# Patient Record
Sex: MALE | Race: WHITE | NOT HISPANIC OR LATINO | Employment: UNEMPLOYED | URBAN - NONMETROPOLITAN AREA
[De-identification: names, ages, dates, MRNs, and addresses within clinical notes are randomized per-mention and may not be internally consistent; named-entity substitution may affect disease eponyms.]

---

## 2017-02-23 ENCOUNTER — OFFICE VISIT (OUTPATIENT)
Dept: FAMILY MEDICINE CLINIC | Facility: CLINIC | Age: 42
End: 2017-02-23

## 2017-02-23 VITALS
DIASTOLIC BLOOD PRESSURE: 94 MMHG | BODY MASS INDEX: 38.25 KG/M2 | TEMPERATURE: 99.1 F | SYSTOLIC BLOOD PRESSURE: 141 MMHG | WEIGHT: 267.2 LBS | OXYGEN SATURATION: 99 % | HEIGHT: 70 IN | HEART RATE: 100 BPM

## 2017-02-23 DIAGNOSIS — F31.32 BIPOLAR AFFECTIVE DISORDER, CURRENTLY DEPRESSED, MODERATE (HCC): Primary | ICD-10-CM

## 2017-02-23 DIAGNOSIS — E11.65 TYPE 2 DIABETES MELLITUS WITH HYPERGLYCEMIA, WITHOUT LONG-TERM CURRENT USE OF INSULIN (HCC): ICD-10-CM

## 2017-02-23 DIAGNOSIS — J06.9 VIRAL URI: ICD-10-CM

## 2017-02-23 LAB
EXPIRATION DATE: NORMAL
FLUAV AG NPH QL: NORMAL
FLUBV AG NPH QL: NORMAL
INTERNAL CONTROL: NORMAL
Lab: NORMAL

## 2017-02-23 PROCEDURE — 87804 INFLUENZA ASSAY W/OPTIC: CPT | Performed by: NURSE PRACTITIONER

## 2017-02-23 PROCEDURE — 99214 OFFICE O/P EST MOD 30 MIN: CPT | Performed by: NURSE PRACTITIONER

## 2017-02-26 NOTE — PROGRESS NOTES
Subjective   Thomas Mireles is a 41 y.o. male.     Diabetes   He presents for his follow-up diabetic visit. He has type 2 diabetes mellitus. His disease course has been stable (patient states he hasnt been monitoring his sugar for some time as his depression has totally consumed him to the point he really didnt even care how his numbers were.  Eating his normal diet ). There are no hypoglycemic associated symptoms. There are no diabetic associated symptoms. (Denies any concerns with his diabetes.  Request sample of his medication.  ) There are no hypoglycemic complications. Symptoms are stable. There are no diabetic complications. Risk factors for coronary artery disease include obesity and stress. He is compliant with treatment most of the time. Meal planning includes avoidance of concentrated sweets. He participates in exercise intermittently.   Cough   This is a new problem. The current episode started 1 to 4 weeks ago. The problem has been waxing and waning. The cough is non-productive. Associated symptoms include chills, a fever (4 days ago fever lasted 24 hours.  ) and rhinorrhea. Pertinent negatives include no rash, sore throat, shortness of breath or wheezing. Nothing aggravates the symptoms. He has tried OTC cough suppressant and rest for the symptoms. The treatment provided moderate relief.   Depression   Visit Type: follow-up (States the last several months symptoms worsened.  Looking back feels the entire year of 2015 he was in a manic episode and now has crashed hard.  Missing a lot of work or unable to complete his day.  Request a referral back to psych)  Patient presents with the following symptoms: decreased concentration, depressed mood, excessive worry, fatigue, feelings of hopelessness, feelings of worthlessness, hypersomnia and suicidal ideas (states in the past several weeks he was low enough to consider death.  He lives with his parents and gave them possession of his gun as venkatesh was his means  of death. Feels he has overcome this feeling and knows he cant go on without medications and help . ).  Patient is not experiencing: shortness of breath, suicidal planning and thoughts of death.  Frequency of symptoms: constantly   Severity: interfering with daily activities   Sleep quality: fair  Nighttime awakenings: several       Long history of bipolar on and off medications for many years.  Most recently stop with psych care as he felt his was in control and did well without medications for some time.  His counselor and psychologist both retired within the same year both independent practice and no replacement.  Jas didn't feel he could replace them and today knows he must be on medication but dreads the side effects as sometime they are worse than his mood and behavior.  Jas has been researching herbal non chemical medications and want to discuss this with psych    The following portions of the patient's history were reviewed and updated as appropriate: allergies, current medications, past family history, past medical history, past social history, past surgical history and problem list.      Review of Systems   Constitutional: Positive for chills and fever (4 days ago fever lasted 24 hours.  ).   HENT: Positive for rhinorrhea. Negative for sore throat.    Respiratory: Positive for cough. Negative for shortness of breath and wheezing.    Skin: Negative for rash.   Psychiatric/Behavioral: Positive for agitation, behavioral problems, decreased concentration, sleep disturbance and suicidal ideas (states in the past several weeks he was low enough to consider death.  He lives with his parents and gave them possession of his gun as venkatesh was his means of death. Feels he has overcome this feeling and knows he cant go on without medications and help . ). Negative for self-injury.   All other systems reviewed and are negative.      Procedures    Objective   Physical Exam   Constitutional: He is oriented to person,  place, and time. He appears well-developed and well-nourished. No distress.   HENT:   Head: Normocephalic.   Right Ear: Hearing, tympanic membrane, external ear and ear canal normal.   Left Ear: Hearing, tympanic membrane, external ear and ear canal normal.   Nose: Nose normal.   Mouth/Throat: Uvula is midline, oropharynx is clear and moist and mucous membranes are normal. No oropharyngeal exudate. Tonsils are 0 on the right. Tonsils are 0 on the left.   Eyes: Conjunctivae are normal. Right eye exhibits no discharge. Left eye exhibits no discharge.   Neck: Neck supple. No thyromegaly present.   Cardiovascular: Normal rate, regular rhythm and normal heart sounds.    No murmur heard.  Pulmonary/Chest: Effort normal and breath sounds normal.   Neurological: He is alert and oriented to person, place, and time.   Skin: Skin is warm and dry. He is not diaphoretic.   Psychiatric: He has a normal mood and affect. His speech is normal and behavior is normal. Judgment and thought content normal. Cognition and memory are normal.   Nursing note and vitals reviewed.      Assessment/Plan   Discussed with patient impression and plan, patient verbalizes understanding.  Discussed with patient treatment and declines has any suicidal ideations today and wants to seek care outside of [facility].  States his parents are very supportive and encouraging.  Once an appointment is made states he promises to go.  Does not want in patient treatment.    Thomas was seen today for diabetes, cough, sinusitis and manic behavior.    Diagnoses and all orders for this visit:    Bipolar affective disorder, currently depressed, moderate  -     Ambulatory Referral to Psychiatry    Viral URI  -     POC Influenza A / B    Type 2 diabetes mellitus with hyperglycemia, without long-term current use of insulin  -     Comprehensive Metabolic Panel  -     Hemoglobin A1c  -     Lipid Panel  -     TSH  -     Vitamin B12

## 2017-08-02 ENCOUNTER — OFFICE VISIT (OUTPATIENT)
Dept: FAMILY MEDICINE CLINIC | Facility: CLINIC | Age: 42
End: 2017-08-02

## 2017-08-02 VITALS
BODY MASS INDEX: 35.88 KG/M2 | WEIGHT: 250.6 LBS | HEART RATE: 93 BPM | DIASTOLIC BLOOD PRESSURE: 95 MMHG | HEIGHT: 70 IN | OXYGEN SATURATION: 98 % | SYSTOLIC BLOOD PRESSURE: 138 MMHG

## 2017-08-02 DIAGNOSIS — E11.65 TYPE 2 DIABETES MELLITUS WITH HYPERGLYCEMIA, WITH LONG-TERM CURRENT USE OF INSULIN (HCC): ICD-10-CM

## 2017-08-02 DIAGNOSIS — I10 BENIGN ESSENTIAL HTN: Primary | ICD-10-CM

## 2017-08-02 DIAGNOSIS — Z79.4 TYPE 2 DIABETES MELLITUS WITH HYPERGLYCEMIA, WITH LONG-TERM CURRENT USE OF INSULIN (HCC): ICD-10-CM

## 2017-08-02 DIAGNOSIS — G44.52 NEW PERSISTENT DAILY HEADACHE: ICD-10-CM

## 2017-08-02 PROBLEM — F31.62 BIPOLAR 1 DISORDER, MIXED, MODERATE (HCC): Status: ACTIVE | Noted: 2017-08-02

## 2017-08-02 PROCEDURE — 99214 OFFICE O/P EST MOD 30 MIN: CPT | Performed by: NURSE PRACTITIONER

## 2017-08-02 RX ORDER — CYCLOBENZAPRINE HCL 5 MG
5 TABLET ORAL NIGHTLY PRN
Qty: 30 TABLET | Refills: 1 | Status: SHIPPED | OUTPATIENT
Start: 2017-08-02 | End: 2017-09-12 | Stop reason: SDUPTHER

## 2017-08-02 RX ORDER — LISINOPRIL 5 MG/1
5 TABLET ORAL DAILY
Qty: 30 TABLET | Refills: 5 | Status: SHIPPED | OUTPATIENT
Start: 2017-08-02 | End: 2017-09-12 | Stop reason: SDUPTHER

## 2017-08-02 RX ORDER — TOPIRAMATE 100 MG/1
100 TABLET, FILM COATED ORAL 2 TIMES DAILY
Refills: 0 | COMMUNITY
Start: 2017-05-19 | End: 2017-09-12 | Stop reason: SDUPTHER

## 2017-08-02 RX ORDER — NAPROXEN 500 MG/1
500 TABLET ORAL 2 TIMES DAILY WITH MEALS
Qty: 60 TABLET | Refills: 1 | Status: SHIPPED | OUTPATIENT
Start: 2017-08-02 | End: 2017-09-12 | Stop reason: SDUPTHER

## 2017-08-02 RX ORDER — PROMETHAZINE HYDROCHLORIDE 25 MG/1
25 TABLET ORAL EVERY 6 HOURS PRN
Qty: 30 TABLET | Refills: 1 | Status: SHIPPED | OUTPATIENT
Start: 2017-08-02 | End: 2017-11-01

## 2017-08-03 NOTE — PROGRESS NOTES
"Subjective   Thomas Mireles is a 41 y.o. male.   Chief Compliant: The patient presents with Headache (persistently more severe and longer lasting; predominantly in \"c[place]\" area) and Med Refill (Topamax)    HPI Comments: Returns to day with new daily headache following an injury nearly 1 year ago.   Since here last seen the psychiatry referred to and states he still battles with his bipolar and depression but overall feels he is improved with the medication and tolerating the best he can. As for his sugar he admits he hasn't been as compliant as he should be with his mood and depression he stopped medication for a while and once he returned to the pharmacy he wasn't able to afford the 400 co pay.  He didn't notify the office of any need and has been using his medication sporadically.  Really not interested in discussing his sugar today.     Headache    This is a new problem. The current episode started more than 1 year ago. The problem occurs daily. The problem has been unchanged. The pain is located in the parietal region. The pain does not radiate. The pain quality is similar to prior headaches. The quality of the pain is described as aching, dull and throbbing (at times the headache comes around the temporal and in to his left eye ). The pain is at a severity of 4/10. The pain is mild. Associated symptoms include eye pain, nausea, photophobia, scalp tenderness and vomiting. Pertinent negatives include no abdominal pain, abnormal behavior, anorexia, back pain, blurred vision, coughing, dizziness, drainage, ear pain, eye redness, eye watering, facial sweating, fever, hearing loss, insomnia, loss of balance, muscle aches, neck pain, numbness, rhinorrhea, seizures, sinus pressure, sore throat, swollen glands, tingling, tinnitus, visual change (recent eye exam without any changes), weakness or weight loss. Nothing aggravates the symptoms. He has tried acetaminophen, Excedrin and NSAIDs for the symptoms. The treatment " "provided mild relief.   Hypertension   This is a new problem. The current episode started more than 1 year ago. The problem is unchanged. Associated symptoms include anxiety and headaches. Pertinent negatives include no blurred vision, chest pain, malaise/fatigue, neck pain, orthopnea, palpitations, peripheral edema, PND, shortness of breath or sweats. Risk factors for coronary artery disease include family history, obesity and smoking/tobacco exposure. Past treatments include nothing. Compliance problems include exercise, psychosocial issues, medication side effects, medication cost and diet.       The following portions of the patient's history were reviewed and updated as appropriate: allergies, current medications, past family history, past medical history, past social history, past surgical history and problem list.      Review of Systems   Constitutional: Negative.  Negative for activity change, fever, malaise/fatigue and weight loss.   HENT: Negative for ear pain, hearing loss, rhinorrhea, sinus pressure, sore throat and tinnitus.    Eyes: Positive for photophobia and pain. Negative for blurred vision and redness.   Respiratory: Negative for cough and shortness of breath.    Cardiovascular: Negative for chest pain, palpitations, orthopnea and PND.   Gastrointestinal: Positive for nausea and vomiting. Negative for abdominal pain and anorexia.   Musculoskeletal: Negative for back pain and neck pain.   Neurological: Positive for headaches. Negative for dizziness, tingling, tremors, seizures, syncope, facial asymmetry, speech difficulty, weakness, light-headedness, numbness and loss of balance.   Psychiatric/Behavioral: The patient does not have insomnia.    All other systems reviewed and are negative.      Procedures    Vitals: Blood pressure 138/95, pulse 93, height 70\" (177.8 cm), weight 250 lb 9.6 oz (114 kg), SpO2 98 %.     Allergies: No Known Allergies       Objective   Physical Exam   Constitutional: He is " oriented to person, place, and time. He appears well-developed and well-nourished. No distress.   HENT:   Head: Normocephalic.   Right Ear: Hearing, tympanic membrane, external ear and ear canal normal.   Left Ear: Hearing, tympanic membrane, external ear and ear canal normal.   Nose: Nose normal. No mucosal edema or rhinorrhea. Right sinus exhibits no maxillary sinus tenderness and no frontal sinus tenderness. Left sinus exhibits no maxillary sinus tenderness and no frontal sinus tenderness.   Mouth/Throat: Uvula is midline, oropharynx is clear and moist and mucous membranes are normal. No oropharyngeal exudate. Tonsils are 0 on the right. Tonsils are 0 on the left. No tonsillar exudate.   Scalp tenderness with light palpitations     Eyes: Conjunctivae are normal. Pupils are equal, round, and reactive to light. Right eye exhibits no discharge. Left eye exhibits no discharge.   Neck: Neck supple. No thyromegaly present.   Cardiovascular: Normal rate, regular rhythm and normal heart sounds.    No murmur heard.  Pulmonary/Chest: Effort normal and breath sounds normal.   Lymphadenopathy:     He has no cervical adenopathy.   Neurological: He is alert and oriented to person, place, and time. He has normal reflexes. He displays normal reflexes. No cranial nerve deficit. Coordination normal.   Skin: Skin is warm and dry. He is not diaphoretic.   Psychiatric: He has a normal mood and affect. His behavior is normal. Judgment and thought content normal.   Nursing note and vitals reviewed.      Assessment/Plan   Discussed with patient impression and plan, patient verbalizes understanding  Thomas was seen today for headache and med refill.    Diagnoses and all orders for this visit:    Benign essential HTN    New persistent daily headache  -     Ambulatory Referral to Chiropractic    Type 2 diabetes mellitus with hyperglycemia, with long-term current use of insulin    Other orders  -     lisinopril (PRINIVIL,ZESTRIL) 5 MG  tablet; Take 1 tablet by mouth Daily.  -     naproxen (NAPROSYN) 500 MG tablet; Take 1 tablet by mouth 2 (Two) Times a Day With Meals.  -     promethazine (PHENERGAN) 25 MG tablet; Take 1 tablet by mouth Every 6 (Six) Hours As Needed for Nausea or Vomiting.  -     cyclobenzaprine (FLEXERIL) 5 MG tablet; Take 1 tablet by mouth At Night As Needed for Muscle Spasms.

## 2017-09-01 ENCOUNTER — TELEPHONE (OUTPATIENT)
Dept: FAMILY MEDICINE CLINIC | Facility: CLINIC | Age: 42
End: 2017-09-01

## 2017-09-01 NOTE — TELEPHONE ENCOUNTER
Uzma Flores Chiropractor called regarding patient,reports his headaches have improved but are not going away completely,he told her you were going to refer him to Neuro/Psych? She is just questioning if this is the plan going forward.

## 2017-09-05 NOTE — TELEPHONE ENCOUNTER
We hadnt discussed this and he does see psych  So new plans of a new referral       Uzma notified.

## 2017-09-07 DIAGNOSIS — F31.60 BIPOLAR AFFECTIVE DISORDER, CURRENT EPISODE MIXED, CURRENT EPISODE SEVERITY UNSPECIFIED (HCC): Primary | ICD-10-CM

## 2017-09-12 ENCOUNTER — OFFICE VISIT (OUTPATIENT)
Dept: FAMILY MEDICINE CLINIC | Facility: CLINIC | Age: 42
End: 2017-09-12

## 2017-09-12 VITALS
HEIGHT: 70 IN | BODY MASS INDEX: 35.96 KG/M2 | DIASTOLIC BLOOD PRESSURE: 88 MMHG | SYSTOLIC BLOOD PRESSURE: 141 MMHG | WEIGHT: 251.2 LBS | OXYGEN SATURATION: 98 % | HEART RATE: 97 BPM

## 2017-09-12 DIAGNOSIS — Z79.4 TYPE 2 DIABETES MELLITUS WITH HYPERGLYCEMIA, WITH LONG-TERM CURRENT USE OF INSULIN (HCC): ICD-10-CM

## 2017-09-12 DIAGNOSIS — E78.2 MIXED HYPERLIPIDEMIA: ICD-10-CM

## 2017-09-12 DIAGNOSIS — G44.52 NEW DAILY PERSISTENT HEADACHE: Primary | ICD-10-CM

## 2017-09-12 DIAGNOSIS — F31.62 BIPOLAR 1 DISORDER, MIXED, MODERATE (HCC): ICD-10-CM

## 2017-09-12 DIAGNOSIS — I10 ESSENTIAL HYPERTENSION: ICD-10-CM

## 2017-09-12 DIAGNOSIS — E11.65 TYPE 2 DIABETES MELLITUS WITH HYPERGLYCEMIA, WITH LONG-TERM CURRENT USE OF INSULIN (HCC): ICD-10-CM

## 2017-09-12 LAB
ALBUMIN SERPL-MCNC: 4.6 G/DL (ref 3.5–5)
ALBUMIN/GLOB SERPL: 1.6 G/DL (ref 1.5–2.5)
ALP SERPL-CCNC: 146 U/L (ref 40–129)
ALT SERPL W P-5'-P-CCNC: 31 U/L (ref 10–44)
ANION GAP SERPL CALCULATED.3IONS-SCNC: 7.9 MMOL/L (ref 3.6–11.2)
AST SERPL-CCNC: 25 U/L (ref 10–34)
BILIRUB SERPL-MCNC: 1.1 MG/DL (ref 0.2–1.8)
BUN BLD-MCNC: 19 MG/DL (ref 7–21)
BUN/CREAT SERPL: 24.7 (ref 7–25)
CALCIUM SPEC-SCNC: 9.8 MG/DL (ref 7.7–10)
CHLORIDE SERPL-SCNC: 109 MMOL/L (ref 99–112)
CHOLEST SERPL-MCNC: 252 MG/DL (ref 0–200)
CO2 SERPL-SCNC: 25.1 MMOL/L (ref 24.3–31.9)
CREAT BLD-MCNC: 0.77 MG/DL (ref 0.43–1.29)
GFR SERPL CREATININE-BSD FRML MDRD: 111 ML/MIN/1.73
GLOBULIN UR ELPH-MCNC: 2.9 GM/DL
GLUCOSE BLD-MCNC: 303 MG/DL (ref 70–110)
HBA1C MFR BLD: 11.6 % (ref 4.5–5.7)
HDLC SERPL-MCNC: 39 MG/DL (ref 60–100)
LDLC SERPL CALC-MCNC: 135 MG/DL (ref 0–100)
LDLC/HDLC SERPL: 3.47 {RATIO}
OSMOLALITY SERPL CALC.SUM OF ELEC: 296.7 MOSM/KG (ref 273–305)
POTASSIUM BLD-SCNC: 4.3 MMOL/L (ref 3.5–5.3)
PROT SERPL-MCNC: 7.5 G/DL (ref 6–8)
SODIUM BLD-SCNC: 142 MMOL/L (ref 135–153)
TRIGL SERPL-MCNC: 389 MG/DL (ref 0–150)
TSH SERPL DL<=0.05 MIU/L-ACNC: 0.79 MIU/ML (ref 0.55–4.78)
VLDLC SERPL-MCNC: 77.8 MG/DL

## 2017-09-12 PROCEDURE — 84443 ASSAY THYROID STIM HORMONE: CPT | Performed by: NURSE PRACTITIONER

## 2017-09-12 PROCEDURE — 80061 LIPID PANEL: CPT | Performed by: NURSE PRACTITIONER

## 2017-09-12 PROCEDURE — 80053 COMPREHEN METABOLIC PANEL: CPT | Performed by: NURSE PRACTITIONER

## 2017-09-12 PROCEDURE — 83036 HEMOGLOBIN GLYCOSYLATED A1C: CPT | Performed by: NURSE PRACTITIONER

## 2017-09-12 PROCEDURE — 99214 OFFICE O/P EST MOD 30 MIN: CPT | Performed by: NURSE PRACTITIONER

## 2017-09-12 PROCEDURE — 36415 COLL VENOUS BLD VENIPUNCTURE: CPT | Performed by: NURSE PRACTITIONER

## 2017-09-12 RX ORDER — LISINOPRIL 5 MG/1
5 TABLET ORAL DAILY
Qty: 90 TABLET | Refills: 3 | Status: SHIPPED | OUTPATIENT
Start: 2017-09-12 | End: 2018-06-26 | Stop reason: SDUPTHER

## 2017-09-12 RX ORDER — NAPROXEN 500 MG/1
500 TABLET ORAL 2 TIMES DAILY WITH MEALS
Qty: 180 TABLET | Refills: 3 | Status: SHIPPED | OUTPATIENT
Start: 2017-09-12 | End: 2018-06-26 | Stop reason: SDUPTHER

## 2017-09-12 RX ORDER — CYCLOBENZAPRINE HCL 5 MG
5 TABLET ORAL NIGHTLY PRN
Qty: 90 TABLET | Refills: 3 | Status: SHIPPED | OUTPATIENT
Start: 2017-09-12 | End: 2018-06-26 | Stop reason: SDUPTHER

## 2017-09-12 RX ORDER — DEXTROMETHORPHAN HYDROBROMIDE AND QUINIDINE SULFATE 20; 10 MG/1; MG/1
1 CAPSULE, GELATIN COATED ORAL 2 TIMES DAILY
Refills: 0 | COMMUNITY
Start: 2017-08-16 | End: 2018-03-23 | Stop reason: HOSPADM

## 2017-09-12 RX ORDER — TOPIRAMATE 100 MG/1
100 TABLET, FILM COATED ORAL 2 TIMES DAILY
Qty: 180 TABLET | Refills: 3 | Status: SHIPPED | OUTPATIENT
Start: 2017-09-12 | End: 2018-06-26 | Stop reason: SDUPTHER

## 2017-09-12 NOTE — PROGRESS NOTES
Subjective   Thomas Mireles is a 41 y.o. male.   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  Since now has a daily headache can pinpoint the direct point of pain daily.  Seen at [facility] [employer] and didnt feel he got the correct attention.  ) problem. The current episode started more than 1 year ago. The problem occurs daily. The problem has been unchanged. The pain is located in the parietal region. The pain does not radiate. The pain quality is similar to prior headaches. The quality of the pain is described as aching, dull and throbbing. The pain is at a severity of 4/10. The pain is moderate. Associated symptoms include scalp tenderness. Pertinent negatives include no abdominal pain, abnormal behavior, anorexia, back pain, blurred vision, coughing, dizziness, drainage, ear pain, eye pain, eye redness, eye watering, facial sweating, fever, hearing loss, insomnia, loss of balance, muscle aches, nausea, neck pain, numbness, photophobia, rhinorrhea, seizures, sinus pressure, sore throat, swollen glands, tingling, tinnitus, visual change (recent eye exam without any changes), vomiting, weakness or weight loss. Nothing aggravates the symptoms. He has tried acetaminophen, Excedrin and NSAIDs (Seen chiropractor no imprpvments) for the symptoms. The treatment provided mild relief. His past medical history is significant for hypertension.   Hypertension   This is a new problem. The current episode started more than 1 year ago. The problem has been waxing and waning (Denies any new concerns or symptoms.  Admits he isnt taking his medication routinely ) since onset. Associated symptoms include headaches. Pertinent negatives include no blurred vision, chest pain, malaise/fatigue, neck pain, orthopnea, palpitations, peripheral edema, PND, shortness  of breath or sweats. Risk factors for coronary artery disease include family history, obesity and smoking/tobacco exposure. Past treatments include nothing. Compliance problems include exercise, psychosocial issues, medication side effects, medication cost and diet.    Diabetes   He presents for his follow-up diabetic visit. He has type 2 diabetes mellitus. His disease course has been stable (States he feels ok admits he isnt as compliant as he would actually like to be.  States the daily headache distracts him and leads a downward decline of his health habits, ). Hypoglycemia symptoms include headaches. Pertinent negatives for hypoglycemia include no confusion, dizziness, seizures, sleepiness, speech difficulty, sweats or tremors. Mood changes: with known bipolar. There are no diabetic associated symptoms. Pertinent negatives for diabetes include no blurred vision, no chest pain, no visual change (recent eye exam without any changes), no weakness and no weight loss. There are no hypoglycemic complications. Symptoms are stable. There are no diabetic complications. Risk factors for coronary artery disease include dyslipidemia, family history, obesity, male sex, hypertension, sedentary lifestyle and stress. Current diabetic treatment includes insulin injections and oral agent (dual therapy). He is compliant with treatment some of the time (more consistent latelyt ). His weight is stable. He is following a generally healthy diet. Meal planning includes avoidance of concentrated sweets. He never participates in exercise. Home blood sugar record trend: honest to report he is not monitoring routinely. An ACE inhibitor/angiotensin II receptor blocker is being taken. He does not see a podiatrist.Eye exam is current.   Depression   Visit Type: follow-up (feels his mood is ok for the most part really aggravated with his injury and daily aggravating headache which further affects his mood)  Patient presents with the following  "symptoms: decreased concentration, depressed mood, malaise and muscle tension.  Patient is not experiencing: confusion, insomnia, palpitations, shortness of breath, suicidal ideas, suicidal planning, thoughts of death and weight loss.  Frequency of symptoms: constantly   Severity: interfering with daily activities   Sleep quality: fair  Side effects:  Headaches     The following portions of the patient's history were reviewed and updated as appropriate: allergies, current medications, past family history, past medical history, past social history, past surgical history and problem list.      Review of Systems   Constitutional: Negative.  Negative for activity change, fever, malaise/fatigue and weight loss.   HENT: Negative for ear pain, hearing loss, rhinorrhea, sinus pressure, sore throat and tinnitus.    Eyes: Negative for blurred vision, photophobia, pain and redness.   Respiratory: Negative.  Negative for cough and shortness of breath.    Cardiovascular: Negative.  Negative for chest pain, palpitations, orthopnea and PND.   Gastrointestinal: Negative for abdominal pain, anorexia, nausea and vomiting.   Musculoskeletal: Negative for back pain and neck pain.   Neurological: Positive for headaches. Negative for dizziness, tingling, tremors, seizures, syncope, facial asymmetry, speech difficulty, weakness, light-headedness, numbness and loss of balance.   Psychiatric/Behavioral: Positive for agitation, behavioral problems and decreased concentration. Negative for confusion and suicidal ideas. The patient does not have insomnia.    All other systems reviewed and are negative.      Procedures    Vitals: Blood pressure 141/88, pulse 97, height 70\" (177.8 cm), weight 251 lb 3.2 oz (114 kg), SpO2 98 %.     Allergies: No Known Allergies       Objective   Physical Exam   Constitutional: He is oriented to person, place, and time. He appears well-developed and well-nourished. No distress.   HENT:   Head: Normocephalic. "   Right Ear: Hearing, tympanic membrane, external ear and ear canal normal.   Left Ear: Hearing, tympanic membrane, external ear and ear canal normal.   Nose: Nose normal. No mucosal edema or rhinorrhea. Right sinus exhibits no maxillary sinus tenderness and no frontal sinus tenderness. Left sinus exhibits no maxillary sinus tenderness and no frontal sinus tenderness.   Mouth/Throat: Uvula is midline, oropharynx is clear and moist and mucous membranes are normal. No oropharyngeal exudate. Tonsils are 0 on the right. Tonsils are 0 on the left. No tonsillar exudate.   Scalp tenderness with light palpitations     Eyes: Conjunctivae are normal. Pupils are equal, round, and reactive to light. Right eye exhibits no discharge. Left eye exhibits no discharge.   Neck: Neck supple. No thyromegaly present.   Cardiovascular: Normal rate, regular rhythm and normal heart sounds.    No murmur heard.  Pulmonary/Chest: Effort normal and breath sounds normal.   Lymphadenopathy:     He has no cervical adenopathy.   Neurological: He is alert and oriented to person, place, and time. He has normal reflexes. He displays normal reflexes. No cranial nerve deficit. Coordination normal.   Skin: Skin is warm and dry. He is not diaphoretic.   Psychiatric: He has a normal mood and affect. His behavior is normal. Judgment and thought content normal.   Nursing note and vitals reviewed.      Assessment/Plan   Discussed with patient impression and plan, patient verbalizes understanding.  Fasting labs today.  Encouraged compliancy.  Will follow after CT   Thomas was seen today for follow-up and headache.    Diagnoses and all orders for this visit:    New daily persistent headache  -     CT cervical spine wo contrast; Future    Essential hypertension  -     Comprehensive Metabolic Panel  -     Lipid Panel  -     TSH    Mixed hyperlipidemia  -     Comprehensive Metabolic Panel  -     Lipid Panel    Type 2 diabetes mellitus with hyperglycemia, with  long-term current use of insulin  -     Hemoglobin A1c    Bipolar 1 disorder, mixed, moderate    Other orders  -     cyclobenzaprine (FLEXERIL) 5 MG tablet; Take 1 tablet by mouth At Night As Needed for Muscle Spasms.  -     insulin degludec (TRESIBA FLEXTOUCH) 100 UNIT/ML solution pen-injector injection; Inject 40 Units under the skin Daily.  -     lisinopril (PRINIVIL,ZESTRIL) 5 MG tablet; Take 1 tablet by mouth Daily.  -     naproxen (NAPROSYN) 500 MG tablet; Take 1 tablet by mouth 2 (Two) Times a Day With Meals.  -     topiramate (TOPAMAX) 100 MG tablet; Take 1 tablet by mouth 2 (Two) Times a Day.  -     Insulin Pen Needle (NOVOFINE PLUS) 32G X 4 MM misc; Use once daily

## 2017-09-15 ENCOUNTER — HOSPITAL ENCOUNTER (OUTPATIENT)
Dept: CT IMAGING | Facility: HOSPITAL | Age: 42
Discharge: HOME | End: 2017-09-15
Admitting: NURSE PRACTITIONER

## 2017-09-15 DIAGNOSIS — G44.52 NEW DAILY PERSISTENT HEADACHE: ICD-10-CM

## 2017-09-15 PROCEDURE — 72125 CT NECK SPINE W/O DYE: CPT | Performed by: RADIOLOGY

## 2017-09-15 PROCEDURE — 72125 CT NECK SPINE W/O DYE: CPT

## 2017-09-21 ENCOUNTER — CLINICAL SUPPORT (OUTPATIENT)
Dept: FAMILY MEDICINE CLINIC | Facility: CLINIC | Age: 42
End: 2017-09-21

## 2017-09-21 DIAGNOSIS — Z23 IMMUNIZATION DUE: Primary | ICD-10-CM

## 2017-09-21 PROCEDURE — 90686 IIV4 VACC NO PRSV 0.5 ML IM: CPT | Performed by: NURSE PRACTITIONER

## 2017-09-21 PROCEDURE — 90471 IMMUNIZATION ADMIN: CPT | Performed by: NURSE PRACTITIONER

## 2017-10-04 ENCOUNTER — OFFICE VISIT (OUTPATIENT)
Dept: FAMILY MEDICINE CLINIC | Facility: CLINIC | Age: 42
End: 2017-10-04

## 2017-10-04 VITALS
DIASTOLIC BLOOD PRESSURE: 85 MMHG | HEIGHT: 70 IN | HEART RATE: 110 BPM | SYSTOLIC BLOOD PRESSURE: 143 MMHG | WEIGHT: 248.4 LBS | OXYGEN SATURATION: 98 % | BODY MASS INDEX: 35.56 KG/M2

## 2017-10-04 DIAGNOSIS — F31.62 BIPOLAR 1 DISORDER, MIXED, MODERATE (HCC): ICD-10-CM

## 2017-10-04 DIAGNOSIS — I10 ESSENTIAL HYPERTENSION: ICD-10-CM

## 2017-10-04 DIAGNOSIS — Z79.4 TYPE 2 DIABETES MELLITUS WITH HYPERGLYCEMIA, WITH LONG-TERM CURRENT USE OF INSULIN (HCC): ICD-10-CM

## 2017-10-04 DIAGNOSIS — L71.9 ROSACEA: ICD-10-CM

## 2017-10-04 DIAGNOSIS — R51.9 CHRONIC DAILY HEADACHE: Primary | ICD-10-CM

## 2017-10-04 DIAGNOSIS — E11.65 TYPE 2 DIABETES MELLITUS WITH HYPERGLYCEMIA, WITH LONG-TERM CURRENT USE OF INSULIN (HCC): ICD-10-CM

## 2017-10-04 PROCEDURE — 99214 OFFICE O/P EST MOD 30 MIN: CPT | Performed by: NURSE PRACTITIONER

## 2017-10-04 RX ORDER — BUTALBITAL, ASPIRIN, AND CAFFEINE 50; 325; 40 MG/1; MG/1; MG/1
1 CAPSULE ORAL 2 TIMES DAILY PRN
Qty: 40 CAPSULE | Refills: 0 | Status: SHIPPED | OUTPATIENT
Start: 2017-10-04 | End: 2022-02-22

## 2017-10-04 RX ORDER — METRONIDAZOLE 7.5 MG/G
GEL TOPICAL 2 TIMES DAILY
Qty: 45 G | Refills: 5 | Status: ON HOLD | OUTPATIENT
Start: 2017-10-04 | End: 2017-11-01

## 2017-10-04 NOTE — PROGRESS NOTES
Subjective   Thomas Mireles is a 41 y.o. male.   Chief Compliant: The patient presents with Headache    HPI Comments: Returns today with request for new referral as the Physican he was scheduled with is no longer at the practice.  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  Since now has a daily headache can pinpoint the direct point of pain daily.  Seen at [facility] and didnt feel he got the correct attention.  ) problem. The current episode started more than 1 year ago. The problem occurs daily. The problem has been unchanged. The pain is located in the parietal region. The pain does not radiate. The pain quality is similar to prior headaches. The quality of the pain is described as aching, dull and throbbing. The pain is at a severity of 4/10. The pain is moderate. Associated symptoms include scalp tenderness. Pertinent negatives include no abdominal pain, abnormal behavior, anorexia, back pain, blurred vision, coughing, dizziness, drainage, ear pain, eye pain, eye redness, eye watering, facial sweating, fever, hearing loss, insomnia, loss of balance, muscle aches, nausea, neck pain, numbness, photophobia, rhinorrhea, seizures, sinus pressure, sore throat, swollen glands, tingling, tinnitus, visual change (recent eye exam without any changes), vomiting, weakness or weight loss. Associated symptoms comments: Pain is described as sharp quick buzz or sting in the back of his head.  Patient can pinpoint the direct area.  Pain is aggravating every aspect of his life and further worsening his anxiety and depression   . Nothing aggravates the symptoms. He has tried acetaminophen, Excedrin and NSAIDs (Seen chiropractor no imprpvments) for the symptoms. The treatment provided mild relief. His past medical history is significant for hypertension.   Hypertension   This is a new problem. The current  episode started more than 1 year ago. The problem has been waxing and waning (Denies any new concerns or symptoms.  States since he was here taking his medicaiton regular.  Pulse is high today as he is aggravated by a work related call just before the visit) since onset. Associated symptoms include headaches. Pertinent negatives include no blurred vision, chest pain, malaise/fatigue, neck pain, orthopnea, palpitations, peripheral edema, PND, shortness of breath or sweats. Risk factors for coronary artery disease include family history, obesity and smoking/tobacco exposure. Past treatments include ACE inhibitors. The current treatment provides moderate improvement. Compliance problems include exercise, psychosocial issues, medication side effects, medication cost and diet.    Diabetes   He presents for his follow-up diabetic visit. He has type 2 diabetes mellitus. His disease course has been stable (States he feels ok admits he isnt as compliant as he would actually like to be.  States the daily headache distracts him and leads a downward decline of his health habits, ). Hypoglycemia symptoms include headaches and nervousness/anxiousness. Pertinent negatives for hypoglycemia include no confusion, dizziness, hunger, pallor, seizures, sleepiness, speech difficulty, sweats or tremors. Mood changes: with known bipolar. There are no diabetic associated symptoms. Pertinent negatives for diabetes include no blurred vision, no chest pain, no fatigue, no foot paresthesias, no foot ulcerations, no polydipsia, no polyphagia, no polyuria, no visual change (recent eye exam without any changes), no weakness and no weight loss. There are no hypoglycemic complications. Symptoms are stable. There are no diabetic complications. Risk factors for coronary artery disease include dyslipidemia, family history, obesity, male sex, hypertension, sedentary lifestyle and stress. Current diabetic treatment includes insulin injections and oral  agent (dual therapy). He is compliant with treatment some of the time (states he is taking his medications everyday ). His weight is stable. He is following a generally healthy diet. Meal planning includes avoidance of concentrated sweets. He never participates in exercise. Home blood sugar record trend: honest to report he is not monitoring routinely. An ACE inhibitor/angiotensin II receptor blocker is being taken. He does not see a podiatrist.Eye exam is current.   Depression   Visit Type: follow-up (feels his mood is ok for the most part really aggravated with his injury and daily aggravating headache which further affects his mood)  Patient presents with the following symptoms: decreased concentration, depressed mood, dizziness, excessive worry, malaise, muscle tension and nervousness/anxiety.  Patient is not experiencing: confusion, insomnia, palpitations, shortness of breath, suicidal ideas, suicidal planning, thoughts of death and weight loss.  Frequency of symptoms: constantly   Severity: interfering with daily activities   Sleep quality: fair  Side effects:  Headaches  Rash   This is a recurrent problem. The current episode started more than 1 year ago. The problem has been waxing and waning (worse with shaving ) since onset. The affected locations include the face. The rash is characterized by dryness, redness and burning. He was exposed to nothing. Pertinent negatives include no anorexia, cough, eye pain, fatigue, fever, rhinorrhea, shortness of breath, sore throat or vomiting. Past treatments include nothing (moistruizer). The treatment provided mild relief.      The following portions of the patient's history were reviewed and updated as appropriate: allergies, current medications, past family history, past medical history, past social history, past surgical history and problem list.      Review of Systems   Constitutional: Negative.  Negative for activity change, fatigue, fever, malaise/fatigue and  "weight loss.   HENT: Negative for ear pain, hearing loss, rhinorrhea, sinus pressure, sore throat and tinnitus.    Eyes: Negative for blurred vision, photophobia, pain and redness.   Respiratory: Negative.  Negative for cough and shortness of breath.    Cardiovascular: Negative.  Negative for chest pain, palpitations, orthopnea and PND.   Gastrointestinal: Negative for abdominal pain, anorexia, nausea and vomiting.   Endocrine: Negative for polydipsia, polyphagia and polyuria.   Genitourinary: Negative.    Musculoskeletal: Negative for back pain and neck pain.   Skin: Positive for rash. Negative for pallor.   Neurological: Positive for headaches. Negative for dizziness, tingling, tremors, seizures, syncope, facial asymmetry, speech difficulty, weakness, light-headedness, numbness and loss of balance.   Psychiatric/Behavioral: Positive for agitation, behavioral problems and decreased concentration. Negative for confusion and suicidal ideas. The patient is nervous/anxious. The patient does not have insomnia.    All other systems reviewed and are negative.      Procedures    Vitals: Blood pressure 143/85, pulse 110, height 70\" (177.8 cm), weight 248 lb 6.4 oz (113 kg), SpO2 98 %.     Allergies: No Known Allergies       Objective   Physical Exam   Constitutional: He is oriented to person, place, and time. He appears well-developed and well-nourished. No distress.   HENT:   Head: Normocephalic.   Right Ear: Hearing, tympanic membrane, external ear and ear canal normal.   Left Ear: Hearing, tympanic membrane, external ear and ear canal normal.   Nose: Nose normal. No mucosal edema or rhinorrhea. Right sinus exhibits no maxillary sinus tenderness and no frontal sinus tenderness. Left sinus exhibits no maxillary sinus tenderness and no frontal sinus tenderness.   Mouth/Throat: Uvula is midline, oropharynx is clear and moist and mucous membranes are normal. No oropharyngeal exudate. Tonsils are 0 on the right. Tonsils are 0 " on the left. No tonsillar exudate.   Scalp tenderness with light palpitations     Eyes: Conjunctivae are normal. Pupils are equal, round, and reactive to light. Right eye exhibits no discharge. Left eye exhibits no discharge.   Neck: Neck supple. No thyromegaly present.   Cardiovascular: Normal rate, regular rhythm and normal heart sounds.    No murmur heard.  Pulmonary/Chest: Effort normal and breath sounds normal.   Lymphadenopathy:     He has no cervical adenopathy.   Neurological: He is alert and oriented to person, place, and time. He has normal reflexes. He displays normal reflexes. No cranial nerve deficit. Coordination normal.   Skin: Skin is warm and dry. Rash noted. He is not diaphoretic. There is erythema.   Around nose, hair line and mouth     Psychiatric: He has a normal mood and affect. His behavior is normal. Judgment and thought content normal.   Nursing note and vitals reviewed.      Assessment/Plan   Discussed with patient impression and plan, patient verbalizes understanding.    Encouraged continued compliancy.  Ct reviewed with patient      Thomas was seen today for headache.    Diagnoses and all orders for this visit:    Chronic daily headache  -     Ambulatory Referral to Neurology    Rosacea    Bipolar 1 disorder, mixed, moderate    Essential hypertension    Type 2 diabetes mellitus with hyperglycemia, with long-term current use of insulin    Other orders  -     metroNIDAZOLE (METROGEL) 0.75 % gel; Apply  topically 2 (Two) Times a Day.  -     butalbital-aspirin-caffeine (FIORINAL) -40 MG per capsule; Take 1 capsule by mouth 2 (Two) Times a Day As Needed for Headache.      [facility] # [id number] Reviewed and is consistent.  UDS  reviewed and is consistent.  Patient comfort assessment guide reviewed and updated today.    As part of the patient's treatment plan they are being prescribed a controlled substance/ substances with potential for abuse.  This patient has been made aware of the  appropriate use of such medications, including potential risk of somnolence, limited ability to drive and/or work safely, and potential for overdose.  It has also been made clear these medications are for use by the patient only, without concomitant use of alcohol or other substances unless prescribed/advised by medical provider.    Patient has completed prescribing agreement detailing terms of continued prescribing of controlled substances including monitoring GLENN reports, urine drug screens, and pill counts.  The patient is aware GLENN reports are reviewed on a regular basis and scanned into the chart.    History and physical exam exhibit continued safe and appropriate use of controlled substances.

## 2017-10-19 ENCOUNTER — OFFICE VISIT (OUTPATIENT)
Dept: NEUROLOGY | Facility: CLINIC | Age: 42
End: 2017-10-19

## 2017-10-19 VITALS
HEIGHT: 70 IN | BODY MASS INDEX: 35.07 KG/M2 | DIASTOLIC BLOOD PRESSURE: 86 MMHG | SYSTOLIC BLOOD PRESSURE: 127 MMHG | WEIGHT: 245 LBS

## 2017-10-19 DIAGNOSIS — G44.40 ANALGESIC OVERUSE HEADACHE: ICD-10-CM

## 2017-10-19 DIAGNOSIS — T39.95XA ANALGESIC OVERUSE HEADACHE: ICD-10-CM

## 2017-10-19 DIAGNOSIS — R51.9 CHRONIC DAILY HEADACHE: Primary | ICD-10-CM

## 2017-10-19 PROCEDURE — 99244 OFF/OP CNSLTJ NEW/EST MOD 40: CPT | Performed by: PSYCHIATRY & NEUROLOGY

## 2017-10-19 RX ORDER — HALOPERIDOL 2 MG/1
2 TABLET ORAL 3 TIMES DAILY
Qty: 90 TABLET | Refills: 2 | Status: SHIPPED | OUTPATIENT
Start: 2017-10-19 | End: 2017-11-03 | Stop reason: HOSPADM

## 2017-10-19 NOTE — PROGRESS NOTES
Subjective   Thomas Mireles is a 41 y.o. male.     Headache    Associated symptoms include dizziness, nausea and a visual change.   Neurologic Problem   The patient's primary symptoms include an altered mental status, clumsiness, memory loss, near-syncope, slurred speech and a visual change. This is a chronic problem. The current episode started more than 1 year ago. The neurological problem developed suddenly. The problem has been waxing and waning since onset. There was facial focality noted. Associated symptoms include dizziness, fatigue, headaches, light-headedness, nausea and vertigo. Past treatments include aspirin, bed rest, medication, neck support and sleep. The treatment provided no relief.        The following portions of the patient's history were reviewed today and updated as appropriate:  allergies, current medications, past family history, past medical history, past social history, past surgical history and problem list.      Chief complaint is headache and the patient is seen today in consultation at the request of the referring health care provider  The time I spent with the patient today was used discussing the differential diagnosis, treatment and management options and prognosis. I addressed all of the patient's questions.  The patient's referring doctor's notes were reviewed by me today and in summary state that he has chronic intractable headaches.  A CT scan of the head was reviewed by me also in the report of both which are both indicate no abnormality for age, same holds true for CT of the cervical spine which was reviewed by me today  Patient's problem started about a year ago when and water logged tile fell on his head and since then he has pain now over the high parietal area on the right side that is constant he has worsening of the headaches about 4 times a day and he has had no significant response to Topamax at this point which he takes for bipolar disorder. He also used naproxen  "without response    Review of Systems   Constitutional: Positive for fatigue.   Cardiovascular: Positive for near-syncope.   Gastrointestinal: Positive for nausea.   Neurological: Positive for dizziness, vertigo, light-headedness and headaches.   Psychiatric/Behavioral: Positive for memory loss.         Objective      height is 70\" (177.8 cm) and weight is 245 lb (111 kg). His blood pressure is 127/86.     The patient's general appearance was normal today  Carotid pulses were palpable bilaterally  The ophthalmological exam showed the refractory media clear, no blurring of disc margins, there were no hemorrhages noted, blood vessels appeared normal.      Neurologic Exam     Mental Status   Oriented to person.   Oriented to place. Oriented to country, city, area and street.   Oriented to time. Oriented to year, month, date, day and season.   Registration: recalls 3 of 3 objects. Recall at 5 minutes: recalls 3 of 3 objects. Follows 3 step commands.   Attention: normal.   Speech: speech is normal   Level of consciousness: alert  Knowledge: consistent with education.   Able to name object. Able to read. Able to repeat. Able to write. Normal comprehension.     Cranial Nerves     CN II   Visual fields full to confrontation.     CN III, IV, VI   Right pupil: Shape: regular. Consensual response: intact. Accommodation: intact.   Left pupil: Shape: regular. Consensual response: intact.   CN III: no CN III palsy  CN VI: no CN VI palsy  Nystagmus: none   Diplopia: none  Ophthalmoparesis: none  Upgaze: normal  Downgaze: normal  Conjugate gaze: present  Vestibulo-ocular reflex: present    CN V   Facial sensation intact.     CN VII   Facial expression full, symmetric.     CN VIII   CN VIII normal.     CN IX, X   CN IX normal.     CN XI   CN XI normal.     CN XII   CN XII normal.     Motor Exam   Muscle bulk: normal  Overall muscle tone: normal  Right arm pronator drift: absent  Left arm pronator drift: absent    Strength   Strength " "5/5 except as noted.     Sensory Exam   Light touch normal.     Gait, Coordination, and Reflexes     Gait  Gait: normal    Coordination   Romberg: negative  Finger to nose coordination: normal  Heel to shin coordination: normal  Tandem walking coordination: normal    Tremor   Resting tremor: absent  Intention tremor: absent  Action tremor: absent    Reflexes   Reflexes 2+ except as noted.       Assessment/Plan     Thomas was seen today for headache.    Diagnoses and all orders for this visit:    Chronic daily headache    Analgesic overuse headache    Other orders  -     haloperidol (HALDOL) 2 MG tablet; Take 1 tablet by mouth 3 (Three) Times a Day. For HA PRN once a week        Discussion/Summary:        The patient will try Haldol when necessary for his headaches as since he cannot have chlorpromazine because of it medication interaction and I will increase the dose of Topamax every 2 weeks by 100 mg until his headaches are improved.            Disclaimer: This letter was created using dragon voice recognition software.  This voice recognition software may create errors that may go undetected and can impact the meaning of a sentence or paragraph.  The most frequent error is that the software misidentifies \"he\" and \"she\". However, contextual reading is often able to identify this as a voice recognotion error.  Another frequent error is that the software misidentifies \"the patient\" as \"\"          "

## 2017-11-01 ENCOUNTER — HOSPITAL ENCOUNTER (EMERGENCY)
Facility: HOSPITAL | Age: 42
Discharge: STILL ADMITTED OR ADMINISTRATIVE | End: 2017-11-01
Attending: EMERGENCY MEDICINE

## 2017-11-01 ENCOUNTER — OFFICE VISIT (OUTPATIENT)
Dept: FAMILY MEDICINE CLINIC | Facility: CLINIC | Age: 42
End: 2017-11-01

## 2017-11-01 ENCOUNTER — HOSPITAL ENCOUNTER (INPATIENT)
Facility: HOSPITAL | Age: 42
LOS: 2 days | Discharge: HOME | End: 2017-11-03
Attending: PSYCHIATRY & NEUROLOGY | Admitting: PSYCHIATRY & NEUROLOGY

## 2017-11-01 VITALS
SYSTOLIC BLOOD PRESSURE: 140 MMHG | DIASTOLIC BLOOD PRESSURE: 96 MMHG | WEIGHT: 248 LBS | OXYGEN SATURATION: 98 % | HEIGHT: 70 IN | BODY MASS INDEX: 35.5 KG/M2 | HEART RATE: 109 BPM

## 2017-11-01 VITALS
WEIGHT: 248 LBS | TEMPERATURE: 98.7 F | BODY MASS INDEX: 35.5 KG/M2 | RESPIRATION RATE: 20 BRPM | SYSTOLIC BLOOD PRESSURE: 142 MMHG | HEIGHT: 70 IN | OXYGEN SATURATION: 98 % | HEART RATE: 106 BPM | DIASTOLIC BLOOD PRESSURE: 94 MMHG

## 2017-11-01 DIAGNOSIS — R45.851 FEELING SUICIDAL: ICD-10-CM

## 2017-11-01 DIAGNOSIS — F31.9 BIPOLAR 1 DISORDER (HCC): Primary | ICD-10-CM

## 2017-11-01 DIAGNOSIS — F31.62 BIPOLAR 1 DISORDER, MIXED, MODERATE (HCC): Primary | ICD-10-CM

## 2017-11-01 LAB
6-ACETYL MORPHINE: NEGATIVE
ALBUMIN SERPL-MCNC: 4.5 G/DL (ref 3.5–5)
ALBUMIN/GLOB SERPL: 1.5 G/DL (ref 1.5–2.5)
ALP SERPL-CCNC: 134 U/L (ref 40–129)
ALT SERPL W P-5'-P-CCNC: 26 U/L (ref 10–44)
AMPHET+METHAMPHET UR QL: NEGATIVE
ANION GAP SERPL CALCULATED.3IONS-SCNC: 11.7 MMOL/L (ref 3.6–11.2)
AST SERPL-CCNC: 19 U/L (ref 10–34)
BARBITURATES UR QL SCN: POSITIVE
BASOPHILS # BLD AUTO: 0.02 10*3/MM3 (ref 0–0.3)
BASOPHILS NFR BLD AUTO: 0.4 % (ref 0–2)
BENZODIAZ UR QL SCN: NEGATIVE
BILIRUB SERPL-MCNC: 0.9 MG/DL (ref 0.2–1.8)
BILIRUB UR QL STRIP: NEGATIVE
BUN BLD-MCNC: 10 MG/DL (ref 7–21)
BUN/CREAT SERPL: 12.5 (ref 7–25)
BUPRENORPHINE SERPL-MCNC: NEGATIVE NG/ML
CALCIUM SPEC-SCNC: 9.6 MG/DL (ref 7.7–10)
CANNABINOIDS SERPL QL: NEGATIVE
CHLORIDE SERPL-SCNC: 108 MMOL/L (ref 99–112)
CLARITY UR: CLEAR
CO2 SERPL-SCNC: 19.3 MMOL/L (ref 24.3–31.9)
COCAINE UR QL: NEGATIVE
COLOR UR: YELLOW
CREAT BLD-MCNC: 0.8 MG/DL (ref 0.43–1.29)
DEPRECATED RDW RBC AUTO: 38 FL (ref 37–54)
EOSINOPHIL # BLD AUTO: 0.1 10*3/MM3 (ref 0–0.7)
EOSINOPHIL NFR BLD AUTO: 2 % (ref 0–5)
ERYTHROCYTE [DISTWIDTH] IN BLOOD BY AUTOMATED COUNT: 12.7 % (ref 11.5–14.5)
ETHANOL BLD-MCNC: <10 MG/DL
ETHANOL UR QL: <0.01 %
GFR SERPL CREATININE-BSD FRML MDRD: 106 ML/MIN/1.73
GLOBULIN UR ELPH-MCNC: 3 GM/DL
GLUCOSE BLD-MCNC: 322 MG/DL (ref 70–110)
GLUCOSE BLDC GLUCOMTR-MCNC: 260 MG/DL (ref 70–130)
GLUCOSE BLDC GLUCOMTR-MCNC: 269 MG/DL (ref 70–130)
GLUCOSE UR STRIP-MCNC: ABNORMAL MG/DL
HCT VFR BLD AUTO: 44.1 % (ref 42–52)
HGB BLD-MCNC: 16.1 G/DL (ref 14–18)
HGB UR QL STRIP.AUTO: NEGATIVE
IMM GRANULOCYTES # BLD: 0.01 10*3/MM3 (ref 0–0.03)
IMM GRANULOCYTES NFR BLD: 0.2 % (ref 0–0.5)
KETONES UR QL STRIP: ABNORMAL
LEUKOCYTE ESTERASE UR QL STRIP.AUTO: NEGATIVE
LYMPHOCYTES # BLD AUTO: 1.49 10*3/MM3 (ref 1–3)
LYMPHOCYTES NFR BLD AUTO: 29.7 % (ref 21–51)
MCH RBC QN AUTO: 30 PG (ref 27–33)
MCHC RBC AUTO-ENTMCNC: 36.5 G/DL (ref 33–37)
MCV RBC AUTO: 82.3 FL (ref 80–94)
METHADONE UR QL SCN: NEGATIVE
MONOCYTES # BLD AUTO: 0.35 10*3/MM3 (ref 0.1–0.9)
MONOCYTES NFR BLD AUTO: 7 % (ref 0–10)
NEUTROPHILS # BLD AUTO: 3.04 10*3/MM3 (ref 1.4–6.5)
NEUTROPHILS NFR BLD AUTO: 60.7 % (ref 30–70)
NITRITE UR QL STRIP: NEGATIVE
OPIATES UR QL: NEGATIVE
OSMOLALITY SERPL CALC.SUM OF ELEC: 289 MOSM/KG (ref 273–305)
OXYCODONE UR QL SCN: NEGATIVE
PCP UR QL SCN: NEGATIVE
PH UR STRIP.AUTO: <=5 [PH] (ref 5–8)
PLATELET # BLD AUTO: 219 10*3/MM3 (ref 130–400)
PMV BLD AUTO: 10.1 FL (ref 6–10)
POTASSIUM BLD-SCNC: 3.7 MMOL/L (ref 3.5–5.3)
PROT SERPL-MCNC: 7.5 G/DL (ref 6–8)
PROT UR QL STRIP: NEGATIVE
RBC # BLD AUTO: 5.36 10*6/MM3 (ref 4.7–6.1)
SODIUM BLD-SCNC: 139 MMOL/L (ref 135–153)
SP GR UR STRIP: >1.03 (ref 1–1.03)
UROBILINOGEN UR QL STRIP: ABNORMAL
WBC NRBC COR # BLD: 5.01 10*3/MM3 (ref 4.5–12.5)

## 2017-11-01 PROCEDURE — 81003 URINALYSIS AUTO W/O SCOPE: CPT | Performed by: PHYSICIAN ASSISTANT

## 2017-11-01 PROCEDURE — 80307 DRUG TEST PRSMV CHEM ANLYZR: CPT | Performed by: PHYSICIAN ASSISTANT

## 2017-11-01 PROCEDURE — 25010000002 PNEUMOCOCCAL VAC POLYVALENT PER 0.5 ML: Performed by: PSYCHIATRY & NEUROLOGY

## 2017-11-01 PROCEDURE — 80053 COMPREHEN METABOLIC PANEL: CPT | Performed by: PHYSICIAN ASSISTANT

## 2017-11-01 PROCEDURE — 90732 PPSV23 VACC 2 YRS+ SUBQ/IM: CPT | Performed by: PSYCHIATRY & NEUROLOGY

## 2017-11-01 PROCEDURE — 63710000001 INSULIN ASPART PER 5 UNITS: Performed by: PSYCHIATRY & NEUROLOGY

## 2017-11-01 PROCEDURE — 93005 ELECTROCARDIOGRAM TRACING: CPT | Performed by: PSYCHIATRY & NEUROLOGY

## 2017-11-01 PROCEDURE — 82962 GLUCOSE BLOOD TEST: CPT

## 2017-11-01 PROCEDURE — 93010 ELECTROCARDIOGRAM REPORT: CPT | Performed by: INTERNAL MEDICINE

## 2017-11-01 PROCEDURE — G0009 ADMIN PNEUMOCOCCAL VACCINE: HCPCS | Performed by: PSYCHIATRY & NEUROLOGY

## 2017-11-01 PROCEDURE — 85025 COMPLETE CBC W/AUTO DIFF WBC: CPT | Performed by: PHYSICIAN ASSISTANT

## 2017-11-01 PROCEDURE — 63710000001 INSULIN DETEMIR PER 5 UNITS: Performed by: PSYCHIATRY & NEUROLOGY

## 2017-11-01 PROCEDURE — 99214 OFFICE O/P EST MOD 30 MIN: CPT | Performed by: NURSE PRACTITIONER

## 2017-11-01 RX ORDER — TRAZODONE HYDROCHLORIDE 50 MG/1
50 TABLET ORAL NIGHTLY PRN
Status: DISCONTINUED | OUTPATIENT
Start: 2017-11-01 | End: 2017-11-03 | Stop reason: HOSPADM

## 2017-11-01 RX ORDER — BUTALBITAL, ACETAMINOPHEN AND CAFFEINE 50; 325; 40 MG/1; MG/1; MG/1
1 TABLET ORAL 2 TIMES DAILY PRN
Status: DISCONTINUED | OUTPATIENT
Start: 2017-11-01 | End: 2017-11-03 | Stop reason: HOSPADM

## 2017-11-01 RX ORDER — NAPROXEN 250 MG/1
500 TABLET ORAL 2 TIMES DAILY WITH MEALS
Status: CANCELLED | OUTPATIENT
Start: 2017-11-01

## 2017-11-01 RX ORDER — FAMOTIDINE 20 MG/1
20 TABLET, FILM COATED ORAL 2 TIMES DAILY PRN
Status: DISCONTINUED | OUTPATIENT
Start: 2017-11-01 | End: 2017-11-03 | Stop reason: HOSPADM

## 2017-11-01 RX ORDER — NICOTINE 21 MG/24HR
1 PATCH, TRANSDERMAL 24 HOURS TRANSDERMAL
Status: DISCONTINUED | OUTPATIENT
Start: 2017-11-01 | End: 2017-11-01

## 2017-11-01 RX ORDER — LOPERAMIDE HYDROCHLORIDE 2 MG/1
2 CAPSULE ORAL 4 TIMES DAILY PRN
Status: DISCONTINUED | OUTPATIENT
Start: 2017-11-01 | End: 2017-11-03 | Stop reason: HOSPADM

## 2017-11-01 RX ORDER — NICOTINE POLACRILEX 4 MG
15 LOZENGE BUCCAL
Status: DISCONTINUED | OUTPATIENT
Start: 2017-11-01 | End: 2017-11-03 | Stop reason: HOSPADM

## 2017-11-01 RX ORDER — ALUMINA, MAGNESIA, AND SIMETHICONE 2400; 2400; 240 MG/30ML; MG/30ML; MG/30ML
15 SUSPENSION ORAL EVERY 6 HOURS PRN
Status: DISCONTINUED | OUTPATIENT
Start: 2017-11-01 | End: 2017-11-03 | Stop reason: HOSPADM

## 2017-11-01 RX ORDER — CYCLOBENZAPRINE HCL 10 MG
5 TABLET ORAL NIGHTLY PRN
Status: DISCONTINUED | OUTPATIENT
Start: 2017-11-01 | End: 2017-11-03 | Stop reason: HOSPADM

## 2017-11-01 RX ORDER — IBUPROFEN 600 MG/1
600 TABLET ORAL EVERY 6 HOURS PRN
Status: DISCONTINUED | OUTPATIENT
Start: 2017-11-01 | End: 2017-11-03 | Stop reason: HOSPADM

## 2017-11-01 RX ORDER — TOPIRAMATE 100 MG/1
100 TABLET, FILM COATED ORAL EVERY 12 HOURS SCHEDULED
Status: DISCONTINUED | OUTPATIENT
Start: 2017-11-01 | End: 2017-11-03 | Stop reason: HOSPADM

## 2017-11-01 RX ORDER — ECHINACEA PURPUREA EXTRACT 125 MG
2 TABLET ORAL AS NEEDED
Status: DISCONTINUED | OUTPATIENT
Start: 2017-11-01 | End: 2017-11-03 | Stop reason: HOSPADM

## 2017-11-01 RX ORDER — DEXTROSE MONOHYDRATE 25 G/50ML
25 INJECTION, SOLUTION INTRAVENOUS
Status: DISCONTINUED | OUTPATIENT
Start: 2017-11-01 | End: 2017-11-03 | Stop reason: HOSPADM

## 2017-11-01 RX ORDER — HALOPERIDOL 2 MG/1
2 TABLET ORAL 3 TIMES DAILY
Status: DISCONTINUED | OUTPATIENT
Start: 2017-11-01 | End: 2017-11-02

## 2017-11-01 RX ORDER — HYDROXYZINE 50 MG/1
50 TABLET, FILM COATED ORAL EVERY 6 HOURS PRN
Status: DISCONTINUED | OUTPATIENT
Start: 2017-11-01 | End: 2017-11-03 | Stop reason: HOSPADM

## 2017-11-01 RX ORDER — ONDANSETRON 4 MG/1
4 TABLET, FILM COATED ORAL EVERY 6 HOURS PRN
Status: DISCONTINUED | OUTPATIENT
Start: 2017-11-01 | End: 2017-11-03 | Stop reason: HOSPADM

## 2017-11-01 RX ORDER — LISINOPRIL 2.5 MG/1
5 TABLET ORAL DAILY
Status: DISCONTINUED | OUTPATIENT
Start: 2017-11-01 | End: 2017-11-03 | Stop reason: HOSPADM

## 2017-11-01 RX ORDER — BENZONATATE 100 MG/1
100 CAPSULE ORAL 3 TIMES DAILY PRN
Status: DISCONTINUED | OUTPATIENT
Start: 2017-11-01 | End: 2017-11-03 | Stop reason: HOSPADM

## 2017-11-01 RX ADMIN — TOPIRAMATE 100 MG: 100 TABLET, FILM COATED ORAL at 22:03

## 2017-11-01 RX ADMIN — PNEUMOCOCCAL VACCINE POLYVALENT 0.5 ML
25; 25; 25; 25; 25; 25; 25; 25; 25; 25; 25; 25; 25; 25; 25; 25; 25; 25; 25; 25; 25; 25; 25 INJECTION, SOLUTION INTRAMUSCULAR; SUBCUTANEOUS at 22:09

## 2017-11-01 RX ADMIN — INSULIN ASPART 4 UNITS: 100 INJECTION, SOLUTION INTRAVENOUS; SUBCUTANEOUS at 22:01

## 2017-11-01 RX ADMIN — LISINOPRIL 5 MG: 2.5 TABLET ORAL at 22:05

## 2017-11-01 RX ADMIN — TRAZODONE HYDROCHLORIDE 50 MG: 50 TABLET ORAL at 22:03

## 2017-11-01 RX ADMIN — HYDROXYZINE HYDROCHLORIDE 50 MG: 50 TABLET ORAL at 22:04

## 2017-11-01 RX ADMIN — INSULIN ASPART 6 UNITS: 100 INJECTION, SOLUTION INTRAVENOUS; SUBCUTANEOUS at 18:15

## 2017-11-01 RX ADMIN — HALOPERIDOL 2 MG: 2 TABLET ORAL at 22:04

## 2017-11-01 RX ADMIN — INSULIN DETEMIR 30 UNITS: 100 INJECTION, SOLUTION SUBCUTANEOUS at 22:06

## 2017-11-01 NOTE — NURSING NOTE
Intake assessment complete. Pt sent here by his nurse practitioner, Kaycee Aleman. Pt reports that while going over his medications with him he stated that he takes 10 medications that don't do anything but one bullet that would. Pt denies suicidal ideation, denies homicidal ideation, reports the statement was said in jest and did not mean to imply suicidal ideation. Denies any dangerous activity. Denies any alcohol or street drug use. Reports that he was diagnosed with Bipolar DO in 2003, rates depression at 7 and anxiety at 9 on a 1-10 scale. Stressors are work related, as he had a work related injury in September 2016 which is still ongoing with workmans compensation. Pt appears well kept, very forthcoming with information. Alert and oriented x 3. Any questions answered.

## 2017-11-01 NOTE — ED PROVIDER NOTES
Subjective   Patient is a 41 y.o. male presenting with mental health disorder.   Mental Health Problem   Presenting symptoms: depression and suicidal thoughts (intermittently though none a while.)    Degree of incapacity (severity):  Moderate  Onset quality:  Gradual  Duration:  6 months  Timing:  Constant  Chronicity:  Chronic  Context: not alcohol use and not drug abuse    Relieved by:  Nothing  Worsened by:  Nothing  Associated symptoms: feelings of worthlessness    Associated symptoms: no abdominal pain, no anxiety and no chest pain        Review of Systems   Constitutional: Negative.  Negative for fever.   HENT: Negative.    Respiratory: Negative.    Cardiovascular: Negative.  Negative for chest pain.   Gastrointestinal: Negative.  Negative for abdominal pain.   Endocrine: Negative.    Genitourinary: Negative.  Negative for dysuria.   Skin: Negative.    Neurological: Negative.    Psychiatric/Behavioral: Positive for suicidal ideas (intermittently though none a while.). The patient is not nervous/anxious.    All other systems reviewed and are negative.      Past Medical History:   Diagnosis Date   • Anxiety    • Bipolar 1 disorder    • Chronic pain disorder    • Depression    • Diabetes mellitus    • Disease of thyroid gland    • Head injury    • Hyperlipidemia    • Hypertension    • Migraine    • Obesity    • Psychiatric illness        No Known Allergies    Past Surgical History:   Procedure Laterality Date   • KNEE SURGERY      right   • NASAL POLYP EXCISION     • URETHRAL DILATATION      infant       Family History   Problem Relation Age of Onset   • Hypertension Mother    • Hypertension Father    • Diabetes Maternal Grandfather    • Heart disease Maternal Grandfather        Social History     Social History   • Marital status: Single     Spouse name: N/A   • Number of children: N/A   • Years of education: N/A     Social History Main Topics   • Smoking status: Former Smoker     Quit date: 1/1/2009   •  Smokeless tobacco: Never Used   • Alcohol use Yes      Comment: occassional   • Drug use: No   • Sexual activity: Not Currently     Other Topics Concern   • None     Social History Narrative           Objective   Physical Exam   Constitutional: He is oriented to person, place, and time. He appears well-developed and well-nourished. No distress.   HENT:   Head: Normocephalic and atraumatic.   Right Ear: External ear normal.   Left Ear: External ear normal.   Nose: Nose normal.   Eyes: Conjunctivae and EOM are normal. Pupils are equal, round, and reactive to light.   Neck: Normal range of motion. Neck supple. No JVD present. No tracheal deviation present.   Cardiovascular: Normal rate, regular rhythm and normal heart sounds.    No murmur heard.  Pulmonary/Chest: Effort normal and breath sounds normal. No respiratory distress. He has no wheezes.   Abdominal: Soft. Bowel sounds are normal. There is no tenderness.   Musculoskeletal: Normal range of motion. He exhibits no edema or deformity.   Neurological: He is alert and oriented to person, place, and time. No cranial nerve deficit.   Skin: Skin is warm and dry. No rash noted. He is not diaphoretic. No erythema. No pallor.   Psychiatric: He has a normal mood and affect. His behavior is normal. Thought content normal.   Nursing note and vitals reviewed.      Procedures         ED Course  ED Course                  MDM  Number of Diagnoses or Management Options  established and worsening     Amount and/or Complexity of Data Reviewed  Clinical lab tests: ordered and reviewed  Discuss the patient with other providers: yes    Risk of Complications, Morbidity, and/or Mortality  Presenting problems: moderate        Final diagnoses:   Bipolar 1 disorder, mixed, moderate            RIGO Austin  11/01/17 [id number]

## 2017-11-01 NOTE — NURSING NOTE
Therapist Mary Jones called and stated she had evaluated pt today and wanted to let it be known she does not feel safe for him to be discharged. She said he had recently been started on haldol and not slept in days. Not been able to keep a job for 30 days. He expressed worthlessness in life to her and made suicidal statements, including plan of shooting self today. Pt denies these problems to us. Said that anything he mentioned about suicide was just a stress reaction this morning. Represented clinicals to dr Harrison, and received new orders to admit pt to psych.

## 2017-11-01 NOTE — NURSING NOTE
Talked with Dr. Harrison. Pt does not meet admission criteria, and may be discharged home. RBVO x 2. ED provider and patient made aware.

## 2017-11-01 NOTE — DISCHARGE INSTRUCTIONS

## 2017-11-01 NOTE — PROGRESS NOTES
Subjective   Thomas Mireles is a 41 y.o. male.   Chief Compliant: The patient presents with Follow-up (recent appointment with Neuro)    History of Present Illness   Presents today follow up after recent visit to neuro.  Seen Neuro due to daily uncontrolled headache which onset after a work related injury one year ago.  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  Since accident daily headache uncontrolled with several attempts with medication and chiropractic treatments.  Workman's comp has been an aggravation to him as they have not paid nor has his employer reimbursed him for his injury.  He has since obtained a  that is helping with this matter.  Jas has a long history of bipolar on and off of medication over the past 3-4 years after his long term psychiatrist and therapist both retired. Since Jas has been on a roller coaster with no meds and multiple restarts and unable to tolerate side effects.  In the last few months restarted on Topamax and tolerated ok however he continued to miss or leave work repeatedly.  In the month of October he has only been able to work 2 days.    Seen Neurology for headache and as recommended from psych there he was started on haldol and since hasn't slept, poor appetite and feels like he cant be still with jerking inside.  Tried melatonin for sleep however states he felt like it was mechanical sleep and he never fell asleep.  Jas admits to suicidal thoughts and states he feels the lowest he has ever felt in his life.  Denies he has any purpose in life.  Feels like a failure and that he has let his parents down as a  child and an adult.  Jas states one bullet can cure it all.  He states he has thought about where to shoot himself as he would not shoot himself in the head as he would want an autopsy to show what is really wrong within his head.  In february of this year he gave his guns to his father and  "states he has no means to proceed but still considers the option.   The increase stress and aggravation with the inability to work a normal schedule and live a normal life has tremendously  increased his symptoms with bipolar.  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  Today he is upset and fears he will get kicked out of school as he missed last week and today as well.  He states he knows he is in no shape to attend or perform at school.       The following portions of the patient's history were reviewed and updated as appropriate: allergies, current medications, past family history, past medical history, past social history, past surgical history and problem list.      Review of Systems   Constitutional: Positive for activity change and appetite change.   HENT: Negative.    Respiratory: Negative.    Cardiovascular: Negative.    Skin: Negative.    Psychiatric/Behavioral: Positive for agitation, behavioral problems, decreased concentration, sleep disturbance and suicidal ideas. Negative for self-injury. The patient is nervous/anxious and is hyperactive.    All other systems reviewed and are negative.      Procedures    Vitals: Blood pressure 140/96, pulse 109, height 70\" (177.8 cm), weight 248 lb (112 kg), SpO2 98 %.     Allergies: No Known Allergies       Objective   Physical Exam   Constitutional: He is oriented to person, place, and time. He appears well-developed and well-nourished.   Cardiovascular: Normal rate, regular rhythm and normal heart sounds.    No murmur heard.  Pulmonary/Chest: Effort normal and breath sounds normal.   Neurological: He is alert and oriented to person, place, and time.   Skin: Skin is warm and dry.   Psychiatric: His speech is normal and behavior is normal. Judgment and thought content normal. His mood appears anxious. Cognition and memory are normal.   Nursing note and vitals reviewed.      Assessment/Plan   Discussed with patient impression " and plan, patient verbalizes understanding.  Lengthy discussion with Jas regarding treatment plan.  Initially declined psych admission as he feared it would be like [facility].  Mary Jones therapist from the [facility] accompanied me back to his room and following her evaluation agreed to go to the ED for psych intake.  He ask I call his parents who transported him to the hospital     Thomas was seen today for follow-up.    Diagnoses and all orders for this visit:    Bipolar 1 disorder    Feeling suicidal

## 2017-11-01 NOTE — NURSING NOTE
Spoke to patient and explained the concerns of his therapist and the on call dr borrego and verbalized to him that we care about him and want him to get the best care possible. Explained that Dr. Harrison would like him to come in for treatment. Patient verbalized understanding and agreeable to staying. Notified ed provider.

## 2017-11-01 NOTE — NURSING NOTE
Called Dr. Harrison. Went over labs and blood glucose level and hx. Instructed to just put the pt on a moderate dose insulin protocol on the floor and have them cover him on the floor. Rbvox2. Notified Jas on adult psych floor or orders. Information verbalized.

## 2017-11-02 PROBLEM — F31.81 BIPOLAR II DISORDER (HCC): Status: ACTIVE | Noted: 2017-08-02

## 2017-11-02 LAB
GLUCOSE BLDC GLUCOMTR-MCNC: 192 MG/DL (ref 70–130)
GLUCOSE BLDC GLUCOMTR-MCNC: 218 MG/DL (ref 70–130)
GLUCOSE BLDC GLUCOMTR-MCNC: 223 MG/DL (ref 70–130)
GLUCOSE BLDC GLUCOMTR-MCNC: 267 MG/DL (ref 70–130)

## 2017-11-02 PROCEDURE — 99223 1ST HOSP IP/OBS HIGH 75: CPT | Performed by: PSYCHIATRY & NEUROLOGY

## 2017-11-02 PROCEDURE — 82962 GLUCOSE BLOOD TEST: CPT

## 2017-11-02 PROCEDURE — 63710000001 INSULIN ASPART PER 5 UNITS: Performed by: PSYCHIATRY & NEUROLOGY

## 2017-11-02 RX ORDER — QUETIAPINE FUMARATE 25 MG/1
25 TABLET, FILM COATED ORAL NIGHTLY
Status: DISCONTINUED | OUTPATIENT
Start: 2017-11-02 | End: 2017-11-03 | Stop reason: HOSPADM

## 2017-11-02 RX ADMIN — INSULIN ASPART 6 UNITS: 100 INJECTION, SOLUTION INTRAVENOUS; SUBCUTANEOUS at 16:09

## 2017-11-02 RX ADMIN — INSULIN ASPART 4 UNITS: 100 INJECTION, SOLUTION INTRAVENOUS; SUBCUTANEOUS at 11:36

## 2017-11-02 RX ADMIN — QUETIAPINE FUMARATE 25 MG: 25 TABLET, FILM COATED ORAL at 21:10

## 2017-11-02 RX ADMIN — TRAZODONE HYDROCHLORIDE 50 MG: 50 TABLET ORAL at 21:10

## 2017-11-02 RX ADMIN — TOPIRAMATE 100 MG: 100 TABLET, FILM COATED ORAL at 08:08

## 2017-11-02 RX ADMIN — TOPIRAMATE 100 MG: 100 TABLET, FILM COATED ORAL at 21:10

## 2017-11-02 RX ADMIN — INSULIN ASPART 2 UNITS: 100 INJECTION, SOLUTION INTRAVENOUS; SUBCUTANEOUS at 07:19

## 2017-11-02 RX ADMIN — HYDROXYZINE HYDROCHLORIDE 50 MG: 50 TABLET ORAL at 21:10

## 2017-11-02 RX ADMIN — INSULIN ASPART 4 UNITS: 100 INJECTION, SOLUTION INTRAVENOUS; SUBCUTANEOUS at 21:09

## 2017-11-02 RX ADMIN — HALOPERIDOL 2 MG: 2 TABLET ORAL at 08:08

## 2017-11-02 NOTE — PLAN OF CARE
Problem: BH Patient Care Overview (Adult)  Goal: Plan of Care Review  Outcome: Ongoing (interventions implemented as appropriate)    11/02/17 1533   Coping/Psychosocial Response Interventions   Plan Of Care Reviewed With patient   Coping/Psychosocial   Patient Agreement with Plan of Care agrees   Patient Care Overview   Consent Given to Review Plan with [facility] and Kecia Mireles (mother) at [phone]   Progress no change   Outcome Evaluation   Outcome Summary/Follow up Plan Therapist met with Patient to discuss initial assessment and aftercare recommendation. Patient is agreeable.        Goal: Interdisciplinary Rounds/Family Conference  Outcome: Ongoing (interventions implemented as appropriate)    11/02/17 1533   Interdisciplinary Rounds/Family Conf   Summary Treatment Team Evaluations and Staffing    Participants social work;psychiatrist;patient       Goal: Individualization and Mutuality  Outcome: Ongoing (interventions implemented as appropriate)    11/02/17 1452 11/02/17 1533   Behavioral Health Screens   Patient Personal Strengths community support;compliant with treatment/medications;expressive of needs;expressive of emotions;appropriate judgment/decision making;coping skills;family/social support;good impulse control;independent living skills;motivated for treatment;motivated for recovery;interests/hobbies;intellectual cognitive skills;insight into illness/situation;no history of violence;positive attitude;positive educational history;positive vocational history;self-reliant;self-awareness;resilient;resourceful;realistic evaluation of current/future capabilities;socioeconomic stability;spiritual/Sabianist support;stable living environment;tolerant --    Patient Personal Strengths Comment --  Reported long-term goals and hope for the future    Patient Vulnerabilities ineffective coping, mental health diagnosis, limited support  --    Individualization   Patient Specific Goals --   Identify healthy coping methods, complete stabilzation, and comply with aftercare    Patient Specific Interventions --  Therapist will offer 1-4 therapy sessions, daily group, family education, and aftercare planning    Mutuality/Individual Preferences   What Anxieties, Fears or Concerns Do You Have About Your Health or Care? --  none   What Questions Do You Have About Your Health or Care? --  none   What Information Would Help Us Give You More Personalized Care? --  none        Goal: Discharge Needs Assessment  Outcome: Ongoing (interventions implemented as appropriate)    11/02/17 1533   Discharge Needs Assessment   Concerns To Be Addressed coping/stress concerns;financial/insurance concerns;suicidal concerns   Readmission Within The Last 30 Days no previous admission in last 30 days   Community Agency Name(S) [facility]    Current Discharge Risk (SI)   Discharge Planning Comments Therapist met with patient to begin an assessment of needs and aftercare planning. Discharge needs will be ongoing. Patient denies the assistance of transporation at discharge.    Discharge Needs Assessment   Outpatient/Agency/Support Group Needs outpatient counseling   Anticipated Discharge Disposition home with family   Discharge Disposition home with family   Living Environment   Transportation Available family or friend will provide      [id number]     DATA:    Therapist met individually with patient this date for initial evaluation.  Introduced self as therapist; patient agreeable.  Completed psychosocial assessment, integrated summary, reviewed care plans, and discussed hospitalization expectations this date. Patient is agreeable for family involvement and signed consent for his mother Kecia Mireles.  Patient consented for [facility] for outpatient services. Therapist recommended case management services, Patient reports to address that need post discharge.     [id number]   Therapist  "attempted to contact Patient's mother, Kecia Mireles at [phone] this date but was unsuccessful after multiple attempts. Therapist will continue efforts.      ASSESSMENT:   Mr. Dar Mireles is a 41 year old, employed, some college educated, never , male who currently resides with his parents in [place], [place]. Patient presents himself to treatment due to reported SI with no plan. Patient states that he never thinks of a \"plan just the result\". Patient reports a history of inpatient treatment and currently receives psychiatric services in [place]. Patient states that he has a mental health diagnosis of Bipolar Disorder. Patient identified a positive childhood with supportive and concerned parents. Patient idenified his mental diagnosis and current employer as current stressors. Patient explained that he was previously injured at his job and has not recieved any type of financial assistance thus far and now has multiple medical related debt that \"mess with my Bipolar\". Today, Patient reports \"feeling really good\". Patient discussed that his improved mood is not \"normal\" for him and hopes that it continues after treatment.  Patient observed to have a somewhat guarded affect but hopeful mood. Patient was tearful at times while discussing the support of his parents. Today, Patient denies SI/HI.      PLAN:   Patient will receive 24/7 nursing monitoring and daily psychiatrist evaluation.  Patient will meet with a therapist to discuss disposition planning.  Therapist is recommending outpatient services at [facility] in [place], [place]. Patient agreeable this date. Patient reports that he will pay copay if [facility] does not accept his private insurance.   Patient's family will provide transportation   at discharge.        "

## 2017-11-02 NOTE — PLAN OF CARE
Problem:  Patient Care Overview (Adult)  Goal: Plan of Care Review  Outcome: Ongoing (interventions implemented as appropriate)    11/02/17 0454   Coping/Psychosocial Response Interventions   Plan Of Care Reviewed With patient   Coping/Psychosocial   Patient Agreement with Plan of Care agrees   Patient Care Overview   Progress no change   Outcome Evaluation   Outcome Summary/Follow up Plan Patient calm and cooperative this shift.          Problem:  Overarching Goals  Goal: Adheres to Safety Considerations for Self and Others  Outcome: Ongoing (interventions implemented as appropriate)  Goal: Optimized Coping Skills in Response to Life Stressors  Outcome: Ongoing (interventions implemented as appropriate)  Goal: Develops/Participates in Therapeutic [place] to Support Successful Transition  Outcome: Ongoing (interventions implemented as appropriate)

## 2017-11-02 NOTE — H&P
Clinician:  Valdemar Magdaleno   Admission Date: 11/1/2017  1:42 PM 11/02/17      Subjective     Chief Complaint:  Patient is aggravated by his job    HPI:  Thomas Mireles is a 41 y.o. male who is a high school graduate with 3 years of college.  He is a full-time employee.  He is single and has never been  and does not have any children.  He lives with his parents in [facility].  He is a [employer] but doesn't go to [employer] any longer. He has no history of previous psychiatric admissions.  Patient was referred to the emergency department of [facility] by his family healthcare provider who is a nurse practitioner named Kaycee Aleman.  When Ms. Nash was going over patient medications with him and he has stated that he takes 10 medications that don't do anything out one bullet that would.  The nurse practitioner referred him for having thoughts of self-harm.  In the emergency department he said that that this statement was said in jest and did not mean to imply suicidal ideations however today he says that he has been very aggravated and depressed and thoughts of suicide came to his mind.  Patient is aggravated by his work because he had an accident at work over a year ago that caused him concussion and some neurological issues but he has not been receiving his workmen compensation unit and he says even work did not pay for his medical visits.  Patient rates depression 7 and anxiety 9 on a scale of 1-10 and he has been feeling hopeless and helpless.  Patient has diagnoses of bipolar 2 and he explains when he is on his manic phases usually he become very angry and irritable.  He says he never becomes violent at he says he becomes so irritable that people don't want  on to be around him.  He says also when he is manicky he becomes reckless and he spends money senselessly.  Patient's sleep has been poor for the past 2-3 months and has initial, intermediate and terminal insomnia.  Appetite is fair.   Patient is articulate and cooperative during the interview.  He doesn't have any auditory or visual hallucinations and he is not delusional. The patient also reported that his neurologist started him on Haldol for his headaches, and the medication is making him feel worse. He agreed to stop it.  He is admitted for crisis intervention, stabilization and securing his safety.    Past Psych History:   Patient has diagnoses of bipolar 2 affective disease and reports no previous hospitalization.  He was under care of a psychiatrist in [place] who retired in 2013. States that he is getting his outpatient mental health care from [facility].      Substance Abuse:   Patient denies using any illicit drugs.    Family History:  family history includes Diabetes in his maternal grandfather; Heart disease in his maternal grandfather; Hypertension in his father and mother.    Personal and social history:  Patient was born and raised in [place] by both parents.  He still lives with his parents.  He has worked for this [employer] in [place] for the past 9 years.  Patient finished 3 years of college at the [employer] of and did not graduate and now he says he is enrolled in the [employer] in [place] studying culinary arts.  Patient is a Restoration on an used to go to Yazidism however he says he gave up the idea of Mandaeism and at this time he calls himself agnostic.    Medical/Surgical History:  Past Medical History:   Diagnosis Date   • Anxiety    • Bipolar 1 disorder    • Depression    • Head injury 2016   • Hyperlipidemia    • Hypertension    • Migraine    • Obesity    • Psychiatric illness    • Type 2 diabetes mellitus      Past Surgical History:   Procedure Laterality Date   • KNEE SURGERY  1992    right   • NASAL POLYP EXCISION  2009   • URETHRAL DILATATION      infant       No Known Allergies  Social History   Substance Use Topics   • Smoking status: Former Smoker     Quit  date: 1/1/2009   • Smokeless tobacco: Never Used   • Alcohol use Yes      Comment: occassional-Once every 6 months     Current Medications:   Current Facility-Administered Medications   Medication Dose Route Frequency Provider Last Rate Last Dose   • aluminum-magnesium hydroxide-simethicone (MAALOX MAX) 400-400-40 MG/5ML suspension 15 mL  15 mL Oral Q6H PRN Todd Harrison MD       • benzonatate (TESSALON) capsule 100 mg  100 mg Oral TID PRN Todd Harrison MD       • butalbital-acetaminophen-caffeine (FIORICET, ESGIC) -40 MG per tablet 1 tablet  1 tablet Oral BID PRN Todd Harrison MD       • cyclobenzaprine (FLEXERIL) tablet 5 mg  5 mg Oral Nightly PRN Todd Harrison MD       • dextrose (D50W) solution 25 g  25 g Intravenous Q15 Min PRN Todd Harrison MD       • dextrose (D50W) solution 25 g  25 g Intravenous Q15 Min PRN Todd Harrison MD       • dextrose (GLUTOSE) oral gel 15 g  15 g Oral Q15 Min PRN Tdod Harrison MD       • dextrose (GLUTOSE) oral gel 15 g  15 g Oral Q15 Min PRN Todd Harrison MD       • famotidine (PEPCID) tablet 20 mg  20 mg Oral BID PRN Todd Harrison MD       • glucagon (human recombinant) (GLUCAGEN DIAGNOSTIC) injection 1 mg  1 mg Subcutaneous Q15 Min PRN Todd Harrison MD       • glucagon (human recombinant) (GLUCAGEN DIAGNOSTIC) injection 1 mg  1 mg Subcutaneous Q15 Min PRN Todd Harrison MD       • hydrOXYzine (ATARAX) tablet 50 mg  50 mg Oral Q6H PRN Todd Harrison MD   50 mg at 11/01/17 2204   • ibuprofen (ADVIL,MOTRIN) tablet 600 mg  600 mg Oral Q6H PRN Todd Harrison MD       • insulin aspart (novoLOG) injection 0-9 Units  0-9 Units Subcutaneous 4x Daily AC & at Bedtime Todd Harrison MD   4 Units at 11/02/17 1136   • insulin detemir (LEVEMIR) injection 30 Units  30 Units Subcutaneous Daily Todd Harrison MD   30 Units at 11/01/17 2206   • lisinopril (PRINIVIL,ZESTRIL) tablet 5 mg  5 mg Oral Daily Todd Harrison MD   5 mg at 11/01/17 2208   • loperamide (IMODIUM) capsule 2 mg  2 mg Oral 4x  Daily PRN Todd Harrison MD       • magnesium hydroxide (MILK OF MAGNESIA) suspension 2400 mg/10mL 10 mL  10 mL Oral Daily PRN Todd Harrison MD       • ondansetron (ZOFRAN) tablet 4 mg  4 mg Oral Q6H PRN Todd Harrison MD       • QUEtiapine (SEROquel) tablet 25 mg  25 mg Oral Nightly Todd Harrison MD       • sodium chloride (OCEAN) nasal spray 2 spray  2 spray Each Nare PRN Todd Harrison MD       • topiramate (TOPAMAX) tablet 100 mg  100 mg Oral Q12H Todd Harrison MD   100 mg at 11/02/17 0808   • traZODone (DESYREL) tablet 50 mg  50 mg Oral Nightly PRN Todd Harrison MD   50 mg at 11/01/17 2203       Review of Systems    Review of Systems - General ROS: negative for - chills, fever or malaise  Ophthalmic ROS: negative for - loss of vision  ENT ROS: negative for - hearing change  Allergy and Immunology ROS: negative for - hives  Hematological and Lymphatic ROS: negative for - bleeding problems  Endocrine ROS: negative for - skin changes  Respiratory ROS: no cough, shortness of breath, or wheezing  Cardiovascular ROS: no chest pain or dyspnea on exertion  Gastrointestinal ROS: no abdominal pain, change in bowel habits, or black or bloody stools  Genito-Urinary ROS: no dysuria, trouble voiding, or hematuria  Musculoskeletal ROS: negative for - gait disturbance  Neurological ROS: no TIA or stroke symptoms  Dermatological ROS: negative for rash    Objective       General Appearance:    Alert, cooperative, in no acute distress   Head:    Normocephalic, without obvious abnormality, atraumatic   Eyes:            Lids and lashes normal, conjunctivae and sclerae normal, no   icterus, no pallor, corneas clear, PERRLA   Ears:    Ears appear intact with no abnormalities noted   Throat:   No oral lesions, no thrush, oral mucosa moist   Neck:   No adenopathy, supple, trachea midline, no thyromegaly, no   carotid bruit, no JVD   Back:     No kyphosis present, no scoliosis present, no skin lesions,      erythema or scars, no tenderness  "to percussion or                   palpation,   range of motion normal   Lungs:     Clear to auscultation,respirations regular, even and                  unlabored    Heart:    Regular rhythm and normal rate, normal S1 and S2, no            murmur, no gallop, no rub, no click   Chest Wall:    No abnormalities observed   Abdomen:     Normal bowel sounds, no masses, no organomegaly, soft        non-tender, non-distended, no guarding, no rebound                tenderness   Rectal:     Deferred   Extremities:   Moves all extremities well, no edema, no cyanosis, no             redness   Pulses:   Pulses palpable and equal bilaterally   Skin:   No bleeding, bruising or rash   Lymph nodes:   No palpable adenopathy   Neurologic:   Cranial nerves 2 - 12 grossly intact, sensation intact, DTR       present and equal bilaterally       Blood pressure 127/79, pulse 90, temperature 97.5 °F (36.4 °C), temperature source Temporal Artery , resp. rate 18, height 70\" (177.8 cm), weight 245 lb 12.8 oz (111 kg), SpO2 97 %.    Mental Status Exam:   Hygiene:   fair  Cooperation:  Cooperative  Eye Contact:  Fair  Psychomotor Behavior:  Aggitated  Affect:  Restricted  Hopelessness: 7  Speech:  Pressured  Thought Process:  Goal directed  Thought Content:  Mood congurent  Suicidal:  Suicidal Ideation  Homicidal:  None  Hallucinations:  None  Delusion:  None  Memory:  Intact  Orientation:  Person, Place, Time and Situation  Reliability:  fair  Insight:  Poor  Judgement:  Poor  Impulse Control:  Poor  Physical/Medical Issues:  See medical history    Medical Decision Making:   Labs:     Lab Results (last 24 hours)     Procedure Component Value Units Date/Time    POC Glucose Fingerstick [[id number]]  (Abnormal) Collected:  11/01/17 1741    Specimen:  Blood Updated:  11/01/17 1748     Glucose 269 (H) mg/dL     Narrative:       Meter: [id number] : [id number] Radha Gonzalez    POC Glucose Fingerstick [[id number]]  (Abnormal) Collected:  11/01/17 2153 "    Specimen:  Blood Updated:  11/01/17 2206     Glucose 260 (H) mg/dL     Narrative:       Meter: [id number] : [id number] Maria R Reina    POC Glucose Fingerstick [[id number]]  (Abnormal) Collected:  11/02/17 0616    Specimen:  Blood Updated:  11/02/17 0623     Glucose 192 (H) mg/dL     Narrative:       Meter: [id number] : [id number] Maria R [place]    POC Glucose Fingerstick [[id number]]  (Abnormal) Collected:  11/02/17 1127    Specimen:  Blood Updated:  11/02/17 1135     Glucose 218 (H) mg/dL     Narrative:       Meter: [id number] : [id number] Radha Gonzalez                          Lab Results   Component Value Date    WBC 5.01 11/01/2017    HGB 16.1 11/01/2017    HCT 44.1 11/01/2017    MCV 82.3 11/01/2017     11/01/2017     Lab Results   Component Value Date    GLUCOSE 322 (H) 11/01/2017    BUN 10 11/01/2017    CREATININE 0.80 11/01/2017    EGFRIFNONA 106 11/01/2017    EGFRIFAFRI >150 09/14/2016    BCR 12.5 11/01/2017    CO2 19.3 (L) 11/01/2017    CALCIUM 9.6 11/01/2017    PROTENTOTREF 7.4 09/14/2016    ALBUMIN 4.50 11/01/2017    LABIL2 1.5 11/01/2017    AST 19 11/01/2017    ALT 26 11/01/2017        Radiology:     Imaging Results (last 24 hours)     ** No results found for the last 24 hours. **            EKG:     ECG/EMG Results (most recent)     Procedure Component Value Units Date/Time    ECG 12 Lead [[id number]] Collected:  11/01/17 1745     Updated:  11/02/17 1236    Narrative:       Test Reason : Potential adverse reaction to medications.  Blood Pressure : **/** mmHG  Vent. Rate : 106 BPM     Atrial Rate : 106 BPM     P-R Int : 172 ms          QRS Dur : 092 ms      QT Int : 322 ms       P-R-T Axes : 044 -42 049 degrees     QTc Int : 427 ms    Sinus tachycardia  Left axis deviation  Abnormal ECG  No previous ECGs available  Confirmed by Keegan Mccartney ([id number]) on 11/2/2017 12:36:23 PM    Referred By:  CARL           Confirmed By:Keegan Mccartney           Medications:     insulin aspart 0-9 Units  Subcutaneous 4x Daily AC & at Bedtime   insulin detemir 30 Units Subcutaneous Daily   lisinopril 5 mg Oral Daily   QUEtiapine 25 mg Oral Nightly   topiramate 100 mg Oral Q12H       •  aluminum-magnesium hydroxide-simethicone  •  benzonatate  •  butalbital-acetaminophen-caffeine  •  cyclobenzaprine  •  dextrose  •  dextrose  •  dextrose  •  dextrose  •  famotidine  •  glucagon (human recombinant)  •  glucagon (human recombinant)  •  hydrOXYzine  •  ibuprofen  •  loperamide  •  magnesium hydroxide  •  ondansetron  •  sodium chloride  •  traZODone   All medications reviewed.    Special Precautions: Continue current level of Special Precautions.            Assessment/Plan     Patient Active Problem List   Diagnosis Code   • Hypertension I10   • Hyperlipidemia E78.5   • Type 2 diabetes mellitus with hyperglycemia E11.65   • Bipolar II disorder F31.81   • Rosacea L71.9     Bipolar II disrorder: Continue Topamax, add Seroquel, stop Haldol. RBSE discussed with the patient.  HTN: Lisinopril  Hyperlipidemia: The patient has not taken any medication for a while, states that he has taken statins in the past and has had adverse effects. He states that his pcp is monitoring lipids and he is trying to control it with diet and exercise.  Diabetes: Levemir and sliding scale coverage.  Rosacea: Not an active issue at this time.      Patient is admitted to adult psychiatry unit and is on routine orders and precautions level III to secure his safety.  He is on psychotropic medications as scheduled and when necessary.  He is followed with daily clinical evaluations and med management.  He is assigned to a master level counselor working with him in individual, group and family sessions and helping him with disposition planning.  Any further treatment will be done per course.  Patient is actively participating in development of a treatment plan for himself in the hospital.      We discussed risks, benefits, and side effects of the above  medication and the patient was agreeable with the plan.     Patient be was generated from Dr. Harrison,s patient's evaluation, documentation, verbal discussion and instruction.         Attestation:   Physician Attestation: I attest that the above note accurately reflects work and decisions made by me.

## 2017-11-02 NOTE — PLAN OF CARE
Problem: BH Patient Care Overview (Adult)  Goal: Plan of Care Review  Outcome: Ongoing (interventions implemented as appropriate)  Pt. Verbalizes  slept 6 hours rates anx. 5/dep 4 he verbalizes has thoughts to hurt self denies a plan he verbalizes  medications are helping calm cooperative behavior     11/02/17 1812   Coping/Psychosocial Response Interventions   Plan Of Care Reviewed With patient   Coping/Psychosocial   Patient Agreement with Plan of Care agrees   Patient Care Overview   Progress improving         Problem:  Overarching Goals  Goal: Adheres to Safety Considerations for Self and Others  Outcome: Ongoing (interventions implemented as appropriate)  Goal: Optimized Coping Skills in Response to Life Stressors  Outcome: Ongoing (interventions implemented as appropriate)  Goal: Develops/Participates in Therapeutic [place] to Support Successful Transition  Outcome: Ongoing (interventions implemented as appropriate)

## 2017-11-02 NOTE — DISCHARGE INSTR - APPOINTMENTS
[facility]   [address] [place] [id number]  [phone]    November 6 2017 at 9:00am with Tasha  Co-Pay of $15.82 for visit.

## 2017-11-03 VITALS
TEMPERATURE: 98.1 F | OXYGEN SATURATION: 98 % | HEART RATE: 99 BPM | DIASTOLIC BLOOD PRESSURE: 87 MMHG | WEIGHT: 245.8 LBS | BODY MASS INDEX: 35.19 KG/M2 | HEIGHT: 70 IN | RESPIRATION RATE: 18 BRPM | SYSTOLIC BLOOD PRESSURE: 129 MMHG

## 2017-11-03 LAB
CHOLEST SERPL-MCNC: 204 MG/DL (ref 0–200)
GLUCOSE BLDC GLUCOMTR-MCNC: 244 MG/DL (ref 70–130)
HBA1C MFR BLD: 11 % (ref 4.5–5.7)
HDLC SERPL-MCNC: 31 MG/DL (ref 60–100)
LDLC SERPL CALC-MCNC: 98 MG/DL (ref 0–100)
LDLC/HDLC SERPL: 3.15 {RATIO}
TRIGL SERPL-MCNC: 376 MG/DL (ref 0–150)
VLDLC SERPL-MCNC: 75.2 MG/DL

## 2017-11-03 PROCEDURE — 80061 LIPID PANEL: CPT | Performed by: PSYCHIATRY & NEUROLOGY

## 2017-11-03 PROCEDURE — 82962 GLUCOSE BLOOD TEST: CPT

## 2017-11-03 PROCEDURE — 63710000001 INSULIN ASPART PER 5 UNITS: Performed by: PSYCHIATRY & NEUROLOGY

## 2017-11-03 PROCEDURE — 83036 HEMOGLOBIN GLYCOSYLATED A1C: CPT | Performed by: PSYCHIATRY & NEUROLOGY

## 2017-11-03 PROCEDURE — 63710000001 INSULIN DETEMIR PER 5 UNITS: Performed by: PSYCHIATRY & NEUROLOGY

## 2017-11-03 PROCEDURE — 99238 HOSP IP/OBS DSCHRG MGMT 30/<: CPT | Performed by: PSYCHIATRY & NEUROLOGY

## 2017-11-03 RX ORDER — QUETIAPINE FUMARATE 25 MG/1
25 TABLET, FILM COATED ORAL NIGHTLY
Qty: 30 TABLET | Refills: 0 | Status: SHIPPED | OUTPATIENT
Start: 2017-11-03 | End: 2017-11-09 | Stop reason: SDUPTHER

## 2017-11-03 RX ADMIN — INSULIN ASPART 4 UNITS: 100 INJECTION, SOLUTION INTRAVENOUS; SUBCUTANEOUS at 08:04

## 2017-11-03 RX ADMIN — TOPIRAMATE 100 MG: 100 TABLET, FILM COATED ORAL at 08:03

## 2017-11-03 RX ADMIN — LISINOPRIL 5 MG: 2.5 TABLET ORAL at 08:02

## 2017-11-03 RX ADMIN — INSULIN DETEMIR 30 UNITS: 100 INJECTION, SOLUTION SUBCUTANEOUS at 08:41

## 2017-11-03 NOTE — PLAN OF CARE
"Problem: BH Patient Care Overview (Adult)  Goal: Plan of Care Review  Outcome: Ongoing (interventions implemented as appropriate)    11/03/17 0844   Coping/Psychosocial Response Interventions   Plan Of Care Reviewed With patient   Coping/Psychosocial   Patient Agreement with Plan of Care agrees   Patient Care Overview   Consent Given to Review Plan with Kecia Mireles (mother)    Progress progress toward functional goals as expected   Outcome Evaluation   Outcome Summary/Follow up Plan Therapist met with Patient this date to discuss treatment and discharge concerns       [id number]     Data:  Patient engaged Therapist this morning stating, \"I guess I need to go ahead and tell you I am being discharged today\". Patient reported that he previously seen his doctor and was in the process of being discharged. Patient went on to say that he has already contacted his parents to inform them and to arrange transportation. Patient discussed feelings of relief and gratitude of being discharged. Patient reported that he believed that his prescribed Seroquel has made a positive improvement in his overall mood. Patient voiced long-term goals and was agreeable to seek out help if needed in the future if SI reoccurs.      Therapist contacted Patient's mother, Kecia this date to discuss discharge, aftercare, and safety planning. Kecia was agreeable. Kecia confirmed that Patient will be returning home upon discharge. Kecia reported that the home is a safe and supportive environment for the patient. Therapist informed Kecia of Patient's recommended aftercare appointments. Kecia acknowledged and was also agreeable to provide transportation.     Assessment:  Patient appeared to be casually groomed and cooperative this date. Patient denied any anxiety, depression, or SI/HI today. Patient appeared focused of returning home to continue with his school work and to spend time with his family for his upcoming birthday. Patient observed to " have a pleasant mood and hopeful affect.      Plan:  Patient will follow-up with [facility] in [place], [place] on November 6th for outpatient services.      Patient will not need RTEC. Family will provide transportation.      Patient will reside with his parents who are supportive and encouraging of Patient's well-being.

## 2017-11-03 NOTE — PLAN OF CARE
Problem: BH Patient Care Overview (Adult)  Goal: Plan of Care Review  Outcome: Ongoing (interventions implemented as appropriate)  Rates anxiety and depression as 2. Denies S.I. Quiet on unit. Appeared to sleep well this shift.    11/03/17 0441   Coping/Psychosocial Response Interventions   Plan Of Care Reviewed With patient   Coping/Psychosocial   Patient Agreement with Plan of Care agrees   Patient Care Overview   Progress improving         Problem:  Overarching Goals  Goal: Adheres to Safety Considerations for Self and Others  Outcome: Ongoing (interventions implemented as appropriate)  Goal: Optimized Coping Skills in Response to Life Stressors  Outcome: Ongoing (interventions implemented as appropriate)  Goal: Develops/Participates in Therapeutic [place] to Support Successful Transition  Outcome: Ongoing (interventions implemented as appropriate)

## 2017-11-03 NOTE — DISCHARGE SUMMARY
"      PSYCHIATRIC DISCHARGE SUMMARY     Patient Identification:  Name:  Thomas Mireles  Age:  41 y.o.  Sex:  male  :  1975  MRN:  [id number]  Visit Number:  [id number]      Date of Admission:2017   Date of Discharge:  11/3/2017    Discharge Diagnosis:  Active Problems:  Bipolar II disorder    Hypertension   Hyperlipidemia   Type 2 diabetes mellitus with hyperglycemia            Admission Diagnosis:  Bipolar 1 disorder, mixed, moderate [F31.62]     Hospital Course  Patient is a 41 y.o. male presented with reported suicidal ideations. He was at his pcp and made a remark there that he was on too many pills and one bullet would do better. He was sent to the ER where he denied having suicidal ideations and reported that he made the comment in jest. His outpatient therapist was very concerned about his safety, and the patient agreed to come to the hospital. The patient was admitted to the [facility] [place] [facility]. The patient admitted that he was feeling depressed and part of the reason was an inability to work. He reported suffering a concussion at work and dealing with headaches. He has a history of bipolar disorder and reported that he was taking Topamax for it and it was helping. He reported that his neurologist had started him on Haldol for his headaches and it was making him feel worse. He agreed to stop Haldol and start Seroquel 25 mg hs. He was seen the next day, and reported feeling a lot better. Stated that he was able to sleep really good and Seroquel was helping. He denied any thoughts of harm to self or others and felt ready to go home.       Mental Status Exam upon discharge:   Mood \"good\"   Affect-congruent, appropriate, stable  Thought Content-goal directed, no delusional material present  Thought process-linear, organized.  Suicidality: No SI  Homicidality: No HI  Perception: No AH/VH    Procedures Performed         Consults:   Consults     No orders found from 10/3/2017 to 2017.    "       Pertinent Test Results: HbA1C 11, Total cholesterol 204, HDL 31, Triglycerides 376    Condition on Discharge:  improved    Vital Signs  Temp:  [97.5 °F (36.4 °C)-99.2 °F (37.3 °C)] 99.2 °F (37.3 °C)  Heart Rate:  [87-91] 91  Resp:  [18] 18  BP: (117-128)/(80-87) 117/80      Discharge Disposition:  Home or Self Care    Discharge Medications:   Thomas Mireles   Home Medication Instructions POOJA:[id number]    Printed on:11/03/17 0821   Medication Information                      butalbital-aspirin-caffeine (FIORINAL) -40 MG per capsule  Take 1 capsule by mouth 2 (Two) Times a Day As Needed for Headache.             cyclobenzaprine (FLEXERIL) 5 MG tablet  Take 1 tablet by mouth At Night As Needed for Muscle Spasms.             insulin degludec (TRESIBA FLEXTOUCH) 100 UNIT/ML solution pen-injector injection  Inject 40 Units under the skin Daily.             lisinopril (PRINIVIL,ZESTRIL) 5 MG tablet  Take 1 tablet by mouth Daily.             naproxen (NAPROSYN) 500 MG tablet  Take 1 tablet by mouth 2 (Two) Times a Day With Meals.             NUEDEXTA 20-10 MG capsule capsule  Take 1 capsule by mouth 2 (Two) Times a Day.             QUEtiapine (SEROquel) 25 MG tablet  Take 1 tablet by mouth Every Night.             topiramate (TOPAMAX) 100 MG tablet  Take 1 tablet by mouth 2 (Two) Times a Day.                 Discharge Diet: Consistent carbohydrate    Activity at Discharge: As tolerated    Follow-up Appointments  Future Appointments  Date Time Provider Department Center   1/11/2018 3:00 PM LAUREN Majano [facility] None   1/29/2018 1:00 PM Jered De Jesus MD [facility] None         Test Results Pending at Discharge      Clinician:   Todd Harrison MD  11/03/17  8:21 AM

## 2017-11-09 RX ORDER — QUETIAPINE FUMARATE 25 MG/1
25 TABLET, FILM COATED ORAL NIGHTLY
Qty: 90 TABLET | Refills: 0 | Status: SHIPPED | OUTPATIENT
Start: 2017-11-09 | End: 2018-07-11 | Stop reason: SDUPTHER

## 2017-12-15 ENCOUNTER — TELEPHONE (OUTPATIENT)
Dept: FAMILY MEDICINE CLINIC | Facility: CLINIC | Age: 42
End: 2017-12-15

## 2017-12-15 NOTE — TELEPHONE ENCOUNTER
for his disability called requesting to talk to you personally at [phone] Andrei Tyson would not tell me what she needs to discuss stated she must discuss with you personally.

## 2018-01-11 ENCOUNTER — OFFICE VISIT (OUTPATIENT)
Dept: FAMILY MEDICINE CLINIC | Facility: CLINIC | Age: 43
End: 2018-01-11

## 2018-01-11 VITALS
HEIGHT: 70 IN | SYSTOLIC BLOOD PRESSURE: 134 MMHG | DIASTOLIC BLOOD PRESSURE: 88 MMHG | OXYGEN SATURATION: 99 % | WEIGHT: 241 LBS | HEART RATE: 97 BPM | BODY MASS INDEX: 34.5 KG/M2

## 2018-01-11 DIAGNOSIS — F31.81 BIPOLAR II DISORDER (HCC): ICD-10-CM

## 2018-01-11 DIAGNOSIS — L73.9 FOLLICULITIS: Primary | ICD-10-CM

## 2018-01-11 PROCEDURE — 99214 OFFICE O/P EST MOD 30 MIN: CPT | Performed by: NURSE PRACTITIONER

## 2018-01-11 RX ORDER — SULFAMETHOXAZOLE AND TRIMETHOPRIM 800; 160 MG/1; MG/1
1 TABLET ORAL 2 TIMES DAILY
Qty: 20 TABLET | Refills: 0 | Status: SHIPPED | OUTPATIENT
Start: 2018-01-11 | End: 2018-01-25

## 2018-01-11 NOTE — PROGRESS NOTES
"Subjective   Thomas Mireles is a 42 y.o. male.   Chief Compliant: The patient presents with Follow-up; Depression; and Rash    Depression   Visit Type: follow-up (SInce last here well established with therapist since psych hospital stay .  States he needs an antidepressant although he is the best he has been in some time. )  Patient presents with the following symptoms: decreased concentration, depressed mood (mood very low through the holiday.  Admits this was a very trying time.  Has not worked since hospital stay jjust now able to leve his home again ), excessive worry, fatigue, feelings of hopelessness, feelings of worthlessness, irritability and malaise.  Patient is not experiencing: suicidal ideas and suicidal planning.  Frequency of symptoms: constantly   Sleep quality: fair  Nighttime awakenings: occasional    Rash   This is a new problem. The current episode started 1 to 4 weeks ago. The problem has been waxing and waning since onset. The affected locations include the groin. The rash is characterized by blistering. Past treatments include anti-itch cream and topical steroids. The treatment provided mild relief.      The following portions of the patient's history were reviewed and updated as appropriate: allergies, current medications, past family history, past medical history, past social history, past surgical history and problem list.      Review of Systems   Constitutional: Positive for irritability.   HENT: Negative.    Skin: Positive for rash.   Psychiatric/Behavioral: Positive for decreased concentration. Negative for self-injury, sleep disturbance and suicidal ideas.   All other systems reviewed and are negative.      Procedures    Vitals: Blood pressure 134/88, pulse 97, height 177.8 cm (70\"), weight 109 kg (241 lb), SpO2 99 %.     Allergies: No Known Allergies       Objective   Physical Exam   Constitutional: He is oriented to person, place, and time. He appears well-developed and well-nourished. " No distress.   HENT:   Head: Normocephalic.   Cardiovascular: Normal rate, regular rhythm and normal heart sounds.    No murmur heard.  Pulmonary/Chest: Effort normal and breath sounds normal. No respiratory distress. He has no wheezes.   Neurological: He is alert and oriented to person, place, and time.   Skin: Skin is warm and dry. Rash (follicular cyst right pernieum of various stages) noted. He is not diaphoretic.   Psychiatric: He has a normal mood and affect. His behavior is normal.   Nursing note and vitals reviewed.      Assessment/Plan   Discussed with patient impression and plan, patient verbalizes understanding  Skin care advised  Patient agreed to follow with psych    Thomas was seen today for follow-up, depression and rash.    Diagnoses and all orders for this visit:    Folliculitis    Bipolar II disorder    Other orders  -     sertraline (ZOLOFT) 50 MG tablet; Take 1 tablet by mouth Daily.  -     sulfamethoxazole-trimethoprim (BACTRIM DS,SEPTRA DS) 800-160 MG per tablet; Take 1 tablet by mouth 2 (Two) Times a Day.

## 2018-01-22 ENCOUNTER — TELEPHONE (OUTPATIENT)
Dept: FAMILY MEDICINE CLINIC | Facility: CLINIC | Age: 43
End: 2018-01-22

## 2018-01-22 RX ORDER — ONDANSETRON HYDROCHLORIDE 8 MG/1
8 TABLET, FILM COATED ORAL EVERY 8 HOURS PRN
Qty: 20 TABLET | Refills: 0 | Status: SHIPPED | OUTPATIENT
Start: 2018-01-22 | End: 2018-01-25

## 2018-01-22 NOTE — TELEPHONE ENCOUNTER
Mother called reports he has been vomiting since Thursday,cannot keep anything down,first thought it was the Zoloft he had started but he hasnt had that since Thursday,no fever, no appetite,does he need to come in or do you want to try some Zofran or Phenergan?    Verbal order per Kaycee AGUILAR for Zofran,sent to pharmacy as ordered. Mother notified.

## 2018-01-25 ENCOUNTER — OFFICE VISIT (OUTPATIENT)
Dept: FAMILY MEDICINE CLINIC | Facility: CLINIC | Age: 43
End: 2018-01-25

## 2018-01-25 ENCOUNTER — TRANSCRIBE ORDERS (OUTPATIENT)
Dept: INFUSION THERAPY | Facility: HOSPITAL | Age: 43
End: 2018-01-25

## 2018-01-25 ENCOUNTER — APPOINTMENT (OUTPATIENT)
Dept: ULTRASOUND IMAGING | Facility: HOSPITAL | Age: 43
End: 2018-01-25

## 2018-01-25 ENCOUNTER — APPOINTMENT (OUTPATIENT)
Dept: INFUSION THERAPY | Facility: HOSPITAL | Age: 43
End: 2018-01-25

## 2018-01-25 ENCOUNTER — APPOINTMENT (OUTPATIENT)
Dept: CT IMAGING | Facility: HOSPITAL | Age: 43
End: 2018-01-25

## 2018-01-25 ENCOUNTER — HOSPITAL ENCOUNTER (EMERGENCY)
Facility: HOSPITAL | Age: 43
Discharge: HOME | End: 2018-01-25
Attending: EMERGENCY MEDICINE | Admitting: EMERGENCY MEDICINE

## 2018-01-25 ENCOUNTER — APPOINTMENT (OUTPATIENT)
Dept: GENERAL RADIOLOGY | Facility: HOSPITAL | Age: 43
End: 2018-01-25

## 2018-01-25 VITALS
WEIGHT: 210 LBS | BODY MASS INDEX: 30.06 KG/M2 | HEIGHT: 70 IN | TEMPERATURE: 98.5 F | HEART RATE: 108 BPM | RESPIRATION RATE: 16 BRPM | DIASTOLIC BLOOD PRESSURE: 97 MMHG | SYSTOLIC BLOOD PRESSURE: 145 MMHG | OXYGEN SATURATION: 100 %

## 2018-01-25 VITALS
DIASTOLIC BLOOD PRESSURE: 88 MMHG | HEART RATE: 120 BPM | HEIGHT: 70 IN | TEMPERATURE: 98.2 F | WEIGHT: 219.8 LBS | BODY MASS INDEX: 31.47 KG/M2 | SYSTOLIC BLOOD PRESSURE: 150 MMHG | OXYGEN SATURATION: 97 %

## 2018-01-25 DIAGNOSIS — E86.0 DEHYDRATION: Primary | ICD-10-CM

## 2018-01-25 DIAGNOSIS — R94.31 ABNORMAL EKG: ICD-10-CM

## 2018-01-25 DIAGNOSIS — R06.6 HICCUPS: ICD-10-CM

## 2018-01-25 DIAGNOSIS — K59.00 CONSTIPATION, UNSPECIFIED CONSTIPATION TYPE: ICD-10-CM

## 2018-01-25 DIAGNOSIS — R11.10 VOMITING, INTRACTABILITY OF VOMITING NOT SPECIFIED, PRESENCE OF NAUSEA NOT SPECIFIED, UNSPECIFIED VOMITING TYPE: ICD-10-CM

## 2018-01-25 DIAGNOSIS — K22.4 ESOPHAGEAL SPASM: ICD-10-CM

## 2018-01-25 DIAGNOSIS — R11.0 NAUSEA: ICD-10-CM

## 2018-01-25 DIAGNOSIS — R11.2 NAUSEA AND VOMITING IN ADULT: Primary | ICD-10-CM

## 2018-01-25 DIAGNOSIS — R11.2 NAUSEA AND VOMITING, INTRACTABILITY OF VOMITING NOT SPECIFIED, UNSPECIFIED VOMITING TYPE: Primary | ICD-10-CM

## 2018-01-25 LAB
6-ACETYL MORPHINE: NEGATIVE
ALBUMIN SERPL-MCNC: 4.4 G/DL (ref 3.5–5)
ALBUMIN SERPL-MCNC: 4.4 G/DL (ref 3.5–5)
ALBUMIN/GLOB SERPL: 1.5 G/DL (ref 1.5–2.5)
ALBUMIN/GLOB SERPL: 1.5 G/DL (ref 1.5–2.5)
ALP SERPL-CCNC: 105 U/L (ref 40–129)
ALP SERPL-CCNC: 108 U/L (ref 40–129)
ALT SERPL W P-5'-P-CCNC: 18 U/L (ref 10–44)
ALT SERPL W P-5'-P-CCNC: 21 U/L (ref 10–44)
AMPHET+METHAMPHET UR QL: NEGATIVE
ANION GAP SERPL CALCULATED.3IONS-SCNC: 11.7 MMOL/L (ref 3.6–11.2)
ANION GAP SERPL CALCULATED.3IONS-SCNC: 8.2 MMOL/L (ref 3.6–11.2)
AST SERPL-CCNC: 15 U/L (ref 10–34)
AST SERPL-CCNC: 18 U/L (ref 10–34)
BARBITURATES UR QL SCN: NEGATIVE
BASOPHILS # BLD AUTO: 0.02 10*3/MM3 (ref 0–0.3)
BASOPHILS # BLD AUTO: 0.02 10*3/MM3 (ref 0–0.3)
BASOPHILS NFR BLD AUTO: 0.2 % (ref 0–2)
BASOPHILS NFR BLD AUTO: 0.3 % (ref 0–2)
BENZODIAZ UR QL SCN: NEGATIVE
BILIRUB SERPL-MCNC: 1.2 MG/DL (ref 0.2–1.8)
BILIRUB SERPL-MCNC: 1.2 MG/DL (ref 0.2–1.8)
BILIRUB UR QL STRIP: NEGATIVE
BUN BLD-MCNC: 14 MG/DL (ref 7–21)
BUN BLD-MCNC: 15 MG/DL (ref 7–21)
BUN/CREAT SERPL: 16.7 (ref 7–25)
BUN/CREAT SERPL: 18.1 (ref 7–25)
BUPRENORPHINE SERPL-MCNC: NEGATIVE NG/ML
CALCIUM SPEC-SCNC: 9.4 MG/DL (ref 7.7–10)
CALCIUM SPEC-SCNC: 9.5 MG/DL (ref 7.7–10)
CANNABINOIDS SERPL QL: NEGATIVE
CHLORIDE SERPL-SCNC: 89 MMOL/L (ref 99–112)
CHLORIDE SERPL-SCNC: 90 MMOL/L (ref 99–112)
CLARITY UR: CLEAR
CO2 SERPL-SCNC: 27.3 MMOL/L (ref 24.3–31.9)
CO2 SERPL-SCNC: 27.8 MMOL/L (ref 24.3–31.9)
COCAINE UR QL: NEGATIVE
COLOR UR: YELLOW
CREAT BLD-MCNC: 0.83 MG/DL (ref 0.43–1.29)
CREAT BLD-MCNC: 0.84 MG/DL (ref 0.43–1.29)
DEPRECATED RDW RBC AUTO: 35.5 FL (ref 37–54)
DEPRECATED RDW RBC AUTO: 36.6 FL (ref 37–54)
EOSINOPHIL # BLD AUTO: 0.11 10*3/MM3 (ref 0–0.7)
EOSINOPHIL # BLD AUTO: 0.14 10*3/MM3 (ref 0–0.7)
EOSINOPHIL NFR BLD AUTO: 1.3 % (ref 0–5)
EOSINOPHIL NFR BLD AUTO: 1.8 % (ref 0–5)
ERYTHROCYTE [DISTWIDTH] IN BLOOD BY AUTOMATED COUNT: 12.2 % (ref 11.5–14.5)
ERYTHROCYTE [DISTWIDTH] IN BLOOD BY AUTOMATED COUNT: 12.4 % (ref 11.5–14.5)
EXPIRATION DATE: NORMAL
FLUAV AG NPH QL: NORMAL
FLUBV AG NPH QL: NORMAL
GFR SERPL CREATININE-BSD FRML MDRD: 100 ML/MIN/1.73
GFR SERPL CREATININE-BSD FRML MDRD: 101 ML/MIN/1.73
GLOBULIN UR ELPH-MCNC: 3 GM/DL
GLOBULIN UR ELPH-MCNC: 3 GM/DL
GLUCOSE BLD-MCNC: 359 MG/DL (ref 70–110)
GLUCOSE BLD-MCNC: 422 MG/DL (ref 70–110)
GLUCOSE UR STRIP-MCNC: ABNORMAL MG/DL
HBA1C MFR BLD: 11.1 % (ref 4.5–5.7)
HCT VFR BLD AUTO: 44.2 % (ref 42–52)
HCT VFR BLD AUTO: 44.3 % (ref 42–52)
HGB BLD-MCNC: 16.2 G/DL (ref 14–18)
HGB BLD-MCNC: 16.3 G/DL (ref 14–18)
HGB UR QL STRIP.AUTO: NEGATIVE
IMM GRANULOCYTES # BLD: 0.02 10*3/MM3 (ref 0–0.03)
IMM GRANULOCYTES # BLD: 0.03 10*3/MM3 (ref 0–0.03)
IMM GRANULOCYTES NFR BLD: 0.3 % (ref 0–0.5)
IMM GRANULOCYTES NFR BLD: 0.4 % (ref 0–0.5)
INTERNAL CONTROL: NORMAL
KETONES UR QL STRIP: ABNORMAL
LEUKOCYTE ESTERASE UR QL STRIP.AUTO: NEGATIVE
LIPASE SERPL-CCNC: 64 U/L (ref 13–60)
LYMPHOCYTES # BLD AUTO: 1.15 10*3/MM3 (ref 1–3)
LYMPHOCYTES # BLD AUTO: 1.53 10*3/MM3 (ref 1–3)
LYMPHOCYTES NFR BLD AUTO: 13.7 % (ref 21–51)
LYMPHOCYTES NFR BLD AUTO: 19.4 % (ref 21–51)
Lab: NORMAL
MCH RBC QN AUTO: 29.7 PG (ref 27–33)
MCH RBC QN AUTO: 29.9 PG (ref 27–33)
MCHC RBC AUTO-ENTMCNC: 36.6 G/DL (ref 33–37)
MCHC RBC AUTO-ENTMCNC: 36.9 G/DL (ref 33–37)
MCV RBC AUTO: 80.7 FL (ref 80–94)
MCV RBC AUTO: 81.9 FL (ref 80–94)
METHADONE UR QL SCN: NEGATIVE
MONOCYTES # BLD AUTO: 1.03 10*3/MM3 (ref 0.1–0.9)
MONOCYTES # BLD AUTO: 1.06 10*3/MM3 (ref 0.1–0.9)
MONOCYTES NFR BLD AUTO: 12.3 % (ref 0–10)
MONOCYTES NFR BLD AUTO: 13.4 % (ref 0–10)
NEUTROPHILS # BLD AUTO: 5.13 10*3/MM3 (ref 1.4–6.5)
NEUTROPHILS # BLD AUTO: 6.04 10*3/MM3 (ref 1.4–6.5)
NEUTROPHILS NFR BLD AUTO: 64.8 % (ref 30–70)
NEUTROPHILS NFR BLD AUTO: 72.1 % (ref 30–70)
NITRITE UR QL STRIP: NEGATIVE
OPIATES UR QL: NEGATIVE
OSMOLALITY SERPL CALC.SUM OF ELEC: 272 MOSM/KG (ref 273–305)
OSMOLALITY SERPL CALC.SUM OF ELEC: 272.2 MOSM/KG (ref 273–305)
OXYCODONE UR QL SCN: NEGATIVE
PCP UR QL SCN: NEGATIVE
PH UR STRIP.AUTO: 5.5 [PH] (ref 5–8)
PLATELET # BLD AUTO: 317 10*3/MM3 (ref 130–400)
PLATELET # BLD AUTO: 386 10*3/MM3 (ref 130–400)
PMV BLD AUTO: 9.4 FL (ref 6–10)
PMV BLD AUTO: 9.5 FL (ref 6–10)
POTASSIUM BLD-SCNC: 3.7 MMOL/L (ref 3.5–5.3)
POTASSIUM BLD-SCNC: 3.9 MMOL/L (ref 3.5–5.3)
PROT SERPL-MCNC: 7.4 G/DL (ref 6–8)
PROT SERPL-MCNC: 7.4 G/DL (ref 6–8)
PROT UR QL STRIP: NEGATIVE
RBC # BLD AUTO: 5.41 10*6/MM3 (ref 4.7–6.1)
RBC # BLD AUTO: 5.48 10*6/MM3 (ref 4.7–6.1)
SODIUM BLD-SCNC: 126 MMOL/L (ref 135–153)
SODIUM BLD-SCNC: 128 MMOL/L (ref 135–153)
SP GR UR STRIP: >1.03 (ref 1–1.03)
TROPONIN I SERPL-MCNC: <0.006 NG/ML
UROBILINOGEN UR QL STRIP: ABNORMAL
WBC NRBC COR # BLD: 7.9 10*3/MM3 (ref 4.5–12.5)
WBC NRBC COR # BLD: 8.38 10*3/MM3 (ref 4.5–12.5)

## 2018-01-25 PROCEDURE — 80307 DRUG TEST PRSMV CHEM ANLYZR: CPT | Performed by: PHYSICIAN ASSISTANT

## 2018-01-25 PROCEDURE — 76705 ECHO EXAM OF ABDOMEN: CPT | Performed by: RADIOLOGY

## 2018-01-25 PROCEDURE — 74177 CT ABD & PELVIS W/CONTRAST: CPT | Performed by: RADIOLOGY

## 2018-01-25 PROCEDURE — 96374 THER/PROPH/DIAG INJ IV PUSH: CPT

## 2018-01-25 PROCEDURE — 85025 COMPLETE CBC W/AUTO DIFF WBC: CPT | Performed by: PHYSICIAN ASSISTANT

## 2018-01-25 PROCEDURE — 0 IOPAMIDOL 61 % SOLUTION: Performed by: EMERGENCY MEDICINE

## 2018-01-25 PROCEDURE — 81003 URINALYSIS AUTO W/O SCOPE: CPT | Performed by: PHYSICIAN ASSISTANT

## 2018-01-25 PROCEDURE — 83036 HEMOGLOBIN GLYCOSYLATED A1C: CPT | Performed by: NURSE PRACTITIONER

## 2018-01-25 PROCEDURE — 80053 COMPREHEN METABOLIC PANEL: CPT | Performed by: PHYSICIAN ASSISTANT

## 2018-01-25 PROCEDURE — 76705 ECHO EXAM OF ABDOMEN: CPT

## 2018-01-25 PROCEDURE — 25010000002 PROMETHAZINE PER 50 MG: Performed by: PHYSICIAN ASSISTANT

## 2018-01-25 PROCEDURE — 99284 EMERGENCY DEPT VISIT MOD MDM: CPT

## 2018-01-25 PROCEDURE — 93000 ELECTROCARDIOGRAM COMPLETE: CPT | Performed by: NURSE PRACTITIONER

## 2018-01-25 PROCEDURE — 84484 ASSAY OF TROPONIN QUANT: CPT | Performed by: PHYSICIAN ASSISTANT

## 2018-01-25 PROCEDURE — 71045 X-RAY EXAM CHEST 1 VIEW: CPT

## 2018-01-25 PROCEDURE — 83690 ASSAY OF LIPASE: CPT | Performed by: PHYSICIAN ASSISTANT

## 2018-01-25 PROCEDURE — 85025 COMPLETE CBC W/AUTO DIFF WBC: CPT | Performed by: NURSE PRACTITIONER

## 2018-01-25 PROCEDURE — 74177 CT ABD & PELVIS W/CONTRAST: CPT

## 2018-01-25 PROCEDURE — 93005 ELECTROCARDIOGRAM TRACING: CPT | Performed by: PHYSICIAN ASSISTANT

## 2018-01-25 PROCEDURE — 86677 HELICOBACTER PYLORI ANTIBODY: CPT | Performed by: NURSE PRACTITIONER

## 2018-01-25 PROCEDURE — 36415 COLL VENOUS BLD VENIPUNCTURE: CPT

## 2018-01-25 PROCEDURE — 93010 ELECTROCARDIOGRAM REPORT: CPT | Performed by: INTERNAL MEDICINE

## 2018-01-25 PROCEDURE — 87804 INFLUENZA ASSAY W/OPTIC: CPT | Performed by: NURSE PRACTITIONER

## 2018-01-25 PROCEDURE — 99214 OFFICE O/P EST MOD 30 MIN: CPT | Performed by: NURSE PRACTITIONER

## 2018-01-25 PROCEDURE — 71045 X-RAY EXAM CHEST 1 VIEW: CPT | Performed by: RADIOLOGY

## 2018-01-25 PROCEDURE — 80053 COMPREHEN METABOLIC PANEL: CPT | Performed by: NURSE PRACTITIONER

## 2018-01-25 RX ORDER — DICYCLOMINE HCL 20 MG
20 TABLET ORAL EVERY 8 HOURS PRN
Qty: 20 TABLET | Refills: 0 | Status: SHIPPED | OUTPATIENT
Start: 2018-01-25 | End: 2018-03-12

## 2018-01-25 RX ORDER — PROMETHAZINE HYDROCHLORIDE 25 MG/1
25 TABLET ORAL EVERY 6 HOURS PRN
Qty: 30 TABLET | Refills: 0 | Status: SHIPPED | OUTPATIENT
Start: 2018-01-25 | End: 2018-03-12

## 2018-01-25 RX ORDER — PROMETHAZINE HYDROCHLORIDE 25 MG/ML
12.5 INJECTION, SOLUTION INTRAMUSCULAR; INTRAVENOUS ONCE
Status: COMPLETED | OUTPATIENT
Start: 2018-01-25 | End: 2018-01-25

## 2018-01-25 RX ORDER — PROMETHAZINE HYDROCHLORIDE 25 MG/1
25 TABLET ORAL EVERY 6 HOURS PRN
Qty: 20 TABLET | Refills: 0 | Status: SHIPPED | OUTPATIENT
Start: 2018-01-25 | End: 2018-03-12

## 2018-01-25 RX ORDER — CHLORPROMAZINE HYDROCHLORIDE 25 MG/1
25 TABLET, FILM COATED ORAL ONCE
Status: COMPLETED | OUTPATIENT
Start: 2018-01-25 | End: 2018-01-25

## 2018-01-25 RX ORDER — SODIUM CHLORIDE 0.9 % (FLUSH) 0.9 %
10 SYRINGE (ML) INJECTION AS NEEDED
Status: DISCONTINUED | OUTPATIENT
Start: 2018-01-25 | End: 2018-01-25 | Stop reason: HOSPADM

## 2018-01-25 RX ORDER — OMEPRAZOLE 40 MG/1
40 CAPSULE, DELAYED RELEASE ORAL DAILY
Qty: 30 CAPSULE | Refills: 3 | Status: SHIPPED | OUTPATIENT
Start: 2018-01-25 | End: 2018-03-12

## 2018-01-25 RX ORDER — LACTULOSE 10 G/15ML
20 SOLUTION ORAL 2 TIMES DAILY PRN
Qty: 240 ML | Refills: 0 | Status: SHIPPED | OUTPATIENT
Start: 2018-01-25 | End: 2018-03-12

## 2018-01-25 RX ORDER — DICYCLOMINE HYDROCHLORIDE 10 MG/1
10 CAPSULE ORAL
Qty: 40 CAPSULE | Refills: 0 | Status: SHIPPED | OUTPATIENT
Start: 2018-01-25 | End: 2018-01-25

## 2018-01-25 RX ADMIN — SODIUM CHLORIDE 1000 ML: 9 INJECTION, SOLUTION INTRAVENOUS at 17:44

## 2018-01-25 RX ADMIN — IOPAMIDOL 100 ML: 612 INJECTION, SOLUTION INTRAVENOUS at 15:40

## 2018-01-25 RX ADMIN — PROMETHAZINE HYDROCHLORIDE 12.5 MG: 25 INJECTION INTRAMUSCULAR; INTRAVENOUS at 15:21

## 2018-01-25 RX ADMIN — CHLORPROMAZINE HYDROCHLORIDE 25 MG: 25 TABLET, SUGAR COATED ORAL at 17:43

## 2018-01-25 RX ADMIN — SODIUM CHLORIDE 1000 ML: 9 INJECTION, SOLUTION INTRAVENOUS at 16:47

## 2018-01-25 NOTE — ED PROVIDER NOTES
"Subjective   HPI Comments: 41 y/o male that comes in with c/c \"Nausea, vomiting\" X one week. Patient has had intractable non-bloody or non-bilious vomiting. Patient states that he has had upper abdominal pain. Patient states that he was seen at primary care office just prior to arrival.  Patient was found to have abnormal EKG. Patient does have significant history for diabetes, bipolar, HTN, hyperlipidemia.     Patient is a 42 y.o. male presenting with vomiting and abdominal pain.   History provided by:  Patient   used: No    Vomiting   The primary symptoms include abdominal pain, nausea and vomiting. Primary symptoms do not include diarrhea, melena, hematochezia or arthralgias. The illness began 6 to 7 days ago. The problem has not changed since onset.  The illness is also significant for back pain. The illness does not include chills, anorexia, dysphagia, odynophagia, bloating or tenesmus. Associated medical issues do not include inflammatory bowel disease, GERD, gallstones, liver disease, gastric bypass, bowel resection, irritable bowel syndrome or hemorrhoids.   Abdominal Pain   Pain location:  Generalized  Pain quality: cramping and sharp    Pain quality: not aching, not bloating and not dull    Pain radiates to:  Does not radiate  Pain severity:  Moderate  Onset quality:  Sudden  Duration:  2 days  Timing:  Constant  Progression:  Worsening  Chronicity:  New  Context: not alcohol use, not awakening from sleep, not diet changes, not medication withdrawal, not previous surgeries, not retching and not sick contacts    Relieved by:  Nothing  Worsened by:  Nothing  Ineffective treatments:  None tried  Associated symptoms: nausea and vomiting    Associated symptoms: no anorexia, no chills, no diarrhea, no hematochezia and no melena    Risk factors: no alcohol abuse, no aspirin use, has not had multiple surgeries and no NSAID use        Review of Systems   Constitutional: Negative for chills. "   Gastrointestinal: Positive for abdominal pain, nausea and vomiting. Negative for anorexia, bloating, diarrhea, dysphagia, hematochezia and melena.   Musculoskeletal: Positive for back pain. Negative for arthralgias.   All other systems reviewed and are negative.      Past Medical History:   Diagnosis Date   • Anxiety    • Bipolar 1 disorder    • Depression    • Head injury 2016   • Hyperlipidemia    • Hypertension    • Migraine    • Obesity    • Psychiatric illness    • Type 2 diabetes mellitus        Allergies   Allergen Reactions   • Statins        Past Surgical History:   Procedure Laterality Date   • KNEE SURGERY  1992    right   • NASAL POLYP EXCISION  2009   • URETHRAL DILATATION      infant       Family History   Problem Relation Age of Onset   • Hypertension Mother    • Hypertension Father    • Diabetes Maternal Grandfather    • Heart disease Maternal Grandfather        Social History     Social History   • Marital status: Single     Spouse name: N/A   • Number of children: N/A   • Years of education: N/A     Social History Main Topics   • Smoking status: Former Smoker     Quit date: 1/1/2009   • Smokeless tobacco: Never Used   • Alcohol use Yes      Comment: occassional-Once every 6 months   • Drug use: No   • Sexual activity: Not Currently     Other Topics Concern   • None     Social History Narrative           Objective   Physical Exam   Constitutional: He is oriented to person, place, and time. He appears well-developed and well-nourished. No distress.   HENT:   Head: Normocephalic.   Right Ear: External ear normal.   Left Ear: External ear normal.   Mouth/Throat: Oropharynx is clear and moist. No oropharyngeal exudate.   Eyes: Conjunctivae and EOM are normal. Pupils are equal, round, and reactive to light. Right eye exhibits no discharge. Left eye exhibits no discharge. No scleral icterus.   Neck: Normal range of motion. Neck supple. No JVD present. No tracheal deviation present. No thyromegaly  present.   Cardiovascular: Normal rate, regular rhythm, normal heart sounds and intact distal pulses.  Exam reveals no friction rub.    No murmur heard.  Pulmonary/Chest: Effort normal and breath sounds normal. No stridor. No respiratory distress. He has no wheezes. He has no rales.   Abdominal: Soft. Bowel sounds are normal. He exhibits no distension. There is tenderness. There is guarding.   Musculoskeletal: Normal range of motion. He exhibits no edema or deformity.   Neurological: He is alert and oriented to person, place, and time. He has normal reflexes. He displays normal reflexes. No cranial nerve deficit. He exhibits normal muscle tone. Coordination normal.   Skin: Skin is warm and dry. No rash noted. He is not diaphoretic. No erythema. No pallor.   Psychiatric: He has a normal mood and affect. His behavior is normal. Judgment and thought content normal.   Nursing note and vitals reviewed.      Procedures         ED Course  ED Course   Comment By Time   IMPRESSION:   : Thickening of the urinary bladder wall  Moderate to large amount of stool in the colon  Nonobstructing stones in both kidneys James Saba PA-C 01/25 163   IMPRESSION:   Impression: Probable tumefactive sludge. No evidence of cholelithiasis.    This report was finalized on 1/25/2018 3:14 PM by Dr. Aj Newton MD. James Saba PA-C 01/25 0754   This is a 43 y/o male that comes in with intractable vomiting for over past week. Patient did have CT scan, US and chest x-ray. Patient was found to have large amount of stool in abdomen with no other acute findings. Patient advised to follow-up with GI in next 1-2 days. James Saba PA-C 01/25 164                  MDM  Number of Diagnoses or Management Options     Amount and/or Complexity of Data Reviewed  Clinical lab tests: reviewed and ordered  Tests in the radiology section of CPT®: reviewed and ordered  Tests in the medicine section of CPT®: reviewed  Independent  visualization of images, tracings, or specimens: yes    Risk of Complications, Morbidity, and/or Mortality  Presenting problems: moderate  Diagnostic procedures: moderate  Management options: moderate    Patient Progress  Patient progress: stable      Final diagnoses:   Nausea and vomiting in adult   Constipation, unspecified constipation type            James Saba PA-C  01/25/18 1747       James Saba PA-C  01/25/18 1814

## 2018-01-25 NOTE — PROGRESS NOTES
Subjective   Thomas Mireles is a 42 y.o. male.   Chief Compliant: The patient presents with Weight Loss; Nausea; and Medication Problem (hiccups from Zofran (indicated side effect))    History of Present Illness Presents today with a 7- 8 day of nausea and vomiting.  Initially  Nauseated for two days and felt related to new start of Zoloft as he has take multiple psych meds in the past he knew he needed to continue and this would pass.  On the third days began to vomit forcefully and unable to keep and food or liquids down.  3 days ago started Zofran witch helped slow down the nausea and vomiting.  Now has developed intense spasms mid epigastric and constant hiccups to the point he is unable to sleep.  Spasms intense knot top of stomach lasting several minutes at a time without any chest pain, lightheadedness, or shortness of breath.  Denies any diarrhea.  No one else in the home with any illness.  Patient keeps to himself and has not worked in some time with his psychiatric illness. Jas states when checking his sugar three days ago his numbers were low 90 so he didn't worry as he hadn't eaten.     The following portions of the patient's history were reviewed and updated as appropriate: allergies, current medications, past family history, past medical history, past social history, past surgical history and problem list.      Review of Systems   Constitutional: Positive for activity change, appetite change and fatigue. Negative for chills, diaphoresis and fever.   HENT: Negative.    Respiratory: Negative.    Cardiovascular: Negative.    Gastrointestinal: Positive for abdominal pain (epigastric spasms), nausea and vomiting. Negative for abdominal distention, anal bleeding, blood in stool, constipation, diarrhea and rectal pain.   Musculoskeletal: Negative.    Skin: Negative.    All other systems reviewed and are negative.        ECG 12 Lead  Date/Time: 1/25/2018 1:26 PM  Performed by: JOSHUA LAWRENCE  Authorized by: HOWARD  "JOSHUA   Comparison: not compared with previous ECG   Previous ECG: no previous ECG available  Rhythm: sinus tachycardia  Rate: tachycardic  Conduction: LAFB  ST Elevation: all  Clinical impression: abnormal ECG            Vitals: Blood pressure 150/88, pulse 120, temperature 98.2 °F (36.8 °C), temperature source Oral, height 177.8 cm (70\"), weight 99.7 kg (219 lb 12.8 oz), SpO2 97 %.     Allergies:   Allergies   Allergen Reactions   • Statins           Objective   Physical Exam   Constitutional: He is oriented to person, place, and time. He appears well-developed and well-nourished. No distress.   HENT:   Head: Normocephalic.   Right Ear: External ear normal.   Left Ear: External ear normal.   Nose: Nose normal.   Mouth/Throat: Oropharynx is clear and moist. No oropharyngeal exudate.   Eyes: Conjunctivae are normal. Right eye exhibits no discharge. Left eye exhibits no discharge.   Neck: Neck supple. No thyromegaly present.   Cardiovascular: Regular rhythm and normal heart sounds.  Tachycardia present.    No murmur heard.  Pulmonary/Chest: Effort normal and breath sounds normal. No respiratory distress.   Abdominal: Soft. Bowel sounds are normal. He exhibits no distension. Tenderness: epigastric tenderness  There is guarding.   Neurological: He is alert and oriented to person, place, and time.   Skin: Skin is warm and dry. He is not diaphoretic.   Psychiatric: He has a normal mood and affect. His behavior is normal. Judgment and thought content normal.   Mother present with patient      Nursing note and vitals reviewed.      Assessment/Plan   Discussed with patient impression and plan, patient verbalizes understanding  Initially felt patient diagnosis viral illness with esophogheal spasms.  Discussed present in symptoms with Dr Richards and discussed serotonin reaction with the new addition of Zoloft.  EKG performed and showed abnormality with tachycardia and ST elevation.  No comparison EKG.  Initially had arranged " for labs and IVF however after discussion with patient and further workup warranted proceed to the ED for further work up.     Thomas was seen today for weight loss, nausea and medication problem.    Diagnoses and all orders for this visit:    Nausea and vomiting, intractability of vomiting not specified, unspecified vomiting type  -     CBC & Differential  -     Comprehensive Metabolic Panel  -     Hemoglobin A1c  -     H.pylori,IgG / IgA Antibodies; Future  -     Cancel: POC Urinalysis Dipstick, Automated  -     H.pylori,IgG / IgA Antibodies  -     CBC Auto Differential  -     Osmolality, Calculated; Future  -     Osmolality, Calculated  -     POC Influenza A / B    Esophageal spasm  -     ECG 12 Lead    Hiccups    Abnormal EKG    Other orders  -     Discontinue: dicyclomine (BENTYL) 10 MG capsule; Take 1 capsule by mouth 4 (Four) Times a Day Before Meals & at Bedtime.  -     omeprazole (priLOSEC) 40 MG capsule; Take 1 capsule by mouth Daily.  -     promethazine (PHENERGAN) 25 MG tablet; Take 1 tablet by mouth Every 6 (Six) Hours As Needed for Nausea or Vomiting.

## 2018-01-26 LAB
H PYLORI IGA SER IA-ACNC: <9 UNITS (ref 0–8.9)
H PYLORI IGG SER IA-ACNC: 0.11 INDEX VALUE (ref 0–0.79)

## 2018-01-29 ENCOUNTER — OFFICE VISIT (OUTPATIENT)
Dept: CARDIOLOGY | Facility: CLINIC | Age: 43
End: 2018-01-29

## 2018-01-29 VITALS
HEART RATE: 104 BPM | WEIGHT: 230 LBS | HEIGHT: 70 IN | DIASTOLIC BLOOD PRESSURE: 77 MMHG | SYSTOLIC BLOOD PRESSURE: 129 MMHG | BODY MASS INDEX: 32.93 KG/M2

## 2018-01-29 DIAGNOSIS — E78.2 MIXED HYPERLIPIDEMIA: ICD-10-CM

## 2018-01-29 DIAGNOSIS — I10 ESSENTIAL HYPERTENSION: ICD-10-CM

## 2018-01-29 DIAGNOSIS — R94.31 ABNORMAL EKG: Primary | ICD-10-CM

## 2018-01-29 DIAGNOSIS — I25.2 OLD MYOCARDIAL INFARCTION: ICD-10-CM

## 2018-01-29 DIAGNOSIS — R06.09 DYSPNEA ON EXERTION: ICD-10-CM

## 2018-01-29 PROCEDURE — 99204 OFFICE O/P NEW MOD 45 MIN: CPT | Performed by: INTERNAL MEDICINE

## 2018-01-29 NOTE — PROGRESS NOTES
LAUREN Majano  Thomas Mireles  1975 01/29/2018    Patient Active Problem List   Diagnosis   • Hypertension   • Hyperlipidemia   • Type 2 diabetes mellitus with hyperglycemia   • Bipolar II disorder   • Rosacea   • Abnormal EKG   • Old myocardial infarction by EKG criteria.   • Dyspnea on exertion (class 2-3)       Dear LAUREN Majano:    Subjective     Thomas Mireles is a 42 y.o. male with the problems as listed above, presents    Chief complaint: Abnormal EKG and shortness of breath.      History of Present Illness: Ms. Mireles is a pleasant 39-year-old  male with no history of known coronary artery disease or structural heart disease, this with complaints of having had history of nausea and vomiting after he was started on Zoloft recently for apparently a bout of depression.  It apparently has a history of bipolar disorder and has been on medications for same.  He was initially prescribed Zofran for his nausea.  He started to have some hiccups earlier that really got worse with Zofran.  Zofran and Zoloft haveSince been discontinued..  He has also noticed increasing shortness of breath for the last couple of weeks although he lost about 20 pounds in the last week or so.  He denies any PND, orthopnea or pedal edema.  He denies any chest pain or discomfort.  His EKG from 1/25/2017 reveals nonspecific ST-T wave changes in leads 2, 3 and aVF as well as poor R wave progression across leads V1 through V5.  This seems to be changed from previous EKGs in November 2017.      He denies any recent flulike illnesses such as fever or chills or bodyaches. He  reportedly had a flu swab at Kaycee Aleman's office recently which apparently was negative.    Cardiac risk factors:diabetes mellitus, hypertension and Positive family Hx. of premature athersclerotivc disease.    Allergies   Allergen Reactions   • Statins    :      Current Outpatient Prescriptions:   •  butalbital-aspirin-caffeine (FIORINAL) -40 MG per capsule,  Take 1 capsule by mouth 2 (Two) Times a Day As Needed for Headache. (Patient not taking: Reported on 1/25/2018), Disp: 40 capsule, Rfl: 0  •  cyclobenzaprine (FLEXERIL) 5 MG tablet, Take 1 tablet by mouth At Night As Needed for Muscle Spasms., Disp: 90 tablet, Rfl: 3  •  dicyclomine (BENTYL) 20 MG tablet, Take 1 tablet by mouth Every 8 (Eight) Hours As Needed (abdominal pain)., Disp: 20 tablet, Rfl: 0  •  insulin degludec (TRESIBA FLEXTOUCH) 100 UNIT/ML solution pen-injector injection, Inject 40 Units under the skin Daily., Disp: 9 mL, Rfl: 3  •  lactulose (CHRONULAC) 10 GM/15ML solution, Take 30 mL by mouth 2 (Two) Times a Day As Needed (abdominal pain)., Disp: 240 mL, Rfl: 0  •  lisinopril (PRINIVIL,ZESTRIL) 5 MG tablet, Take 1 tablet by mouth Daily., Disp: 90 tablet, Rfl: 3  •  metoprolol tartrate (LOPRESSOR) 25 MG tablet, Take 1 tablet by mouth Every 12 (Twelve) Hours., Disp: 60 tablet, Rfl: 1  •  naproxen (NAPROSYN) 500 MG tablet, Take 1 tablet by mouth 2 (Two) Times a Day With Meals., Disp: 180 tablet, Rfl: 3  •  NUEDEXTA 20-10 MG capsule capsule, Take 1 capsule by mouth 2 (Two) Times a Day., Disp: , Rfl: 0  •  omeprazole (priLOSEC) 40 MG capsule, Take 1 capsule by mouth Daily., Disp: 30 capsule, Rfl: 3  •  promethazine (PHENERGAN) 25 MG tablet, Take 1 tablet by mouth Every 6 (Six) Hours As Needed for Nausea or Vomiting., Disp: 30 tablet, Rfl: 0  •  promethazine (PHENERGAN) 25 MG tablet, Take 1 tablet by mouth Every 6 (Six) Hours As Needed for Nausea or Vomiting., Disp: 20 tablet, Rfl: 0  •  QUEtiapine (SEROquel) 25 MG tablet, Take 1 tablet by mouth Every Night., Disp: 90 tablet, Rfl: 0  •  topiramate (TOPAMAX) 100 MG tablet, Take 1 tablet by mouth 2 (Two) Times a Day., Disp: 180 tablet, Rfl: 3    Past Medical History:   Diagnosis Date   • Anxiety    • Bipolar 1 disorder    • Depression    • Head injury 2016   • Hyperlipidemia    • Hypertension    • Migraine    • Obesity    • Psychiatric illness    • Type 2  "diabetes mellitus      Past Surgical History:   Procedure Laterality Date   • KNEE SURGERY  1992    right   • NASAL POLYP EXCISION  2009   • URETHRAL DILATATION      infant     Family History   Problem Relation Age of Onset   • Hypertension Mother    • Hypertension Father    • Diabetes Maternal Grandfather    • Heart disease Maternal Grandfather      Social History   Substance Use Topics   • Smoking status: Former Smoker     Quit date: 1/1/2009   • Smokeless tobacco: Never Used   • Alcohol use Yes      Comment: occassional-Once every 6 months       Review of Systems   Constitution: Negative for chills, fever, weakness, malaise/fatigue, weight gain and weight loss.   HENT: Negative for congestion and nosebleeds.    Cardiovascular: Negative for chest pain, irregular heartbeat, leg swelling and orthopnea.   Respiratory: Positive for shortness of breath. Negative for cough and hemoptysis.    Gastrointestinal: Positive for nausea and vomiting. Negative for abdominal pain, change in bowel habit, hematemesis and melena.   Genitourinary: Negative for dysuria, frequency and hematuria.   Neurological: Negative for focal weakness, headaches and light-headedness.   Psychiatric/Behavioral: Positive for depression.       Objective   Blood pressure 129/77, pulse 104, height 177.8 cm (70\"), weight 104 kg (230 lb).  Body mass index is 33 kg/(m^2).        Physical Exam   Constitutional: He is oriented to person, place, and time. He appears well-developed and well-nourished.   HENT:   Head: Normocephalic.   Eyes: Conjunctivae and EOM are normal.   Neck: Normal range of motion. Neck supple. No JVD present. No tracheal deviation present. No thyromegaly present.   Cardiovascular: Normal rate, regular rhythm, S1 normal and S2 normal.  Exam reveals no gallop, no S4 and no friction rub.    No murmur heard.  Pulses:       Carotid pulses are 2+ on the right side, and 2+ on the left side.       Radial pulses are 2+ on the right side, and 2+ on " the left side.        Femoral pulses are 2+ on the right side, and 2+ on the left side.       Popliteal pulses are 2+ on the right side, and 2+ on the left side.        Dorsalis pedis pulses are 2+ on the right side, and 2+ on the left side.        Posterior tibial pulses are 2+ on the right side, and 2+ on the left side.   Pulmonary/Chest: Breath sounds normal. No respiratory distress. He has no wheezes. He has no rales.   Abdominal: Soft. Bowel sounds are normal. He exhibits no mass. There is no tenderness.   Musculoskeletal: He exhibits no edema.   Neurological: He is alert and oriented to person, place, and time. No cranial nerve deficit.   Skin: Skin is warm and dry.   Psychiatric: He has a normal mood and affect.       Lab Results   Component Value Date     (L) 01/25/2018    K 3.9 01/25/2018    CL 89 (L) 01/25/2018    CO2 27.3 01/25/2018    BUN 14 01/25/2018    CREATININE 0.84 01/25/2018    GLUCOSE 359 (H) 01/25/2018    CALCIUM 9.5 01/25/2018    AST 18 01/25/2018    ALT 18 01/25/2018    ALKPHOS 105 01/25/2018    LABIL2 1.5 01/25/2018     No results found for: CKTOTAL  Lab Results   Component Value Date    WBC 7.90 01/25/2018    HGB 16.2 01/25/2018    HCT 44.3 01/25/2018     01/25/2018     No results found for: INR  No results found for: MG  Lab Results   Component Value Date    TSH 0.793 09/12/2017    CHLPL 243 (H) 09/14/2016    TRIG 376 (H) 11/03/2017    HDL 31 (L) 11/03/2017    LDL Comment 09/14/2016      No results found for: BNP    Procedures    Assessment/Plan :  Diagnoses and all orders for this visit:    1. Abnormal EKG.  2. Old myocardial infarction by EKG criteria.  3. Dyspnea on exertion (class 2-3).  4. Essential hypertension.  5. Mixed hyperlipidemia.     Recommendations:    1. We'll add low-dose aspirin 81 mg daily and metoprolol 25 mg by mouth twice a day.  2. Evaluate further with a stress echo Doppler study.  3. Follow-up in 2-3 weeks.    Return in about 2 weeks (around  2/12/2018).    As always, I appreciate very much the opportunity to participate in the cardiovascular care of your patients.      With Best Regards,    Ben Rapp MD, FACC

## 2018-01-31 ENCOUNTER — OFFICE VISIT (OUTPATIENT)
Dept: UROLOGY | Facility: CLINIC | Age: 43
End: 2018-01-31

## 2018-01-31 DIAGNOSIS — N20.0 RENAL STONES: ICD-10-CM

## 2018-01-31 DIAGNOSIS — K59.00 CONSTIPATION, UNSPECIFIED CONSTIPATION TYPE: ICD-10-CM

## 2018-01-31 DIAGNOSIS — R10.13 EPIGASTRIC PAIN: Primary | ICD-10-CM

## 2018-01-31 PROCEDURE — 99215 OFFICE O/P EST HI 40 MIN: CPT | Performed by: NURSE PRACTITIONER

## 2018-01-31 NOTE — PROGRESS NOTES
Chief Complaint:          Chief Complaint   Patient presents with   • Nephrolithiasis       HPI:   42 y.o. male being seen in the office today for complaint of epigastric pain with intractable vomiting for over for 1 week for which he was seen at [facility] ED on 01/25/2017. He did have a CT scan completed while at the ED which did reveal bilateral renal stones and nonobstructing position and a large amount of stool throughout the colon indicative constipation. Patient is a type II diabetic. He is being seen with his mother today. He further has a history of bipolar disorder. He denies any difficulty with urination but does complain of bowel issues. He has never been evaluated by a gastroenterologist as to the cause of his intractable vomiting, epigastric pain or his constipation.    HPI        Past Medical History:        Past Medical History:   Diagnosis Date   • Anxiety    • Bipolar 1 disorder    • Depression    • Head injury 2016   • Hyperlipidemia    • Hypertension    • Migraine    • Obesity    • Psychiatric illness    • Type 2 diabetes mellitus          Current Meds:     Current Outpatient Prescriptions   Medication Sig Dispense Refill   • butalbital-aspirin-caffeine (FIORINAL) -40 MG per capsule Take 1 capsule by mouth 2 (Two) Times a Day As Needed for Headache. 40 capsule 0   • cyclobenzaprine (FLEXERIL) 5 MG tablet Take 1 tablet by mouth At Night As Needed for Muscle Spasms. 90 tablet 3   • dicyclomine (BENTYL) 20 MG tablet Take 1 tablet by mouth Every 8 (Eight) Hours As Needed (abdominal pain). 20 tablet 0   • insulin degludec (TRESIBA FLEXTOUCH) 100 UNIT/ML solution pen-injector injection Inject 40 Units under the skin Daily. 9 mL 3   • lactulose (CHRONULAC) 10 GM/15ML solution Take 30 mL by mouth 2 (Two) Times a Day As Needed (abdominal pain). 240 mL 0   • lisinopril (PRINIVIL,ZESTRIL) 5 MG tablet Take 1 tablet by mouth Daily. 90 tablet 3   • metoprolol tartrate (LOPRESSOR) 25 MG tablet Take 1  tablet by mouth Every 12 (Twelve) Hours. 60 tablet 1   • naproxen (NAPROSYN) 500 MG tablet Take 1 tablet by mouth 2 (Two) Times a Day With Meals. 180 tablet 3   • NUEDEXTA 20-10 MG capsule capsule Take 1 capsule by mouth 2 (Two) Times a Day.  0   • omeprazole (priLOSEC) 40 MG capsule Take 1 capsule by mouth Daily. 30 capsule 3   • promethazine (PHENERGAN) 25 MG tablet Take 1 tablet by mouth Every 6 (Six) Hours As Needed for Nausea or Vomiting. 30 tablet 0   • promethazine (PHENERGAN) 25 MG tablet Take 1 tablet by mouth Every 6 (Six) Hours As Needed for Nausea or Vomiting. 20 tablet 0   • QUEtiapine (SEROquel) 25 MG tablet Take 1 tablet by mouth Every Night. 90 tablet 0   • topiramate (TOPAMAX) 100 MG tablet Take 1 tablet by mouth 2 (Two) Times a Day. 180 tablet 3     No current facility-administered medications for this visit.         Allergies:      Allergies   Allergen Reactions   • Statins         Past Surgical History:     Past Surgical History:   Procedure Laterality Date   • KNEE SURGERY  1992    right   • NASAL POLYP EXCISION  2009   • URETHRAL DILATATION      infant         Social History:     Social History     Social History   • Marital status: Single     Spouse name: N/A   • Number of children: N/A   • Years of education: N/A     Occupational History   • Not on file.     Social History Main Topics   • Smoking status: Former Smoker     Quit date: 1/1/2009   • Smokeless tobacco: Never Used   • Alcohol use Yes      Comment: occassional-Once every 6 months   • Drug use: No   • Sexual activity: Not Currently     Other Topics Concern   • Not on file     Social History Narrative       Family History:     Family History   Problem Relation Age of Onset   • Hypertension Mother    • Hypertension Father    • Diabetes Maternal Grandfather    • Heart disease Maternal Grandfather        Review of Systems:     Review of Systems   Constitutional: Negative for chills, fatigue and fever.   Respiratory: Negative for cough,  shortness of breath and wheezing.    Cardiovascular: Negative for leg swelling.   Gastrointestinal: Positive for constipation, nausea and vomiting. Negative for abdominal pain.   Musculoskeletal: Negative for back pain and joint swelling.   Neurological: Negative for dizziness and headaches.   Psychiatric/Behavioral: Negative for behavioral problems.       I have reviewed the following portions of the patient's history: allergies, current medications, past family history, past medical history, past social history, past surgical history, problem list and ROS and confirm it's accurate.    Physical Exam:     Physical Exam   Constitutional: He is oriented to person, place, and time. He appears well-developed and well-nourished. No distress.   Abdominal: Soft. Bowel sounds are normal. He exhibits no distension and no mass. There is no tenderness. There is no rebound and no guarding. No hernia.   Musculoskeletal: Normal range of motion.   Neurological: He is alert and oriented to person, place, and time.   Skin: Skin is warm and dry. No rash noted. He is not diaphoretic. No pallor.   Psychiatric: He has a normal mood and affect. His behavior is normal. Judgment and thought content normal.   Nursing note and vitals reviewed.      Procedure:     No notes on file      Assessment:     Encounter Diagnoses   Name Primary?   • Epigastric pain Yes   • Constipation, unspecified constipation type    • Renal stones      Orders Placed This Encounter   Procedures   • Ambulatory Referral to Gastroenterology     Referral Priority:   Routine     Referral Type:   Consultation     Referral Reason:   Specialty Services Required     Referred to Provider:   Scar Ayers III, MD     Requested Specialty:   Gastroenterology     Number of Visits Requested:   1       Plan:   Reviewed the CT scan that was completed at [facility] ED on 01/25/2017 that did reveal the bilateral nonobstructing stones with the largest being in the right  kidney measuring approximately 9 mm along with the large amount of stool in the bowel conducive to constipation. Informed patient the stone could be treated by ESWAL which would be an elective surgery since this was not what was causing him to have nausea and vomiting. Did discuss with him how constipation can cause these symptoms of epigastric pain along with decreased appetite and the need to be evaluated by a gastroenterologist for further evaluation as to the cause of his bowel problems. Patient is agreeable to the referral. Patient states since he is asymptomatic with stone at the present time he will wait until he has his other issues treated prior to considering stone treatment. Patient will be given a referral to Dr. Ayers for his epigastric and constipation issues. He is to return to the office on an as-needed basis.    Counseling was given to patient and family for the following topics diagnostic results including: CT scan that was completed at [facility] on 01/25/2017 that did reveal bilateral stones and a nonobstructing position and large amount of stool within the colon and patient and family education including: As mentioned above in the planned section. The interim medical history and current results were reviewed.  A treatment plan with follow-up was made for Epigastric pain [R10.13]. I spent 42 minutes face to face with Thomas Mireles and 35 was spent in counseling.     This document has been electronically signed by LAUREN Alvarado January 31, 2018 2:54 PM

## 2018-02-21 ENCOUNTER — HOSPITAL ENCOUNTER (OUTPATIENT)
Dept: CARDIOLOGY | Facility: HOSPITAL | Age: 43
Discharge: HOME | End: 2018-02-21
Attending: INTERNAL MEDICINE | Admitting: INTERNAL MEDICINE

## 2018-02-21 DIAGNOSIS — I25.2 OLD MYOCARDIAL INFARCTION: ICD-10-CM

## 2018-02-21 DIAGNOSIS — R94.31 ABNORMAL EKG: ICD-10-CM

## 2018-02-21 PROCEDURE — 93018 CV STRESS TEST I&R ONLY: CPT | Performed by: INTERNAL MEDICINE

## 2018-02-21 PROCEDURE — 93325 DOPPLER ECHO COLOR FLOW MAPG: CPT | Performed by: INTERNAL MEDICINE

## 2018-02-21 PROCEDURE — 93320 DOPPLER ECHO COMPLETE: CPT | Performed by: INTERNAL MEDICINE

## 2018-02-21 PROCEDURE — 93320 DOPPLER ECHO COMPLETE: CPT

## 2018-02-21 PROCEDURE — 93325 DOPPLER ECHO COLOR FLOW MAPG: CPT

## 2018-02-21 PROCEDURE — 93351 STRESS TTE COMPLETE: CPT

## 2018-02-21 PROCEDURE — 93350 STRESS TTE ONLY: CPT | Performed by: INTERNAL MEDICINE

## 2018-02-22 LAB
BH CV ECHO MEAS - % IVS THICK: 53.9 %
BH CV ECHO MEAS - % LVPW THICK: 71.5 %
BH CV ECHO MEAS - ACS: 2.6 CM
BH CV ECHO MEAS - AO MAX PG: 5.9 MMHG
BH CV ECHO MEAS - AO MEAN PG: 3.3 MMHG
BH CV ECHO MEAS - AO ROOT AREA (BSA CORRECTED): 1.6
BH CV ECHO MEAS - AO ROOT AREA: 9.7 CM^2
BH CV ECHO MEAS - AO ROOT DIAM: 3.5 CM
BH CV ECHO MEAS - AO V2 MAX: 121.2 CM/SEC
BH CV ECHO MEAS - AO V2 MEAN: 85.8 CM/SEC
BH CV ECHO MEAS - AO V2 VTI: 24.5 CM
BH CV ECHO MEAS - BSA(HAYCOCK): 2.3 M^2
BH CV ECHO MEAS - BSA: 2.2 M^2
BH CV ECHO MEAS - BZI_BMI: 33 KILOGRAMS/M^2
BH CV ECHO MEAS - BZI_METRIC_HEIGHT: 177.8 CM
BH CV ECHO MEAS - BZI_METRIC_WEIGHT: 104.3 KG
BH CV ECHO MEAS - CONTRAST EF 4CH: 60 ML/M^2
BH CV ECHO MEAS - EDV(CUBED): 104.9 ML
BH CV ECHO MEAS - EDV(MOD-SP4): 85 ML
BH CV ECHO MEAS - EDV(TEICH): 103.2 ML
BH CV ECHO MEAS - EF(CUBED): 79.4 %
BH CV ECHO MEAS - EF(TEICH): 71.7 %
BH CV ECHO MEAS - ESV(CUBED): 21.6 ML
BH CV ECHO MEAS - ESV(MOD-SP4): 34 ML
BH CV ECHO MEAS - ESV(TEICH): 29.2 ML
BH CV ECHO MEAS - FS: 40.9 %
BH CV ECHO MEAS - IVS/LVPW: 1.1
BH CV ECHO MEAS - IVSD: 1.3 CM
BH CV ECHO MEAS - IVSS: 1.9 CM
BH CV ECHO MEAS - LA DIMENSION: 4.1 CM
BH CV ECHO MEAS - LA/AO: 1.2
BH CV ECHO MEAS - LV DIASTOLIC VOL/BSA (35-75): 38.4 ML/M^2
BH CV ECHO MEAS - LV MASS(C)D: 208.5 GRAMS
BH CV ECHO MEAS - LV MASS(C)DI: 94.1 GRAMS/M^2
BH CV ECHO MEAS - LV MASS(C)S: 223.7 GRAMS
BH CV ECHO MEAS - LV MASS(C)SI: 101 GRAMS/M^2
BH CV ECHO MEAS - LV SYSTOLIC VOL/BSA (12-30): 15.3 ML/M^2
BH CV ECHO MEAS - LVIDD: 4.7 CM
BH CV ECHO MEAS - LVIDS: 2.8 CM
BH CV ECHO MEAS - LVLD AP4: 9.1 CM
BH CV ECHO MEAS - LVLS AP4: 7.9 CM
BH CV ECHO MEAS - LVOT AREA (M): 3.1 CM^2
BH CV ECHO MEAS - LVOT AREA: 3.3 CM^2
BH CV ECHO MEAS - LVOT DIAM: 2 CM
BH CV ECHO MEAS - LVPWD: 1.1 CM
BH CV ECHO MEAS - LVPWS: 1.9 CM
BH CV ECHO MEAS - MV A MAX VEL: 61.7 CM/SEC
BH CV ECHO MEAS - MV E MAX VEL: 63.7 CM/SEC
BH CV ECHO MEAS - MV E/A: 1
BH CV ECHO MEAS - PA ACC SLOPE: 2685 CM/SEC^2
BH CV ECHO MEAS - PA ACC TIME: 0.04 SEC
BH CV ECHO MEAS - PA PR(ACCEL): 60 MMHG
BH CV ECHO MEAS - RVDD: 3.1 CM
BH CV ECHO MEAS - SI(AO): 106.9 ML/M^2
BH CV ECHO MEAS - SI(CUBED): 37.6 ML/M^2
BH CV ECHO MEAS - SI(MOD-SP4): 23 ML/M^2
BH CV ECHO MEAS - SI(TEICH): 33.4 ML/M^2
BH CV ECHO MEAS - SV(AO): 236.7 ML
BH CV ECHO MEAS - SV(CUBED): 83.3 ML
BH CV ECHO MEAS - SV(MOD-SP4): 51 ML
BH CV ECHO MEAS - SV(TEICH): 74 ML
BH CV STRESS BP STAGE 1: NORMAL
BH CV STRESS BP STAGE 2: NORMAL
BH CV STRESS BP STAGE 3: NORMAL
BH CV STRESS DURATION MIN STAGE 1: 3
BH CV STRESS DURATION MIN STAGE 2: 3
BH CV STRESS DURATION MIN STAGE 3: 3
BH CV STRESS DURATION SEC STAGE 1: 0
BH CV STRESS DURATION SEC STAGE 2: 0
BH CV STRESS DURATION SEC STAGE 3: 0
BH CV STRESS ECHO POST STRESS EJECTION FRACTION EF: 80 %
BH CV STRESS GRADE STAGE 1: 10
BH CV STRESS GRADE STAGE 2: 12
BH CV STRESS GRADE STAGE 3: 14
BH CV STRESS HR STAGE 1: 121
BH CV STRESS HR STAGE 2: 140
BH CV STRESS HR STAGE 3: 160
BH CV STRESS METS STAGE 1: 5
BH CV STRESS METS STAGE 2: 7.5
BH CV STRESS METS STAGE 3: 10
BH CV STRESS PROTOCOL 1: NORMAL
BH CV STRESS RECOVERY BP: NORMAL MMHG
BH CV STRESS RECOVERY HR: 106 BPM
BH CV STRESS SPEED STAGE 1: 1.7
BH CV STRESS SPEED STAGE 2: 2.5
BH CV STRESS SPEED STAGE 3: 3.4
BH CV STRESS STAGE 1: 1
BH CV STRESS STAGE 2: 2
BH CV STRESS STAGE 3: 3
MAXIMAL PREDICTED HEART RATE: 177 BPM
PERCENT MAX PREDICTED HR: 90.4 %
STRESS BASELINE BP: NORMAL MMHG
STRESS BASELINE HR: 106 BPM
STRESS PERCENT HR: 106 %
STRESS POST ESTIMATED WORKLOAD: 10.1 METS
STRESS POST EXERCISE DUR MIN: 7 MIN
STRESS POST EXERCISE DUR SEC: 30 SEC
STRESS POST PEAK BP: NORMAL MMHG
STRESS POST PEAK HR: 160 BPM
STRESS TARGET HR: 150 BPM

## 2018-03-07 ENCOUNTER — TELEPHONE (OUTPATIENT)
Dept: FAMILY MEDICINE CLINIC | Facility: CLINIC | Age: 43
End: 2018-03-07

## 2018-03-12 ENCOUNTER — CONSULT (OUTPATIENT)
Dept: GASTROENTEROLOGY | Facility: CLINIC | Age: 43
End: 2018-03-12

## 2018-03-12 VITALS
DIASTOLIC BLOOD PRESSURE: 86 MMHG | WEIGHT: 236.6 LBS | HEART RATE: 96 BPM | BODY MASS INDEX: 33.87 KG/M2 | SYSTOLIC BLOOD PRESSURE: 135 MMHG | HEIGHT: 70 IN | OXYGEN SATURATION: 98 %

## 2018-03-12 DIAGNOSIS — A04.8 BACTERIAL INFECTION DUE TO HELICOBACTER PYLORI: Primary | ICD-10-CM

## 2018-03-12 DIAGNOSIS — K59.00 CONSTIPATION, UNSPECIFIED CONSTIPATION TYPE: ICD-10-CM

## 2018-03-12 PROCEDURE — 99244 OFF/OP CNSLTJ NEW/EST MOD 40: CPT | Performed by: INTERNAL MEDICINE

## 2018-03-12 RX ORDER — DOXYCYCLINE 100 MG/1
100 CAPSULE ORAL 2 TIMES DAILY
Qty: 28 CAPSULE | Refills: 0 | Status: SHIPPED | OUTPATIENT
Start: 2018-03-12 | End: 2018-03-26

## 2018-03-12 RX ORDER — PANTOPRAZOLE SODIUM 40 MG/1
TABLET, DELAYED RELEASE ORAL
Qty: 28 TABLET | Refills: 0 | Status: SHIPPED | OUTPATIENT
Start: 2018-03-12 | End: 2018-06-26

## 2018-03-12 RX ORDER — METRONIDAZOLE 500 MG/1
500 TABLET ORAL 3 TIMES DAILY
Qty: 42 TABLET | Refills: 0 | Status: SHIPPED | OUTPATIENT
Start: 2018-03-12 | End: 2018-03-26

## 2018-03-12 NOTE — PROGRESS NOTES
Chief Complaint   Patient presents with   • Constipation     Thomas Mireles is a 42 y.o. male who presents to the office today at the request of LAUREN Majano for Constipation.    HPI  42-year-old white male presented to the ER on January 25.  His workup at that time included lab studies, CT abdomen/pelvis and ultrasound gallbladder.  H. pylori antibody was positive. GB ultrasound suggested sludge in the gallbladder.  CT as suggested moderate-large amount of stool in the colon.  The patient was advised to see a gastroenterologist for follow-up.  He currently denies abdominal or GI tract symptoms other than the occasional constipation.  Says that he typically has a BM once every 2-3 days.  Denies overt rectal bleeding.  Denies abdominal pain or UGI tract symptoms.  He has not previously been treated for H. pylori infection.      Review of Systems   Constitutional: Negative.    HENT: Negative.    Eyes: Negative.    Respiratory: Negative.    Cardiovascular: Negative.    Gastrointestinal: Positive for abdominal pain, constipation, diarrhea, nausea and vomiting.   Endocrine: Negative.    Genitourinary: Negative.    Musculoskeletal: Negative.    Skin: Negative.    Allergic/Immunologic: Negative.    Neurological: Negative.    Hematological: Negative.    Psychiatric/Behavioral: Negative.        ACTIVE PROBLEMS:   Specialty Problems     None          PAST MEDICAL HISTORY:  Past Medical History:   Diagnosis Date   • Anxiety    • Bipolar 1 disorder    • Depression    • Head injury 2016   • Hyperlipidemia    • Hypertension    • Migraine    • Obesity    • Psychiatric illness    • Type 2 diabetes mellitus        SURGICAL HISTORY:  Past Surgical History:   Procedure Laterality Date   • KNEE SURGERY  1992    right   • NASAL POLYP EXCISION  2009   • URETHRAL DILATATION      infant       FAMILY HISTORY:  Family History   Problem Relation Age of Onset   • Hypertension Mother    • Hypertension Father    • Diabetes Maternal Grandfather   "  • Heart disease Maternal Grandfather    • Liver cancer Paternal Grandfather        SOCIAL HISTORY:  Social History   Substance Use Topics   • Smoking status: Former Smoker     Quit date: 1/1/2009   • Smokeless tobacco: Never Used   • Alcohol use Yes      Comment: occassional-Once every 6 months       CURRENT MEDICATION:    Current Outpatient Prescriptions:   •  butalbital-aspirin-caffeine (FIORINAL) -40 MG per capsule, Take 1 capsule by mouth 2 (Two) Times a Day As Needed for Headache., Disp: 40 capsule, Rfl: 0  •  cyclobenzaprine (FLEXERIL) 5 MG tablet, Take 1 tablet by mouth At Night As Needed for Muscle Spasms., Disp: 90 tablet, Rfl: 3  •  insulin degludec (TRESIBA FLEXTOUCH) 100 UNIT/ML solution pen-injector injection, Inject 40 Units under the skin Daily., Disp: 9 mL, Rfl: 3  •  lisinopril (PRINIVIL,ZESTRIL) 5 MG tablet, Take 1 tablet by mouth Daily., Disp: 90 tablet, Rfl: 3  •  metoprolol tartrate (LOPRESSOR) 25 MG tablet, Take 1 tablet by mouth Every 12 (Twelve) Hours., Disp: 60 tablet, Rfl: 1  •  naproxen (NAPROSYN) 500 MG tablet, Take 1 tablet by mouth 2 (Two) Times a Day With Meals., Disp: 180 tablet, Rfl: 3  •  NUEDEXTA 20-10 MG capsule capsule, Take 1 capsule by mouth 2 (Two) Times a Day., Disp: , Rfl: 0  •  QUEtiapine (SEROquel) 25 MG tablet, Take 1 tablet by mouth Every Night., Disp: 90 tablet, Rfl: 0  •  topiramate (TOPAMAX) 100 MG tablet, Take 1 tablet by mouth 2 (Two) Times a Day., Disp: 180 tablet, Rfl: 3  •  doxycycline (MONODOX) 100 MG capsule, Take 1 capsule by mouth 2 (Two) Times a Day for 14 days., Disp: 28 capsule, Rfl: 0  •  metroNIDAZOLE (FLAGYL) 500 MG tablet, Take 1 tablet by mouth 3 (Three) Times a Day for 14 days., Disp: 42 tablet, Rfl: 0  •  pantoprazole (PROTONIX) 40 MG EC tablet, Take 1 BID x 14 days with antibiotics, Disp: 28 tablet, Rfl: 0    ALLERGIES:  Statins    VISIT VITALS:  /86   Pulse 96   Ht 177.8 cm (70\")   Wt 107 kg (236 lb 9.6 oz)   SpO2 98%   BMI " 33.95 kg/m²     PHYSICAL EXAMINATION:  Physical Exam   Constitutional: He is oriented to person, place, and time. He appears well-developed and well-nourished.   HENT:   Head: Normocephalic and atraumatic.   Eyes: Conjunctivae and EOM are normal. Pupils are equal, round, and reactive to light.   Neck: Normal range of motion. Neck supple.   Cardiovascular: Normal rate, regular rhythm and normal heart sounds.    Pulmonary/Chest: Effort normal and breath sounds normal.   Abdominal: Soft. Bowel sounds are normal.   Musculoskeletal: Normal range of motion.   Neurological: He is alert and oriented to person, place, and time. He has normal reflexes.   Skin: Skin is warm and dry.   Psychiatric: He has a normal mood and affect. His behavior is normal.   Nursing note and vitals reviewed.      Assessment/Plan      Diagnosis Plan   1. Bacterial infection due to Helicobacter pylori     2. Constipation, unspecified constipation type       REC  I talked with him about H. pylori infection.  I prescribed treatment with Doxycycline, Metronidazole, Pepto-Bismol, Pantoprazole.  It was recommended that he call back in a few weeks (after completion of H. pylori treatment) in order to schedule H. pylori breath test to confirm eradication of H. pylori infection.  For constipation, I suggested that he use MiraLAX.  I did not feel that further evaluation or intervention was needed at the present time.  No formal follow-up visit was scheduled, but I have asked the patient to return for follow-up if he experiences further difficulty in the future.    Return if symptoms worsen or fail to improve.           Scar Ayers III, MD

## 2018-03-23 ENCOUNTER — OFFICE VISIT (OUTPATIENT)
Dept: CARDIOLOGY | Facility: CLINIC | Age: 43
End: 2018-03-23

## 2018-03-23 VITALS
DIASTOLIC BLOOD PRESSURE: 71 MMHG | HEART RATE: 86 BPM | OXYGEN SATURATION: 96 % | WEIGHT: 234 LBS | RESPIRATION RATE: 16 BRPM | BODY MASS INDEX: 33.5 KG/M2 | SYSTOLIC BLOOD PRESSURE: 113 MMHG | HEIGHT: 70 IN

## 2018-03-23 DIAGNOSIS — IMO0001 IDDM (INSULIN DEPENDENT DIABETES MELLITUS): ICD-10-CM

## 2018-03-23 DIAGNOSIS — E78.5 DYSLIPIDEMIA: ICD-10-CM

## 2018-03-23 DIAGNOSIS — I10 ESSENTIAL HYPERTENSION: ICD-10-CM

## 2018-03-23 DIAGNOSIS — I25.2 OLD MYOCARDIAL INFARCTION: ICD-10-CM

## 2018-03-23 DIAGNOSIS — R94.31 ABNORMAL EKG: Primary | ICD-10-CM

## 2018-03-23 PROCEDURE — 99213 OFFICE O/P EST LOW 20 MIN: CPT | Performed by: PHYSICIAN ASSISTANT

## 2018-03-23 RX ORDER — CHLORAL HYDRATE 500 MG
1000 CAPSULE ORAL 2 TIMES DAILY WITH MEALS
Qty: 60 CAPSULE | Refills: 0
Start: 2018-03-23 | End: 2020-07-15 | Stop reason: SDUPTHER

## 2018-03-23 NOTE — PATIENT INSTRUCTIONS
BMI for Adults  Body mass index (BMI) is a number that is calculated from a person's weight and height. In most adults, the number is used to find how much of an adult's weight is made up of fat. BMI is not as accurate as a direct measure of body fat.  HOW IS BMI CALCULATED?  BMI is calculated by dividing weight in kilograms by height in meters squared. It can also be calculated by dividing weight in pounds by height in inches squared, then multiplying the resulting number by 703. Charts are available to help you find your BMI quickly and easily without doing this calculation.   HOW IS BMI INTERPRETED?  Health care professionals use BMI charts to identify whether an adult is underweight, at a normal weight, or overweight based on the following guidelines:  · Underweight: BMI less than 18.5.  · Normal weight: BMI between 18.5 and 24.9.  · Overweight: BMI between 25 and 29.9.  · Obese: BMI of 30 and above.  BMI is usually interpreted the same for males and females.  Weight includes both fat and muscle, so someone with a muscular build, such as an athlete, may have a BMI that is higher than 24.9. In cases like these, BMI may not accurately depict body fat. To determine if excess body fat is the cause of a BMI of 25 or higher, further assessments may need to be done by a health care provider.  WHY IS BMI A USEFUL TOOL?  BMI is used to identify a possible weight problem that may be related to a medical problem or may increase the risk for medical problems. BMI can also be used to promote changes to reach a healthy weight.     This information is not intended to replace advice given to you by your health care provider. Make sure you discuss any questions you have with your health care provider.     Document Released: 08/29/2005 Document Revised: 01/08/2016 Document Reviewed: 05/15/2015  [employer] Patient Education ©2017 [employer]       Calorie Counting for Weight Loss  Calories are energy you get from the  things you eat and drink. Your body uses this energy to keep you going throughout the day. The number of calories you eat affects your weight. When you eat more calories than your body needs, your body stores the extra calories as fat. When you eat fewer calories than your body needs, your body burns fat to get the energy it needs.  Calorie counting means keeping track of how many calories you eat and drink each day. If you make sure to eat fewer calories than your body needs, you should lose weight. In order for calorie counting to work, you will need to eat the number of calories that are right for you in a day to lose a healthy amount of weight per week. A healthy amount of weight to lose per week is usually 1-2 lb (0.5-0.9 kg). A dietitian can determine how many calories you need in a day and give you suggestions on how to reach your calorie goal.   WHAT IS MY MY PLAN?  My goal is to have __________ calories per day.   If I have this many calories per day, I should lose around __________ pounds per week.  WHAT DO I NEED TO KNOW ABOUT CALORIE COUNTING?  In order to meet your daily calorie goal, you will need to:  · Find out how many calories are in each food you would like to eat. Try to do this before you eat.  · Decide how much of the food you can eat.  · Write down what you ate and how many calories it had. Doing this is called keeping a food log.  WHERE DO I FIND CALORIE INFORMATION?  The number of calories in a food can be found on a Nutrition Facts label. Note that all the information on a label is based on a specific serving of the food. If a food does not have a Nutrition Facts label, try to look up the calories online or ask your dietitian for help.  HOW DO I DECIDE HOW MUCH TO EAT?  To decide how much of the food you can eat, you will need to consider both the number of calories in one serving and the size of one serving. This information can be found on the Nutrition Facts label. If a food does not  have a Nutrition Facts label, look up the information online or ask your dietitian for help.  Remember that calories are listed per serving. If you choose to have more than one serving of a food, you will have to multiply the calories per serving by the amount of servings you plan to eat. For example, the label on a package of bread might say that a serving size is 1 slice and that there are 90 calories in a serving. If you eat 1 slice, you will have eaten 90 calories. If you eat 2 slices, you will have eaten 180 calories.  HOW DO I KEEP A FOOD LOG?  After each meal, record the following information in your food log:  · What you ate.  · How much of it you ate.  · How many calories it had.  · Then, add up your calories.  Keep your food log near you, such as in a small notebook in your pocket. Another option is to use a mobile albino or website. Some programs will calculate calories for you and show you how many calories you have left each time you add an item to the log.  WHAT ARE SOME CALORIE COUNTING TIPS?  · Use your calories on foods and drinks that will fill you up and not leave you hungry. Some examples of this include foods like nuts and nut butters, vegetables, lean proteins, and high-fiber foods (more than 5 g fiber per serving).  · Eat nutritious foods and avoid empty calories. Empty calories are calories you get from foods or beverages that do not have many nutrients, such as candy and soda. It is better to have a nutritious high-calorie food (such as an avocado) than a food with few nutrients (such as a bag of chips).  · Know how many calories are in the foods you eat most often. This way, you do not have to look up how many calories they have each time you eat them.  · Look out for foods that may seem like low-calorie foods but are really high-calorie foods, such as baked goods, soda, and fat-free candy.  · Pay attention to calories in drinks. Drinks such as sodas, specialty coffee drinks, alcohol, and  juices have a lot of calories yet do not fill you up. Choose low-calorie drinks like water and diet drinks.  · Focus your calorie counting efforts on higher calorie items. Logging the calories in a garden salad that contains only vegetables is less important than calculating the calories in a milk shake.  · Find a way of tracking calories that works for you. Get creative. Most people who are successful find ways to keep track of how much they eat in a day, even if they do not count every calorie.  WHAT ARE SOME PORTION CONTROL TIPS?  · Know how many calories are in a serving. This will help you know how many servings of a certain food you can have.  · Use a measuring cup to measure serving sizes. This is helpful when you start out. With time, you will be able to estimate serving sizes for some foods.  · Take some time to put servings of different foods on your favorite plates, bowls, and cups so you know what a serving looks like.  · Try not to eat straight from a bag or box. Doing this can lead to overeating. Put the amount you would like to eat in a cup or on a plate to make sure you are eating the right portion.  · Use smaller plates, glasses, and bowls to prevent overeating. This is a quick and easy way to practice portion control. If your plate is smaller, less food can fit on it.  · Try not to multitask while eating, such as watching TV or using your computer. If it is time to eat, sit down at a table and enjoy your food. Doing this will help you to start recognizing when you are full. It will also make you more aware of what and how much you are eating.  HOW CAN I CALORIE COUNT WHEN EATING OUT?  · Ask for smaller portion sizes or child-sized portions.  · Consider sharing an entree and sides instead of getting your own entree.  · If you get your own entree, eat only half. Ask for a box at the beginning of your meal and put the rest of your entree in it so you are not tempted to eat it.  · Look for the calories  "on the menu. If calories are listed, choose the lower calorie options.  · Choose dishes that include vegetables, fruits, whole grains, low-fat dairy products, and lean protein. Focusing on smart food choices from each of the 5 food groups can help you stay on track at restaurants.  · Choose items that are boiled, broiled, grilled, or steamed.  · Choose water, milk, unsweetened iced tea, or other drinks without added sugars. If you want an alcoholic beverage, choose a lower calorie option. For example, a regular dana can have up to 700 calories and a glass of wine has around 150.  · Stay away from items that are buttered, battered, fried, or served with cream sauce. Items labeled \"crispy\" are usually fried, unless stated otherwise.  · Ask for dressings, sauces, and syrups on the side. These are usually very high in calories, so do not eat much of them.  · Watch out for salads. Many people think salads are a healthy option, but this is often not the case. Many salads come with salomon, fried chicken, lots of cheese, fried chips, and dressing. All of these items have a lot of calories. If you want a salad, choose a garden salad and ask for grilled meats or steak. Ask for the dressing on the side, or ask for olive oil and vinegar or lemon to use as dressing.  · Estimate how many servings of a food you are given. For example, a serving of cooked rice is ½ cup or about the size of half a tennis ball or one cupcake wrapper. Knowing serving sizes will help you be aware of how much food you are eating at restaurants. The list below tells you how big or small some common portion sizes are based on everyday objects.  ¨ 1 oz--4 stacked dice.  ¨ 3 oz--1 deck of cards.  ¨ 1 tsp--1 dice.  ¨ 1 Tbsp--½ a Ping-Pong ball.  ¨ 2 Tbsp--1 Ping-Pong ball.  ¨ ½ cup--1 tennis ball or 1 cupcake wrapper.  ¨ 1 cup--1 baseball.     This information is not intended to replace advice given to you by your health care provider. Make sure you " discuss any questions you have with your health care provider.     Document Released: 12/18/2006 Document Revised: 01/08/2016 Document Reviewed: 10/23/2014  [employer] Interactive Patient Education ©2017 [employer]

## 2018-03-23 NOTE — PROGRESS NOTES
LAUREN Majano  Thomas Mireles  1975 03/23/2018    Patient Active Problem List   Diagnosis   • Hypertension   • Hyperlipidemia   • Type 2 diabetes mellitus with hyperglycemia   • Bipolar II disorder   • Rosacea   • Abnormal EKG   • Old myocardial infarction by EKG criteria.   • Dyspnea on exertion (class 2-3)       Dear LAUREN Majano:    Chief Complaint   Patient presents with   • Results     Stress Echo     Subjective     Thomas Mireles is a 42 y.o. male with a past medical history significant for previously noted abnormal EKG with concern for old myocardial infarction.  He also complained complaints of shortness of breath with a history of hypertension, insulin-dependent diabetes mellitus, and dyslipidemia.  He therefore underwent further evaluation with a stress echocardiogram.  He achieved 90.40% of the predicted maximal heart rate and 10.1 METS of exercise.  At this level of stress there were no ST segment deviation on EKG and no echocardiographic evidence of myocardial ischemia as well.  It was overall considered negative.  Echo portion revealed a normal ejection fraction at 61-65% with no significant valvular dysfunction or pericardial effusion.  He presents back to the office today for follow-up visit. He complains of about 2 days of some dull aching in his left shoulder. He denies any known injury.  Does not occur with exertion.  It is mild and does not seem to be increasing in severity over the last couple days.  He has not appreciated if it is increased with range of motion of the left shoulder.  When he sits, he does tend to rest and put more weight on the left elbow and arm.  No associated nausea, diaphoresis, or shortness of breath.  No recent exertional symptoms or chest discomfort.  He has a history of statin intolerance.  He knows he took Lipitor and at least one other statin.  One of them cause significant weight gain of about 20 pounds in one month and the other cause severe aches and pains  "that were intolerable.      Current Outpatient Prescriptions:   •  butalbital-aspirin-caffeine (FIORINAL) -40 MG per capsule, Take 1 capsule by mouth 2 (Two) Times a Day As Needed for Headache., Disp: 40 capsule, Rfl: 0  •  cyclobenzaprine (FLEXERIL) 5 MG tablet, Take 1 tablet by mouth At Night As Needed for Muscle Spasms., Disp: 90 tablet, Rfl: 3  •  doxycycline (MONODOX) 100 MG capsule, Take 1 capsule by mouth 2 (Two) Times a Day for 14 days., Disp: 28 capsule, Rfl: 0  •  insulin degludec (TRESIBA FLEXTOUCH) 100 UNIT/ML solution pen-injector injection, Inject 40 Units under the skin Daily., Disp: 9 mL, Rfl: 3  •  lisinopril (PRINIVIL,ZESTRIL) 5 MG tablet, Take 1 tablet by mouth Daily., Disp: 90 tablet, Rfl: 3  •  metoprolol tartrate (LOPRESSOR) 25 MG tablet, Take 1 tablet by mouth Every 12 (Twelve) Hours., Disp: 60 tablet, Rfl: 1  •  metroNIDAZOLE (FLAGYL) 500 MG tablet, Take 1 tablet by mouth 3 (Three) Times a Day for 14 days., Disp: 42 tablet, Rfl: 0  •  naproxen (NAPROSYN) 500 MG tablet, Take 1 tablet by mouth 2 (Two) Times a Day With Meals., Disp: 180 tablet, Rfl: 3  •  pantoprazole (PROTONIX) 40 MG EC tablet, Take 1 BID x 14 days with antibiotics, Disp: 28 tablet, Rfl: 0  •  QUEtiapine (SEROquel) 25 MG tablet, Take 1 tablet by mouth Every Night., Disp: 90 tablet, Rfl: 0  •  topiramate (TOPAMAX) 100 MG tablet, Take 1 tablet by mouth 2 (Two) Times a Day., Disp: 180 tablet, Rfl: 3    The following portions of the patient's history were reviewed and updated as appropriate: allergies, current medications, past family history, past medical history, past social history, past surgical history and problem list.    Review of Systems   Constitution: Negative for malaise/fatigue.   Cardiovascular: Negative for chest pain, dyspnea on exertion, irregular heartbeat, leg swelling, near-syncope, orthopnea and palpitations.        Feels an \"achy feeling that comes and goes\" left arm   Respiratory: Negative for shortness " "of breath.      Objective   Blood pressure 113/71, pulse 86, resp. rate 16, height 177.8 cm (70\"), weight 106 kg (234 lb), SpO2 96 %.  Body mass index is 33.58 kg/m².    Physical Exam   Constitutional: He is oriented to person, place, and time. He appears well-developed and well-nourished. No distress.   HENT:   Head: Normocephalic and atraumatic.   Eyes: Conjunctivae are normal. Right eye exhibits no discharge. Left eye exhibits no discharge.   Neck: Normal range of motion. Neck supple. Carotid bruit is not present.   Cardiovascular: Normal rate, regular rhythm and normal heart sounds.  Exam reveals no gallop and no friction rub.    No murmur heard.  Pulmonary/Chest: Effort normal and breath sounds normal. No respiratory distress. He has no wheezes. He has no rales. He exhibits no tenderness.   Musculoskeletal: Normal range of motion. He exhibits no edema.   Neurological: He is alert and oriented to person, place, and time.   Skin: Skin is warm and dry. No rash noted. He is not diaphoretic. No erythema. No pallor.   Psychiatric: He has a normal mood and affect. His behavior is normal.   Nursing note and vitals reviewed.    Procedures  Stress echocardiogram 02/21/18  · Normal left ventricular cavity size and wall thickness noted. All left ventricular wall segments contract normally.  · Estimated EF appears to be in the range of 61 - 65%  · The aortic valve is structurally normal. No aortic valve regurgitation is present. No aortic valve stenosis is present.  · The mitral valve is normal in structure. No mitral valve regurgitation is present. No significant mitral valve stenosis is present.  · The tricuspid valve is normal. No tricuspid valve stenosis is present. No tricuspid valve regurgitation is present.  · There is no evidence of pericardial effusion.  · Stress Procedure  · A stress test was performed following the Enio protocol.  · Exercise duration (min) 7 min Exercise duration (sec) 30 sec Estimated workload " 10.1 METS  · Baseline Vitals Baseline  bpm Baseline /88 mmHg Peak Stress Vitals Peak  bpm Peak /86 mmHg Recovery Vitals Recovery  bpm Recovery /88 mmHg Exercise Data Target HR (85%) 150 bpm Max. Pred. HR (100%) 177 bpm Percent Max Pred HR 90.40 %  · There was no ST segment deviation noted during stress.  · Normal ECG stress ECG interpretation.  · Stress Echo Findings  · Segment augmentation had a normal response to stress.  · Cavity size behaved normally in response to stress.  · Normal stress echo with no significant echocardiographic evidence for myocardial ischemia.  Assessment:          Diagnosis Plan   1. Abnormal EKG     2. Old myocardial infarction by EKG criteria.     3. Essential hypertension      Controlled.   4. Dyslipidemia      With elevated triglycerides on last evaluation in November 2017.  LDL was slightly uncontrolled for a diabetic at 98.        Plan:       1. Discussed the patient's BMI with him. BMI is above normal parameters. Follow-up plan includes:  educational material.  2. Check fasting lipid panel, comprehensive metabolic panel, and hemoglobin A1c.  3. We'll go ahead and add fish oil 1000 mg twice daily and encouraged him to avoid triglycerides and carbohydrates.  4. If his LDL is still above goal (less than 70), may consider adding Zetia since he is intolerant to statins.  5. Continue to monitor left shoulder pain.  At this point, it is mostly atypical for angina and he has a negative stress echocardiogram.  If he develops recurrent or increasing discomfort, may consider CT coronary angiogram.  6. Follow up in 3-4 months of sooner if needed.     No Follow-up on file.    I appreciate the opportunity to participate in this patient's cardiovascular care.    Best Regards,    Madiha Villegas PA-C

## 2018-06-25 ENCOUNTER — OFFICE VISIT (OUTPATIENT)
Dept: CARDIOLOGY | Facility: CLINIC | Age: 43
End: 2018-06-25

## 2018-06-25 VITALS
SYSTOLIC BLOOD PRESSURE: 123 MMHG | HEART RATE: 98 BPM | WEIGHT: 224.8 LBS | BODY MASS INDEX: 32.18 KG/M2 | OXYGEN SATURATION: 97 % | HEIGHT: 70 IN | DIASTOLIC BLOOD PRESSURE: 84 MMHG

## 2018-06-25 DIAGNOSIS — I10 ESSENTIAL HYPERTENSION: Primary | ICD-10-CM

## 2018-06-25 DIAGNOSIS — E78.2 MIXED HYPERLIPIDEMIA: ICD-10-CM

## 2018-06-25 DIAGNOSIS — E11.65 TYPE 2 DIABETES MELLITUS WITH HYPERGLYCEMIA, UNSPECIFIED LONG TERM INSULIN USE STATUS: ICD-10-CM

## 2018-06-25 DIAGNOSIS — I25.2 OLD MYOCARDIAL INFARCTION: ICD-10-CM

## 2018-06-25 PROCEDURE — 93000 ELECTROCARDIOGRAM COMPLETE: CPT | Performed by: NURSE PRACTITIONER

## 2018-06-25 PROCEDURE — 99213 OFFICE O/P EST LOW 20 MIN: CPT | Performed by: NURSE PRACTITIONER

## 2018-06-25 NOTE — PROGRESS NOTES
LAUREN Majano  Thomas Mireles  1975 06/25/2018    Patient Active Problem List   Diagnosis   • Hypertension   • Hyperlipidemia   • Type 2 diabetes mellitus with hyperglycemia   • Bipolar II disorder   • Rosacea   • Abnormal EKG   • Old myocardial infarction by EKG criteria.   • Dyspnea on exertion (class 2-3)       Dear LAUREN Majano:    Subjective     Chief Complaint   Patient presents with   • Follow-up   • Med Management     Med list   • Palpitations     Fast           History of Present Illness:    Thomas Mireles is a 42 y.o. male presents today for routine cardiology follow up. He has a history of hypertension, hyperlipidemia, and insulin-dependent diabetes mellitus. He had a stress echocardiogram in February of 2018. There was no evidence of myocardial ischemia. Echo revealed normal EF with no significant valvular dysfunction. He had a previously abnormal EKG concerning for old MI. Today, he denies chest pain, shortness of breath, and dizziness. At times, he notices his heart rate is fast. This is mostly with exertion. He is a diabetic and his last hemoglobin A1c in January of 2018 was 11.1%. He has taken multiple statins in the past for dyslipidemia but cannot tolerate. He is currently taking Fish Oil. He has not yet had labs drawn which were ordered at last visit.         Allergies   Allergen Reactions   • Statins Other (See Comments)     All over pain,    :      Current Outpatient Prescriptions:   •  butalbital-aspirin-caffeine (FIORINAL) -40 MG per capsule, Take 1 capsule by mouth 2 (Two) Times a Day As Needed for Headache., Disp: 40 capsule, Rfl: 0  •  cyclobenzaprine (FLEXERIL) 5 MG tablet, Take 1 tablet by mouth At Night As Needed for Muscle Spasms., Disp: 90 tablet, Rfl: 3  •  insulin degludec (TRESIBA FLEXTOUCH) 100 UNIT/ML solution pen-injector injection, Inject 40 Units under the skin Daily., Disp: 9 mL, Rfl: 3  •  lisinopril (PRINIVIL,ZESTRIL) 5 MG tablet, Take 1 tablet by mouth Daily.,  "Disp: 90 tablet, Rfl: 3  •  metoprolol tartrate (LOPRESSOR) 25 MG tablet, Take 1 tablet by mouth Every 12 (Twelve) Hours., Disp: 180 tablet, Rfl: 1  •  naproxen (NAPROSYN) 500 MG tablet, Take 1 tablet by mouth 2 (Two) Times a Day With Meals., Disp: 180 tablet, Rfl: 3  •  Omega-3 Fatty Acids (FISH OIL) 1000 MG capsule capsule, Take 1 capsule by mouth 2 (Two) Times a Day With Meals., Disp: 60 capsule, Rfl: 0  •  pantoprazole (PROTONIX) 40 MG EC tablet, Take 1 BID x 14 days with antibiotics, Disp: 28 tablet, Rfl: 0  •  QUEtiapine (SEROquel) 25 MG tablet, Take 1 tablet by mouth Every Night., Disp: 90 tablet, Rfl: 0  •  topiramate (TOPAMAX) 100 MG tablet, Take 1 tablet by mouth 2 (Two) Times a Day., Disp: 180 tablet, Rfl: 3      The following portions of the patient's history were reviewed and updated as appropriate: allergies, current medications, past family history, past medical history, past social history, past surgical history and problem list.    Social History   Substance Use Topics   • Smoking status: Former Smoker     Quit date: 1/1/2009   • Smokeless tobacco: Never Used   • Alcohol use Yes      Comment: occassional-Once every 6 months       Review of Systems   Constitution: Negative for weakness and malaise/fatigue.   Cardiovascular: Positive for palpitations (Fast). Negative for chest pain, leg swelling and syncope.   Respiratory: Negative for shortness of breath.    Gastrointestinal: Negative for nausea and vomiting.   Neurological: Negative for dizziness and light-headedness.       Objective   Vitals:    06/25/18 0952   BP: 123/84   BP Location: Left arm   Patient Position: Sitting   Cuff Size: Adult   Pulse: 98   SpO2: 97%   Weight: 102 kg (224 lb 12.8 oz)   Height: 177.8 cm (70\")     Body mass index is 32.26 kg/m².        Physical Exam   Constitutional: He is oriented to person, place, and time. He appears well-developed and well-nourished.   HENT:   Head: Normocephalic and atraumatic.   Cardiovascular: " Normal rate, regular rhythm and normal heart sounds.  Exam reveals no S3 and no S4.    No murmur heard.  Pulmonary/Chest: Effort normal and breath sounds normal.   Abdominal: Soft. Bowel sounds are normal.   Neurological: He is alert and oriented to person, place, and time.   Skin: Skin is warm and dry.   Psychiatric: He has a normal mood and affect. His behavior is normal.       Lab Results   Component Value Date     (L) 01/25/2018    K 3.9 01/25/2018    CL 89 (L) 01/25/2018    CO2 27.3 01/25/2018    BUN 14 01/25/2018    CREATININE 0.84 01/25/2018    GLUCOSE 359 (H) 01/25/2018    CALCIUM 9.5 01/25/2018    AST 18 01/25/2018    ALT 18 01/25/2018    ALKPHOS 105 01/25/2018    LABIL2 1.5 01/25/2018             ECG 12 Lead  Date/Time: 6/25/2018 9:48 AM  Performed by: RENATE BARAKAT  Authorized by: RENATE BARAKAT   Comparison: compared with previous ECG   Similar to previous ECG  Rhythm: sinus rhythm  BPM: 98  Conduction: LAFB              Assessment/Plan    Diagnosis Plan   1. Essential hypertension  metoprolol tartrate (LOPRESSOR) 25 MG tablet   2. Mixed hyperlipidemia     3. Old myocardial infarction by EKG criteria.     4. Type 2 diabetes mellitus with hyperglycemia, unspecified long term insulin use status                  Recommendations:    1. Continue metoprolol, aspirin, and fish oil.    2. I have asked him to have labs drawn including CMP, FLP, and A1c    3. I do think hyperglycemia is contributing to the tachycardia. We will notify him of A1c results and I have asked him to follow up with his PCP.    4. He currently denies chest pain, dyspnea, and fatigue. Will continue to monitor at this point. He develops any new symptoms I have asked him to let us know. Would consider further workup at that time such as CT coronary angiogram. He voices understanding.    5. Follow up in 6 months and PRN.          Patient's Body mass index is 32.26 kg/m². BMI is above normal parameters. Recommendations include:  educational material.         Return in about 6 months (around 12/25/2018) for Recheck.    As always, I appreciate very much the opportunity to participate in the cardiovascular care of your patients.      With Best Regards,    Linda Zavala APRN

## 2018-06-26 ENCOUNTER — OFFICE VISIT (OUTPATIENT)
Dept: FAMILY MEDICINE CLINIC | Facility: CLINIC | Age: 43
End: 2018-06-26

## 2018-06-26 VITALS
WEIGHT: 227.8 LBS | BODY MASS INDEX: 32.61 KG/M2 | OXYGEN SATURATION: 100 % | HEART RATE: 110 BPM | DIASTOLIC BLOOD PRESSURE: 89 MMHG | HEIGHT: 70 IN | SYSTOLIC BLOOD PRESSURE: 132 MMHG

## 2018-06-26 DIAGNOSIS — E78.2 MIXED HYPERLIPIDEMIA: ICD-10-CM

## 2018-06-26 DIAGNOSIS — E11.65 TYPE 2 DIABETES MELLITUS WITH HYPERGLYCEMIA, WITH LONG-TERM CURRENT USE OF INSULIN (HCC): ICD-10-CM

## 2018-06-26 DIAGNOSIS — I10 ESSENTIAL HYPERTENSION: ICD-10-CM

## 2018-06-26 DIAGNOSIS — L71.9 ROSACEA: ICD-10-CM

## 2018-06-26 DIAGNOSIS — S09.90XD INJURY OF HEAD, SUBSEQUENT ENCOUNTER: ICD-10-CM

## 2018-06-26 DIAGNOSIS — Z79.4 TYPE 2 DIABETES MELLITUS WITH HYPERGLYCEMIA, WITH LONG-TERM CURRENT USE OF INSULIN (HCC): ICD-10-CM

## 2018-06-26 DIAGNOSIS — G47.00 INSOMNIA, UNSPECIFIED TYPE: ICD-10-CM

## 2018-06-26 DIAGNOSIS — R51.9 CHRONIC DAILY HEADACHE: ICD-10-CM

## 2018-06-26 DIAGNOSIS — F31.81 BIPOLAR 2 DISORDER (HCC): Primary | ICD-10-CM

## 2018-06-26 PROCEDURE — 99214 OFFICE O/P EST MOD 30 MIN: CPT | Performed by: NURSE PRACTITIONER

## 2018-06-26 RX ORDER — TOPIRAMATE 100 MG/1
100 TABLET, FILM COATED ORAL 2 TIMES WEEKLY
Qty: 180 TABLET | Refills: 3 | Status: SHIPPED | OUTPATIENT
Start: 2018-06-28 | End: 2019-01-23 | Stop reason: SDUPTHER

## 2018-06-26 RX ORDER — NAPROXEN 500 MG/1
500 TABLET ORAL 2 TIMES DAILY WITH MEALS
Qty: 180 TABLET | Refills: 3 | Status: SHIPPED | OUTPATIENT
Start: 2018-06-26 | End: 2019-01-23 | Stop reason: SDUPTHER

## 2018-06-26 RX ORDER — LISINOPRIL 5 MG/1
5 TABLET ORAL DAILY
Qty: 90 TABLET | Refills: 3 | Status: SHIPPED | OUTPATIENT
Start: 2018-06-26 | End: 2019-01-23 | Stop reason: SDUPTHER

## 2018-06-26 RX ORDER — CEPHALEXIN 500 MG/1
500 CAPSULE ORAL 3 TIMES DAILY
Qty: 21 CAPSULE | Refills: 0 | Status: SHIPPED | OUTPATIENT
Start: 2018-06-26 | End: 2018-09-11

## 2018-06-26 RX ORDER — CYCLOBENZAPRINE HCL 5 MG
5 TABLET ORAL NIGHTLY PRN
Qty: 90 TABLET | Refills: 3 | Status: SHIPPED | OUTPATIENT
Start: 2018-06-26 | End: 2019-01-23 | Stop reason: SDUPTHER

## 2018-06-26 RX ORDER — ASPIRIN 81 MG/1
81 TABLET ORAL DAILY
COMMUNITY
End: 2021-06-09 | Stop reason: SDUPTHER

## 2018-06-27 NOTE — PROGRESS NOTES
Subjective   Thomas Mireles is a 42 y.o. male.   Chief Compliant: The patient presents with Insomnia (script for Melatonin) and Follow-up (wants to discuss staying off work)    Returns today with request for new referral as the Physican he was scheduled with is no longer at the practice.  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  Since now has a daily headache can pinpoint the direct point of pain daily.   Seen Neuro started on topamax and change of medication withou) problem. The current episode started more than 1 year ago. The problem occurs daily. The problem has been unchanged. The pain is located in the parietal region. The pain does not radiate. The pain quality is similar to prior headaches. The quality of the pain is described as aching, dull and throbbing. The pain is at a severity of 4/10. The pain is moderate. Associated symptoms include insomnia and scalp tenderness. Pertinent negatives include no abdominal pain, abnormal behavior, anorexia, back pain, blurred vision, coughing, dizziness, drainage, ear pain, eye pain, eye redness, eye watering, facial sweating, fever, hearing loss, loss of balance, muscle aches, nausea, neck pain, numbness, phonophobia, photophobia, rhinorrhea, seizures, sinus pressure, sore throat, swollen glands, tingling, tinnitus, visual change (recent eye exam without any changes), vomiting, weakness or weight loss. Associated symptoms comments: Pain is described as sharp quick buzz or sting in the back of his head.  Patient can pinpoint the direct area.  Pain is aggravating every aspect of his life and further worsening his anxiety and depression   . Nothing aggravates the symptoms. He has tried acetaminophen, Excedrin and NSAIDs (Seen chiropractor no imprpvments) for the symptoms. The treatment provided mild relief. His past medical history  is significant for hypertension. (Rosacea)   Hypertension   This is a chronic (Seen cardiology yesterday with medication adjustment.  Patient has not picked up the medication yet. ) problem. The current episode started more than 1 year ago. The problem has been waxing and waning (Denies any new concerns or symptoms.  ) since onset. Associated symptoms include headaches. Pertinent negatives include no anxiety, blurred vision, chest pain, malaise/fatigue, neck pain, orthopnea, palpitations, peripheral edema, PND, shortness of breath or sweats. Risk factors for coronary artery disease include family history, obesity and smoking/tobacco exposure. Current antihypertension treatment includes ACE inhibitors. The current treatment provides moderate improvement. Compliance problems include exercise, psychosocial issues, medication side effects, medication cost and diet.    Diabetes   He presents for his follow-up diabetic visit. He has type 2 diabetes mellitus. His disease course has been stable (States he feels ok admits he isnt as compliant as he would actually like to be.  States the daily headache distracts him and leads a downward decline of his health habits,  Admits to good and bad days ). Hypoglycemia symptoms include headaches, mood changes (with known bipolar) and nervousness/anxiousness. Pertinent negatives for hypoglycemia include no confusion, dizziness, hunger, pallor, seizures, sleepiness, speech difficulty, sweats or tremors. There are no diabetic associated symptoms. Pertinent negatives for diabetes include no blurred vision, no chest pain, no fatigue, no foot paresthesias, no foot ulcerations, no polydipsia, no polyphagia, no polyuria, no visual change (recent eye exam without any changes), no weakness and no weight loss. There are no hypoglycemic complications. Symptoms are stable. There are no diabetic complications. Risk factors for coronary artery disease include dyslipidemia, family history, obesity,  male sex, hypertension, sedentary lifestyle and stress. Current diabetic treatment includes insulin injections and oral agent (dual therapy). Compliance with diabetes treatment: states he is taking his medications everyday  His weight is stable. He is following a generally healthy diet. Meal planning includes avoidance of concentrated sweets. He never participates in exercise. Home blood sugar record trend: honest to report he is not monitoring routinely. An ACE inhibitor/angiotensin II receptor blocker is being taken. He does not see a podiatrist.Eye exam is current.   Depression   Visit Type: follow-up (Hasnt followed with psych since Feb.  Admits mood is low at times. Most of the time jsut irritated and constant aggravation.  Dialy headache continues to aggravate his mood)  Patient presents with the following symptoms: decreased concentration, depressed mood, dizziness, excessive worry, insomnia, malaise, muscle tension and nervousness/anxiety.  Patient is not experiencing: confusion, palpitations, shortness of breath, suicidal ideas, suicidal planning, thoughts of death and weight loss.  Frequency of symptoms: constantly   Severity: interfering with daily activities   Sleep quality: fair  Side effects:  Headaches  Rash   This is a recurrent problem. The current episode started more than 1 year ago. The problem has been waxing and waning since onset. The affected locations include the face (New area of increased redness and swelling on his nose. Feels more irritated than usual. ). The rash is characterized by dryness, redness and burning. He was exposed to nothing. Pertinent negatives include no anorexia, cough, eye pain, fatigue, fever, rhinorrhea, shortness of breath, sore throat or vomiting. Past treatments include topical steroids and moisturizer (moistruizer). The treatment provided mild relief. (Rosacea)   Insomnia   This is a recurrent problem. The current episode started more than 1 month ago. The problem  "occurs intermittently. The problem has been unchanged. Associated symptoms include headaches and a rash. Pertinent negatives include no abdominal pain, anorexia, chest pain, coughing, fatigue, fever, nausea, neck pain, numbness, sore throat, swollen glands, visual change (recent eye exam without any changes), vomiting or weakness. The symptoms are aggravated by stress (depression ). He has tried relaxation for the symptoms. The treatment provided mild relief.      The following portions of the patient's history were reviewed and updated as appropriate: allergies, current medications, past family history, past medical history, past social history, past surgical history and problem list.      Review of Systems   Constitutional: Negative.  Negative for activity change, fatigue, fever, malaise/fatigue and weight loss.   HENT: Negative for ear pain, hearing loss, rhinorrhea, sinus pressure, sore throat and tinnitus.    Eyes: Negative for blurred vision, photophobia, pain and redness.   Respiratory: Negative.  Negative for cough and shortness of breath.    Cardiovascular: Negative.  Negative for chest pain, palpitations, orthopnea and PND.   Gastrointestinal: Negative for abdominal pain, anorexia, nausea and vomiting.   Endocrine: Negative for polydipsia, polyphagia and polyuria.   Genitourinary: Negative.    Musculoskeletal: Negative for back pain and neck pain.   Skin: Positive for rash. Negative for pallor.   Neurological: Positive for headaches. Negative for dizziness, tingling, tremors, seizures, syncope, facial asymmetry, speech difficulty, weakness, light-headedness, numbness and loss of balance.   Psychiatric/Behavioral: Positive for agitation, behavioral problems and decreased concentration. Negative for confusion and suicidal ideas. The patient is nervous/anxious and has insomnia.    All other systems reviewed and are negative.      Procedures    Vitals: Blood pressure 132/89, pulse 110, height 177.8 cm (70\"), " weight 103 kg (227 lb 12.8 oz), SpO2 100 %.     Allergies:   Allergies   Allergen Reactions   • Statins Other (See Comments)     All over pain,           Objective   Physical Exam   Constitutional: He is oriented to person, place, and time. He appears well-developed and well-nourished. No distress.   HENT:   Head: Normocephalic.   Right Ear: Hearing, tympanic membrane, external ear and ear canal normal.   Left Ear: Hearing, tympanic membrane, external ear and ear canal normal.   Nose: Nose normal. No mucosal edema or rhinorrhea. Right sinus exhibits no maxillary sinus tenderness and no frontal sinus tenderness. Left sinus exhibits no maxillary sinus tenderness and no frontal sinus tenderness.   Mouth/Throat: Uvula is midline, oropharynx is clear and moist and mucous membranes are normal. No oropharyngeal exudate. Tonsils are 0 on the right. Tonsils are 0 on the left. No tonsillar exudate.   Scalp tenderness with light palpitations     Eyes: Conjunctivae are normal. Pupils are equal, round, and reactive to light. Right eye exhibits no discharge. Left eye exhibits no discharge.   Neck: Neck supple. No thyromegaly present.   Cardiovascular: Normal rate, regular rhythm and normal heart sounds.    No murmur heard.  Pulmonary/Chest: Effort normal and breath sounds normal.   Lymphadenopathy:     He has no cervical adenopathy.   Neurological: He is alert and oriented to person, place, and time. He has normal reflexes. He displays normal reflexes. No cranial nerve deficit. Coordination normal.   Skin: Skin is warm and dry. Rash noted. He is not diaphoretic. There is erythema.   Around nose, hair line and mouth wit inflamed macular papular lesion        Psychiatric: He has a normal mood and affect. His behavior is normal. Judgment and thought content normal.   Nursing note and vitals reviewed.      Assessment/Plan   Discussed with patient impression and plan, patient verbalizes understanding.    Encouraged compliancy with  medications.    Will RTC for fasting labs in the am  Agreed to local psych referral   Continue with new meds form cardiology         Thomas was seen today for insomnia and follow-up.    Diagnoses and all orders for this visit:    Bipolar 2 disorder  -     Ambulatory Referral to Psychiatry    Type 2 diabetes mellitus with hyperglycemia, with long-term current use of insulin  -     CBC & Differential  -     Comprehensive Metabolic Panel  -     Hemoglobin A1c  -     Lipid Panel  -     TSH  -     Vitamin B12    Essential hypertension    Mixed hyperlipidemia    Injury of head, subsequent encounter    Chronic daily headache    Insomnia, unspecified type    Rosacea    Other orders  -     topiramate (TOPAMAX) 100 MG tablet; Take 1 tablet by mouth 2 (Two) Times a Week.  -     lisinopril (PRINIVIL,ZESTRIL) 5 MG tablet; Take 1 tablet by mouth Daily.  -     cyclobenzaprine (FLEXERIL) 5 MG tablet; Take 1 tablet by mouth At Night As Needed for Muscle Spasms.  -     insulin degludec (TRESIBA FLEXTOUCH) 100 UNIT/ML solution pen-injector injection; Inject 40 Units under the skin Daily.  -     Insulin Pen Needle (ULTRA-THIN II PEN NEEDLES) 29G X 12.7MM misc; 1 each Daily.  -     naproxen (NAPROSYN) 500 MG tablet; Take 1 tablet by mouth 2 (Two) Times a Day With Meals.  -     cephalexin (KEFLEX) 500 MG capsule; Take 1 capsule by mouth 3 (Three) Times a Day.      [facility] # [id number] Reviewed and is consistent.  UDS  reviewed and is consistent.  Patient comfort assessment guide reviewed and updated today.    As part of the patient's treatment plan they are being prescribed a controlled substance/ substances with potential for abuse.  This patient has been made aware of the appropriate use of such medications, including potential risk of somnolence, limited ability to drive and/or work safely, and potential for overdose.  It has also been made clear these medications are for use by the patient only, without concomitant use of alcohol or  other substances unless prescribed/advised by medical provider.    Patient has completed prescribing agreement detailing terms of continued prescribing of controlled substances including monitoring GLENN reports, urine drug screens, and pill counts.  The patient is aware GLENN reports are reviewed on a regular basis and scanned into the chart.    History and physical exam exhibit continued safe and appropriate use of controlled substances.

## 2018-07-02 ENCOUNTER — TELEPHONE (OUTPATIENT)
Dept: CARDIOLOGY | Facility: CLINIC | Age: 43
End: 2018-07-02

## 2018-07-02 NOTE — TELEPHONE ENCOUNTER
Called and spoke with patient to remind him of his labs that are passed due.  He stated she was going to his PCP and have these performed this week.

## 2018-07-11 ENCOUNTER — OFFICE VISIT (OUTPATIENT)
Dept: PSYCHIATRY | Facility: CLINIC | Age: 43
End: 2018-07-11

## 2018-07-11 VITALS
WEIGHT: 232 LBS | BODY MASS INDEX: 33.21 KG/M2 | HEART RATE: 85 BPM | SYSTOLIC BLOOD PRESSURE: 115 MMHG | HEIGHT: 70 IN | DIASTOLIC BLOOD PRESSURE: 79 MMHG

## 2018-07-11 DIAGNOSIS — F31.62 BIPOLAR 1 DISORDER, MIXED, MODERATE (HCC): Primary | ICD-10-CM

## 2018-07-11 PROCEDURE — 90792 PSYCH DIAG EVAL W/MED SRVCS: CPT | Performed by: NURSE PRACTITIONER

## 2018-07-11 RX ORDER — QUETIAPINE FUMARATE 25 MG/1
25 TABLET, FILM COATED ORAL NIGHTLY
Qty: 90 TABLET | Refills: 0 | Status: SHIPPED | OUTPATIENT
Start: 2018-07-11 | End: 2018-09-11 | Stop reason: SDUPTHER

## 2018-07-11 NOTE — PROGRESS NOTES
"Subjective   Thomas Mireles is a 42 y.o. male who is here today for initial appointment to evaluate for medication options. Referred by Kaycee Aleman.     Chief Complaint:  \"I just need a rovider for bipolar\"    HPI: Had ceiling tile fell on his head at work Sept 2016.  States he did not have a skull fracture.  Has had constant headaches since. It was at this time that he started feeling worse again with the bipolar.  Pt says he thinks he spent a \"good portion of 2015 manic\". Thinks wintertime slows it down.  Says his manic phases witll last for weeks at a time.  During manic time he says he will require minimal sleep, increased irritability, increased spending money and hypersexuality, feels very grandiose.  When he comes off a vazquez he will be down for several months.  Has trouble getting out of bed, does not want to bathe, , calls in a lot during those time. No history of any A/V hallucinations.  Has had suicidal thoughts in the past, the last episode was over the winter.  Pt had a plan in that he had had a recent tooth extraction and he considered fatally overdosing on the pain med.  Pt says that the thought of his \"folks\" dealing with that.  Denies suicidal thoughts now or suicidal thoughts.  Rates depression a 5/10.  Rates anxiety a 3/10.  Denies any OCD tendencies.  Denies any panic attacks or PTSD like symptoms.  Denies chronic worrying. Body mass index is 33.29 kg/m². gained 5 lbs since last visit.  Sleeping at least 9-10 hours sleep at night. Denies any current vazquez symptoms.   History of Present Illness    Past Psych History:  Diagnosed bipolar II age 26 Dr ruiz psychiatrist in [place].  Was prescribed Lithium in the beginning.  Made him sick more often than not. After Lithium was on Lamictal for a few years.  Lamictal worked but it made him react to sunlight.  Currently on topomax and Seroquel.  Inpatient nov 2017 at [facility] for Bipolar 11 with suicidal thoughts.  Had been placed on Haldol by a " "neurologist and it threw him the total opposite way.  Had ceiling tile fell on his head at work Sept 2016.  States he did not have a skull fracture.  Has had constant headaches since.    Previous Psych Meds:  Currently on topomax, Seroqel.  See above for interactions of previous.  Took paxil years back and it did not cause manic episodes.   Substance Abuse:  In past drank ETOH daily 1 bottle.  This was prior to Lithium.  Pt took self off when he started Lithium.  He is a social drinker.  Previous smoker.  Quit 10 years ago.      Social History:  Born and raised [place] by 2 parents.  Graduated HS and has completed 3 years at [employer].  Attends [employer] due to graduate in October with [employer] degree. Plans to work in a [employer] upon graduation.  Lives with parents.  Never .  Not currently in a relationship.  Prefers women.  No Taoist preference. Does not believe in Yarsanism.  He states he is more agnostic. Bullied a lot in school because he says he was the \"fat kid who wore glasses\".  No  history.  No incarcerations, no pending legal issues. Currently works at a [employer] enter at a [employer] center in [place].        Family Psychiatric History:  family history includes Diabetes in his maternal grandfather; Heart disease in his maternal grandfather; Hypertension in his father and mother; Liver cancer in his paternal grandfather.      Medical/Surgical History:  Past Medical History:   Diagnosis Date   • Anxiety    • Bipolar 1 disorder (CMS/HCC)    • Depression    • Head injury 2016   • Hyperlipidemia    • Hypertension    • Migraine    • Obesity    • Psychiatric illness    • Type 2 diabetes mellitus (CMS/HCC)      Past Surgical History:   Procedure Laterality Date   • KNEE SURGERY  1992    right   • NASAL POLYP EXCISION  2009   • URETHRAL DILATATION      infant       Allergies   Allergen Reactions   • Statins Other (See Comments)     All over pain,            Current Medications:   Current Outpatient " "Prescriptions   Medication Sig Dispense Refill   • aspirin 81 MG EC tablet Take 81 mg by mouth Daily.     • butalbital-aspirin-caffeine (FIORINAL) -40 MG per capsule Take 1 capsule by mouth 2 (Two) Times a Day As Needed for Headache. 40 capsule 0   • cephalexin (KEFLEX) 500 MG capsule Take 1 capsule by mouth 3 (Three) Times a Day. 21 capsule 0   • cyclobenzaprine (FLEXERIL) 5 MG tablet Take 1 tablet by mouth At Night As Needed for Muscle Spasms. 90 tablet 3   • insulin degludec (TRESIBA FLEXTOUCH) 100 UNIT/ML solution pen-injector injection Inject 40 Units under the skin Daily. 9 mL 3   • Insulin Pen Needle (ULTRA-THIN II PEN NEEDLES) 29G X 12.7MM misc 1 each Daily. 100 each 1   • lisinopril (PRINIVIL,ZESTRIL) 5 MG tablet Take 1 tablet by mouth Daily. 90 tablet 3   • metoprolol tartrate (LOPRESSOR) 25 MG tablet Take 1 tablet by mouth Every 12 (Twelve) Hours. 180 tablet 1   • naproxen (NAPROSYN) 500 MG tablet Take 1 tablet by mouth 2 (Two) Times a Day With Meals. 180 tablet 3   • Omega-3 Fatty Acids (FISH OIL) 1000 MG capsule capsule Take 1 capsule by mouth 2 (Two) Times a Day With Meals. 60 capsule 0   • QUEtiapine (SEROquel) 25 MG tablet Take 1 tablet by mouth Every Night. 90 tablet 0   • topiramate (TOPAMAX) 100 MG tablet Take 1 tablet by mouth 2 (Two) Times a Week. 180 tablet 3     No current facility-administered medications for this visit.          Review of Systems   Musculoskeletal: Positive for back pain, joint swelling and myalgias.    denies HEENT, cardiovascular, respiratory, liver, renal, GI/, endocrine, neuro, DERM, hematology, immunology, musculoskeletal disorders.    Objective   Physical Exam   Constitutional: He appears well-developed and well-nourished.   Psychiatric: He has a normal mood and affect. His speech is normal and behavior is normal. Judgment and thought content normal. Cognition and memory are normal.   Vitals reviewed.    Blood pressure 115/79, pulse 85, height 177.8 cm (70\"), " "weight 105 kg (232 lb).    Mental Status Exam:   Hygiene:   good  Cooperation:  Cooperative  Eye Contact:  Good  Psychomotor Behavior:  Appropriate  Affect:  Full range  Hopelessness: Denies  Speech:  Normal  Thought Process:  Goal directed  Thought Content:  Normal  Suicidal:  None  Homicidal:  None  Hallucinations:  None  Delusion:  None  Memory:  Intact  Orientation:  Person, Place, Time and Situation  Reliability:  good  Insight:  Good  Judgement:  Good  Impulse Control:  Good  Physical/Medical Issues:  Yes diabetes, chronic headaches      Short-term goals: Patient will be compliant with clinic appointments.  Patient will be engaged in therapy, medication compliant with minimal side effects. Patient  will report decrease of symptoms and frequency.    Long-term goals: Patient will have minimal symptoms of  with continued medication management. Patient will be compliant with treatment and appointments.       Problem list:   Strengths:insight to illness  Weaknesses: He says \"I am notorious for being self depreciated'    Assessment/Plan   Problems Addressed this Visit     None      Visit Diagnoses     Bipolar 1 disorder, mixed, moderate (CMS/HCC)    -  Primary    Relevant Medications    QUEtiapine (SEROquel) 25 MG tablet        Dennys reviewed.   ·   Functionality: pt having minimal impairment in important areas of daily functioning.  Prognosis: Guarded dependent on medication/follow up and treatment plan compliance.    Discussed medication options.  Patient is currently pleased with his medication regimen.  I will continue this medication as is.  I have recommended that he see a therapist for psychotherapy and he is in agreement with this.  I will schedule him with Wes Weiss.  Patient does not need a refill of Topamax at this time.  He is aware of the risks of Seroquel usage including the risk of increased cholesterol, increased blood sugar, increased risk limit disorders.  Discussed the risks, benefits, and side " effects of the medication; client acknowledged and verbally consented.  Patient is aware to contact the [facility] with any worsening of symptom.  Patient is agreeable to go to the ER or call 911 should they begin SI/HI.  Patient will return to clinic in 2 months     Return in 4 weeks

## 2018-07-13 LAB
ALBUMIN SERPL-MCNC: 4.1 G/DL (ref 3.5–5)
ALBUMIN/GLOB SERPL: 1.4 G/DL (ref 1.5–2.5)
ALP SERPL-CCNC: 95 U/L (ref 40–129)
ALT SERPL W P-5'-P-CCNC: 42 U/L (ref 10–44)
ANION GAP SERPL CALCULATED.3IONS-SCNC: 5.2 MMOL/L (ref 3.6–11.2)
AST SERPL-CCNC: 23 U/L (ref 10–34)
BASOPHILS # BLD AUTO: 0.02 10*3/MM3 (ref 0–0.3)
BASOPHILS NFR BLD AUTO: 0.5 % (ref 0–2)
BILIRUB SERPL-MCNC: 1.1 MG/DL (ref 0.2–1.8)
BUN BLD-MCNC: 13 MG/DL (ref 7–21)
BUN/CREAT SERPL: 18.8 (ref 7–25)
CALCIUM SPEC-SCNC: 9.4 MG/DL (ref 7.7–10)
CHLORIDE SERPL-SCNC: 104 MMOL/L (ref 99–112)
CHOLEST SERPL-MCNC: 166 MG/DL (ref 0–200)
CO2 SERPL-SCNC: 30.8 MMOL/L (ref 24.3–31.9)
CREAT BLD-MCNC: 0.69 MG/DL (ref 0.43–1.29)
DEPRECATED RDW RBC AUTO: 37.9 FL (ref 37–54)
EOSINOPHIL # BLD AUTO: 0.15 10*3/MM3 (ref 0–0.7)
EOSINOPHIL NFR BLD AUTO: 4.1 % (ref 0–5)
ERYTHROCYTE [DISTWIDTH] IN BLOOD BY AUTOMATED COUNT: 12.8 % (ref 11.5–14.5)
GFR SERPL CREATININE-BSD FRML MDRD: 125 ML/MIN/1.73
GLOBULIN UR ELPH-MCNC: 2.9 GM/DL
GLUCOSE BLD-MCNC: 197 MG/DL (ref 70–110)
HBA1C MFR BLD: 9.6 % (ref 4.5–5.7)
HCT VFR BLD AUTO: 38 % (ref 42–52)
HDLC SERPL-MCNC: 35 MG/DL (ref 60–100)
HGB BLD-MCNC: 13.7 G/DL (ref 14–18)
IMM GRANULOCYTES # BLD: 0 10*3/MM3 (ref 0–0.03)
IMM GRANULOCYTES NFR BLD: 0 % (ref 0–0.5)
LDLC SERPL CALC-MCNC: 98 MG/DL (ref 0–100)
LDLC/HDLC SERPL: 2.79 {RATIO}
LYMPHOCYTES # BLD AUTO: 1.1 10*3/MM3 (ref 1–3)
LYMPHOCYTES NFR BLD AUTO: 29.9 % (ref 21–51)
MCH RBC QN AUTO: 30.1 PG (ref 27–33)
MCHC RBC AUTO-ENTMCNC: 36.1 G/DL (ref 33–37)
MCV RBC AUTO: 83.5 FL (ref 80–94)
MONOCYTES # BLD AUTO: 0.44 10*3/MM3 (ref 0.1–0.9)
MONOCYTES NFR BLD AUTO: 12 % (ref 0–10)
NEUTROPHILS # BLD AUTO: 1.97 10*3/MM3 (ref 1.4–6.5)
NEUTROPHILS NFR BLD AUTO: 53.5 % (ref 30–70)
OSMOLALITY SERPL CALC.SUM OF ELEC: 285 MOSM/KG (ref 273–305)
PLATELET # BLD AUTO: 288 10*3/MM3 (ref 130–400)
PMV BLD AUTO: 8.9 FL (ref 6–10)
POTASSIUM BLD-SCNC: 4.2 MMOL/L (ref 3.5–5.3)
PROT SERPL-MCNC: 7 G/DL (ref 6–8)
RBC # BLD AUTO: 4.55 10*6/MM3 (ref 4.7–6.1)
SODIUM BLD-SCNC: 140 MMOL/L (ref 135–153)
TRIGL SERPL-MCNC: 166 MG/DL (ref 0–150)
TSH SERPL DL<=0.05 MIU/L-ACNC: 0.9 MIU/ML (ref 0.55–4.78)
VIT B12 BLD-MCNC: 388 PG/ML (ref 211–911)
VLDLC SERPL-MCNC: 33.2 MG/DL
WBC NRBC COR # BLD: 3.68 10*3/MM3 (ref 4.5–12.5)

## 2018-07-13 PROCEDURE — 80061 LIPID PANEL: CPT | Performed by: NURSE PRACTITIONER

## 2018-07-13 PROCEDURE — 85025 COMPLETE CBC W/AUTO DIFF WBC: CPT | Performed by: NURSE PRACTITIONER

## 2018-07-13 PROCEDURE — 84443 ASSAY THYROID STIM HORMONE: CPT | Performed by: NURSE PRACTITIONER

## 2018-07-13 PROCEDURE — 36415 COLL VENOUS BLD VENIPUNCTURE: CPT | Performed by: NURSE PRACTITIONER

## 2018-07-13 PROCEDURE — 83036 HEMOGLOBIN GLYCOSYLATED A1C: CPT | Performed by: NURSE PRACTITIONER

## 2018-07-13 PROCEDURE — 80053 COMPREHEN METABOLIC PANEL: CPT | Performed by: NURSE PRACTITIONER

## 2018-07-13 PROCEDURE — 82607 VITAMIN B-12: CPT | Performed by: NURSE PRACTITIONER

## 2018-07-16 ENCOUNTER — TELEPHONE (OUTPATIENT)
Dept: FAMILY MEDICINE CLINIC | Facility: CLINIC | Age: 43
End: 2018-07-16

## 2018-07-16 NOTE — TELEPHONE ENCOUNTER
----- Message from LAUREN Majano sent at 7/16/2018 12:43 PM EDT -----  Let Jas know his labs are much improved from the past Tell him to keep up the improved diet and stay on his medications      Patient notified & verbalized understanding.

## 2018-07-23 ENCOUNTER — OFFICE VISIT (OUTPATIENT)
Dept: PSYCHIATRY | Facility: CLINIC | Age: 43
End: 2018-07-23

## 2018-07-23 DIAGNOSIS — F31.60 BIPOLAR 1 DISORDER, MIXED (HCC): Primary | ICD-10-CM

## 2018-07-23 PROCEDURE — 90832 PSYTX W PT 30 MINUTES: CPT | Performed by: SOCIAL WORKER

## 2018-07-23 NOTE — PROGRESS NOTES
Date of Service: July 23, 2018  Time In: 11:55 AM  Time Out: 12:30 PM      PROGRESS NOTE  Data:  Thomas Mireles is a 42 y.o. male who met with the undersigned 1:1 for a regularly scheduled individual outpatient therapy session at [facility] following referral from LAUREN Cervantes for bipolar disorder.     HPI: Patient reports he feels he is relatively stable at this time and states he continues to struggle with periods of depression including depressed mood, anhedonia, anergia, feeling hopeless, low self-esteem, and periods of social isolation.  Patient rates current symptoms of depression at a 4 on a scale of 1-10 with 10 being most severe.  He also reports periods of manic symptoms including racing thoughts, impulsive behavior, decreased need for sleep, increased energy, and engaging in activities was a high likelihood of negative outcome including going on spending sprees and engaging in promiscuous behavior.  Patient reports following a manic phase of several weeks he struggles with significant depression for several months.  He also reports he seems to struggle with increased symptoms of depression during the winter time.  Patient reports he feels he primarily struggles with the depressive phase of his illness.  She admits he has a history of intermittent suicidal ideation which led to his hospitalization in November 2017 at the [facility].  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  He states he is hopeful he'll be able to get a job at a local [employer] restaurant.  The patient reports he continues to adhere to medication regimen as prescribed and states he feels it is helpful.  Patient adamantly convincingly denies suicidal ideation vehemently denies any substance use.      Clinical Maneuvering/Intervention:  Assisted patient in  processing above session content; acknowledged and normalized patient’s thoughts, feelings, and concerns.  Discussed the therapist/patient relationship and explain the parameters and limitations of relative confidentiality.  Also discussed the importance of regular attendance, active participation, and honesty to the treatment process.  Also discussed the importance of continuing and pharmacotherapy and strictly adhering to medication regimen as prescribed.  David demonstrated delirium were thought process and assisted the patient with identifying warning signs he is struggling with manic symptoms.  Also assisted the patient in developing appropriate coping mechanisms decrease severity and frequency of symptoms of depression including positive self talk, focusing on healthy skills of daily living, and actively seeking enjoyable activities he can engage in regular basis to reduce idle time.  Provided unconditional positive regard in a safe, supportive environment.    Allowed patient to freely discuss issues without interruption or judgment. Provided safe, confidential environment to facilitate the development of positive therapeutic relationship and encourage open, honest communication. Assisted patient in identifying risk factors which would indicate the need for higher level of care including thoughts to harm self or others and/or self-harming behavior and encouraged patient to contact this office, call 911, or present to the nearest emergency room should any of these events occur. Discussed crisis intervention services and means to access.  Patient adamantly and convincingly denies current suicidal or homicidal ideation or perceptual disturbance.    Assessment    Patient appears to have a long history of bipolar disorder and continues to struggle with intermittent periods of vazquez and several months of significant depression afterward.  Patient's symptomology continues to cause impairment important functioning.   As a result, the patient to be reasonably expected to continue to benefit from treatment and would likely be at increased risk for decompensation in her life.    Diagnoses and all orders for this visit:    Bipolar 1 disorder, mixed (CMS/HCC)               Mental Status Exam  Hygiene:  good  Dress:  casual  Attitude:  Cooperative  Motor Activity:  Appropriate  Speech:  Normal  Mood:  depressed  Affect:  calm and pleasant  Thought Processes:  Goal directed  Thought Content:  normal  Suicidal Thoughts:  denies  Homicidal Thoughts:  denies  Crisis Safety Plan: yes, to come to the emergency room.  Hallucinations:  denies    Patient's Support Network Includes:  parents    Progress toward goal: Not at goal    Functional Status: Moderate impairment     Prognosis: Fair with Ongoing Treatment     Plan         Will continue in individual outpatient therapy session at [facility] in 4 weeks and pharmacotherapy as scheduled with LAUREN Cervantes.  Patient will adhere to medication regimen as prescribed and report any side effects. Patient will contact this office, call 911 or present to the nearest emergency room should suicidal or homicidal ideations occur. Provide Cognitive Behavioral Therapy and Integrative Therapy to improve functioning, maintain stability, and avoid decompensation and the need for higher level of care.          Return in about 4 weeks (around 8/20/2018) for Next scheduled follow up.      This document signed by Wes Weiss LCSW, KIM July 23, 2018 2:17 PM

## 2018-08-27 ENCOUNTER — OFFICE VISIT (OUTPATIENT)
Dept: PSYCHIATRY | Facility: CLINIC | Age: 43
End: 2018-08-27

## 2018-08-27 DIAGNOSIS — F31.60 BIPOLAR 1 DISORDER, MIXED (HCC): Primary | ICD-10-CM

## 2018-08-27 PROCEDURE — 90837 PSYTX W PT 60 MINUTES: CPT | Performed by: SOCIAL WORKER

## 2018-08-27 NOTE — PROGRESS NOTES
Date of Service: August 27, 2018  Time In: 3:00 PM  Time Out: 3:58 PM      PROGRESS NOTE  Data:  Thomas Mireles is a 42 y.o. male who met with the undersigned 1:1 for a regularly scheduled individual outpatient therapy session at [facility] for bipolar disorder.     HPI: Patient reports he feels he is relatively stable at this time and reports he is scheduled to complete his degree in [employer] in the next few weeks.  He reports he is feeling better since he is no longer working at a [employer] center and states he is working at a local [employer].  The patient reports depression is minimal at this time and rates current symptoms at a 2 on a scale of 1-10 with 10 being most severe.  He also reports periods of manic symptoms including racing thoughts, impulsive behavior, decreased need for sleep, increased energy, and engaging in activities was a high likelihood of negative outcome including going on spending sprees and engaging in promiscuous behavior.  However, he reports he feels he is relatively stable at this time but continues to struggle with periods of irritability and racing thoughts.  The patient rates current symptoms at a 3 on a scale of 1-10 with 10 being most severe.  The patient reports he continues to adhere to medication regimen as prescribed and states he feels it is helpful.  Patient adamantly convincingly denies suicidal ideation vehemently denies any substance use.      Clinical Maneuvering/Intervention:  Assisted patient in processing above session content; acknowledged and normalized patient’s thoughts, feelings, and concerns.  Utilized motivated techniques including complex pleasure to assist the patient in identifying acknowledging progress he has made and praised him for his effort.  Also discussed the importance of getting adequate sleep and discussed factors of healthy sleep hygiene.  Provided unconditional positive regard in a safe, supportive  environment.    Allowed patient to freely discuss issues without interruption or judgment. Provided safe, confidential environment to facilitate the development of positive therapeutic relationship and encourage open, honest communication. Assisted patient in identifying risk factors which would indicate the need for higher level of care including thoughts to harm self or others and/or self-harming behavior and encouraged patient to contact this office, call 911, or present to the nearest emergency room should any of these events occur. Discussed crisis intervention services and means to access.  Patient adamantly and convincingly denies current suicidal or homicidal ideation or perceptual disturbance.    Assessment    Patient appears to have a long history of bipolar disorder and continues to struggle with intermittent periods of vazquez and several months of significant depression afterward.  Patient's symptomology continues to cause impairment important functioning.  As a result, the patient to be reasonably expected to continue to benefit from treatment and would likely be at increased risk for decompensation in her life.    Diagnoses and all orders for this visit:    Bipolar 1 disorder, mixed (CMS/[facility])               Mental Status Exam  Hygiene:  good  Dress:  casual  Attitude:  Cooperative  Motor Activity:  Appropriate  Speech:  Normal  Mood:  depressed  Affect:  calm and pleasant  Thought Processes:  Goal directed  Thought Content:  normal  Suicidal Thoughts:  denies  Homicidal Thoughts:  denies  Crisis Safety Plan: yes, to come to the emergency room.  Hallucinations:  denies    Patient's Support Network Includes:  parents    Progress toward goal: Not at goal    Functional Status: Moderate impairment     Prognosis: Fair with Ongoing Treatment     Plan         Will continue in individual outpatient therapy session at [facility] in 3 months and pharmacotherapy as scheduled with Flori  LAUREN Augustine.  Patient will adhere to medication regimen as prescribed and report any side effects. Patient will contact this office, call 911 or present to the nearest emergency room should suicidal or homicidal ideations occur. Provide Cognitive Behavioral Therapy and Integrative Therapy to improve functioning, maintain stability, and avoid decompensation and the need for higher level of care.          Return in about 3 months (around 11/27/2018) for Next scheduled follow up.      This document signed by Wes Weiss LCSW, [facility] August 27, 2018 5:27 PM

## 2018-09-11 ENCOUNTER — OFFICE VISIT (OUTPATIENT)
Dept: PSYCHIATRY | Facility: CLINIC | Age: 43
End: 2018-09-11

## 2018-09-11 VITALS
WEIGHT: 233.6 LBS | DIASTOLIC BLOOD PRESSURE: 88 MMHG | SYSTOLIC BLOOD PRESSURE: 144 MMHG | BODY MASS INDEX: 33.52 KG/M2 | HEART RATE: 96 BPM

## 2018-09-11 DIAGNOSIS — F31.62 BIPOLAR 1 DISORDER, MIXED, MODERATE (HCC): ICD-10-CM

## 2018-09-11 DIAGNOSIS — F31.60 BIPOLAR 1 DISORDER, MIXED (HCC): Primary | ICD-10-CM

## 2018-09-11 PROCEDURE — 99213 OFFICE O/P EST LOW 20 MIN: CPT | Performed by: NURSE PRACTITIONER

## 2018-09-11 RX ORDER — QUETIAPINE FUMARATE 25 MG/1
25 TABLET, FILM COATED ORAL NIGHTLY
Qty: 90 TABLET | Refills: 0 | Status: SHIPPED | OUTPATIENT
Start: 2018-09-11 | End: 2019-06-19

## 2018-09-11 NOTE — PROGRESS NOTES
"      Subjective   Thomas Mireles is a 42 y.o. male is here today for medication management follow-up.    Chief Complaint:  \"I am still doing good\"    History of Present Illness:  Patient states he still doing good.  He denies having any hypomania symptoms.  He currently rates depression and anxiety both before out of 10 with 10 being worst.  He denies any suicidal thoughts, homicidal thoughts, or any AV hallucinations.  He states his blood sugars are running stable he is going to have some blood work through his PCP coming up.  He is sleeping approximately 7 hours a night without difficulty.Body mass index is 33.52 kg/m².  He denies any appetite changes.  He denies any new medical stressors.  No negative side effects to the medication.  He is continuing to do well at work.  Patient states his main problem is typically in the winter months.  That is when depression usually increases.  As of right now he states he is fine.    The following portions of the patient's history were reviewed and updated as appropriate: allergies, current medications, past family history, past medical history, past social history, past surgical history and problem list.    Review of Systems   Psychiatric/Behavioral: The patient is nervous/anxious.        Objective   Physical Exam   Constitutional: He is oriented to person, place, and time. He appears well-developed and well-nourished.   HENT:   Head: Normocephalic.   Neurological: He is alert and oriented to person, place, and time.   Psychiatric: He has a normal mood and affect. His speech is normal and behavior is normal. Judgment and thought content normal. Cognition and memory are normal.   Vitals reviewed.    Blood pressure 144/88, pulse 96, weight 106 kg (233 lb 9.6 oz).    Medication List:   Current Outpatient Prescriptions   Medication Sig Dispense Refill   • aspirin 81 MG EC tablet Take 81 mg by mouth Daily.     • butalbital-aspirin-caffeine (FIORINAL) -40 MG per capsule " Take 1 capsule by mouth 2 (Two) Times a Day As Needed for Headache. 40 capsule 0   • cyclobenzaprine (FLEXERIL) 5 MG tablet Take 1 tablet by mouth At Night As Needed for Muscle Spasms. 90 tablet 3   • insulin degludec (TRESIBA FLEXTOUCH) 100 UNIT/ML solution pen-injector injection Inject 40 Units under the skin Daily. 9 mL 3   • Insulin Pen Needle (ULTRA-THIN II PEN NEEDLES) 29G X 12.7MM misc 1 each Daily. 100 each 1   • lisinopril (PRINIVIL,ZESTRIL) 5 MG tablet Take 1 tablet by mouth Daily. 90 tablet 3   • metoprolol tartrate (LOPRESSOR) 25 MG tablet Take 1 tablet by mouth Every 12 (Twelve) Hours. 180 tablet 1   • naproxen (NAPROSYN) 500 MG tablet Take 1 tablet by mouth 2 (Two) Times a Day With Meals. 180 tablet 3   • Omega-3 Fatty Acids (FISH OIL) 1000 MG capsule capsule Take 1 capsule by mouth 2 (Two) Times a Day With Meals. 60 capsule 0   • QUEtiapine (SEROquel) 25 MG tablet Take 1 tablet by mouth Every Night. 90 tablet 0   • topiramate (TOPAMAX) 100 MG tablet Take 1 tablet by mouth 2 (Two) Times a Week. 180 tablet 3     No current facility-administered medications for this visit.        Mental Status Exam:   Hygiene:   good  Cooperation:  Cooperative  Eye Contact:  Good  Psychomotor Behavior:  Appropriate  Affect:  Full range  Hopelessness: Denies  Speech:  Normal  Thought Process:  Goal directed  Thought Content:  Normal  Suicidal:  None  Homicidal:  None  Hallucinations:  None  Delusion:  None  Memory:  Intact  Orientation:  Person, Place, Time and Situation  Reliability:  good  Insight:  Good  Judgement:  Good  Impulse Control:  Good  Physical/Medical Issues:  Yes diabetes, HTn    Assessment/Plan   Problems Addressed this Visit     None      Visit Diagnoses     Bipolar 1 disorder, mixed (CMS/HCC)    -  Primary    Relevant Medications    QUEtiapine (SEROquel) 25 MG tablet    Bipolar 1 disorder, mixed, moderate (CMS/HCC)        Relevant Medications    QUEtiapine (SEROquel) 25 MG tablet          Functionality:  pt having minimal impairment in important areas of daily functioning.  Prognosis: Guarded dependent on medication/follow up and treatment plan compliance.    Discussed medication options.  At this time patient is happy with his current medication regimen.  We are going to continue his same dosing.  I will have him back in 2 months to see how he is doing.  Typically winter is the harder times for him and we will just monitor him closely.  He is to notify me should he began having worsening of symptoms.  He is to continue therapy Reviewed the risks, benefits, and side effects of the medications; patient acknowledged and verbally consented.  Patient is agreeable to call the [facility].  Patient is aware to call 911 or go to the nearest ER should begin having SI/HI.

## 2018-09-26 ENCOUNTER — OFFICE VISIT (OUTPATIENT)
Dept: FAMILY MEDICINE CLINIC | Facility: CLINIC | Age: 43
End: 2018-09-26

## 2018-09-26 VITALS
SYSTOLIC BLOOD PRESSURE: 130 MMHG | BODY MASS INDEX: 34.13 KG/M2 | HEIGHT: 70 IN | DIASTOLIC BLOOD PRESSURE: 80 MMHG | HEART RATE: 98 BPM | OXYGEN SATURATION: 98 % | WEIGHT: 238.4 LBS

## 2018-09-26 DIAGNOSIS — I10 ESSENTIAL HYPERTENSION: Primary | ICD-10-CM

## 2018-09-26 DIAGNOSIS — E11.65 TYPE 2 DIABETES MELLITUS WITH HYPERGLYCEMIA, WITH LONG-TERM CURRENT USE OF INSULIN (HCC): ICD-10-CM

## 2018-09-26 DIAGNOSIS — Z23 IMMUNIZATION DUE: ICD-10-CM

## 2018-09-26 DIAGNOSIS — Z79.4 TYPE 2 DIABETES MELLITUS WITH HYPERGLYCEMIA, WITH LONG-TERM CURRENT USE OF INSULIN (HCC): ICD-10-CM

## 2018-09-26 DIAGNOSIS — F31.81 BIPOLAR II DISORDER (HCC): ICD-10-CM

## 2018-09-26 DIAGNOSIS — S09.90XD INJURY OF HEAD, SUBSEQUENT ENCOUNTER: ICD-10-CM

## 2018-09-26 DIAGNOSIS — E78.2 MIXED HYPERLIPIDEMIA: ICD-10-CM

## 2018-09-26 PROCEDURE — 90471 IMMUNIZATION ADMIN: CPT | Performed by: NURSE PRACTITIONER

## 2018-09-26 PROCEDURE — 90674 CCIIV4 VAC NO PRSV 0.5 ML IM: CPT | Performed by: NURSE PRACTITIONER

## 2018-09-26 PROCEDURE — 99214 OFFICE O/P EST MOD 30 MIN: CPT | Performed by: NURSE PRACTITIONER

## 2019-01-23 ENCOUNTER — OFFICE VISIT (OUTPATIENT)
Dept: FAMILY MEDICINE CLINIC | Facility: CLINIC | Age: 44
End: 2019-01-23

## 2019-01-23 VITALS
HEART RATE: 99 BPM | HEIGHT: 70 IN | SYSTOLIC BLOOD PRESSURE: 150 MMHG | OXYGEN SATURATION: 100 % | BODY MASS INDEX: 35.5 KG/M2 | TEMPERATURE: 99 F | WEIGHT: 248 LBS | DIASTOLIC BLOOD PRESSURE: 100 MMHG

## 2019-01-23 DIAGNOSIS — Z79.4 TYPE 2 DIABETES MELLITUS WITH HYPERGLYCEMIA, WITH LONG-TERM CURRENT USE OF INSULIN (HCC): Primary | ICD-10-CM

## 2019-01-23 DIAGNOSIS — R51.9 CHRONIC DAILY HEADACHE: ICD-10-CM

## 2019-01-23 DIAGNOSIS — F31.81 BIPOLAR II DISORDER (HCC): ICD-10-CM

## 2019-01-23 DIAGNOSIS — E11.65 TYPE 2 DIABETES MELLITUS WITH HYPERGLYCEMIA, WITH LONG-TERM CURRENT USE OF INSULIN (HCC): Primary | ICD-10-CM

## 2019-01-23 DIAGNOSIS — E78.2 MIXED HYPERLIPIDEMIA: ICD-10-CM

## 2019-01-23 DIAGNOSIS — S09.90XD INJURY OF HEAD, SUBSEQUENT ENCOUNTER: ICD-10-CM

## 2019-01-23 DIAGNOSIS — I10 ESSENTIAL HYPERTENSION: ICD-10-CM

## 2019-01-23 PROCEDURE — 99214 OFFICE O/P EST MOD 30 MIN: CPT | Performed by: NURSE PRACTITIONER

## 2019-01-23 RX ORDER — LISINOPRIL 5 MG/1
5 TABLET ORAL DAILY
Qty: 90 TABLET | Refills: 3 | Status: SHIPPED | OUTPATIENT
Start: 2019-01-23 | End: 2019-06-19

## 2019-01-23 RX ORDER — CYCLOBENZAPRINE HCL 5 MG
5 TABLET ORAL NIGHTLY PRN
Qty: 90 TABLET | Refills: 3 | Status: SHIPPED | OUTPATIENT
Start: 2019-01-23 | End: 2020-07-15 | Stop reason: SDUPTHER

## 2019-01-23 RX ORDER — NAPROXEN 500 MG/1
500 TABLET ORAL 2 TIMES DAILY WITH MEALS
Qty: 180 TABLET | Refills: 3 | Status: SHIPPED | OUTPATIENT
Start: 2019-01-23 | End: 2020-07-15 | Stop reason: SDUPTHER

## 2019-01-23 RX ORDER — TOPIRAMATE 100 MG/1
100 TABLET, FILM COATED ORAL 2 TIMES WEEKLY
Qty: 180 TABLET | Refills: 3 | Status: SHIPPED | OUTPATIENT
Start: 2019-01-24 | End: 2019-02-05 | Stop reason: SDUPTHER

## 2019-01-27 NOTE — PROGRESS NOTES
Subjective   Thomas Mireles is a 43 y.o. male.   Chief Compliant: The patient presents with Hypertension and Follow-up    Returns today after several months without insurance and not taking any medication.  Now has a new insurance and ready to restart his medication.          Hypertension   This is a chronic (Feels he is doing a little better or actually no worse ) problem. The current episode started more than 1 year ago. The problem has been waxing and waning (Denies any new concerns or symptoms.  ) since onset. Associated symptoms include headaches. Pertinent negatives include no anxiety, blurred vision, chest pain, malaise/fatigue, neck pain, orthopnea, palpitations, peripheral edema, PND, shortness of breath or sweats. Risk factors for coronary artery disease include family history, obesity and smoking/tobacco exposure. Current antihypertension treatment includes ACE inhibitors. The current treatment provides moderate improvement. Compliance problems include exercise, psychosocial issues, medication side effects, medication cost and diet.    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  Since now has a daily headache can pinpoint the direct point of pain daily.   no changes) problem. The current episode started more than 1 year ago. The problem occurs daily. The problem has been unchanged. The pain is located in the parietal region. The pain does not radiate. The pain quality is similar to prior headaches. The quality of the pain is described as aching, dull and throbbing. The pain is at a severity of 4/10. The pain is moderate. Associated symptoms include scalp tenderness. Pertinent negatives include no abnormal behavior, back pain, blurred vision, dizziness, drainage, ear pain, eye redness, eye watering, facial sweating, hearing loss, loss of balance, muscle aches, neck pain, phonophobia, photophobia,  seizures, sinus pressure, tingling, tinnitus or weight loss. Associated symptoms comments: Pain is described as sharp quick buzz or sting in the back of his head.  Patient can pinpoint the direct area.  Pain is aggravating every aspect of his life and further worsening his anxiety and depression   . Nothing aggravates the symptoms. He has tried acetaminophen, Excedrin and NSAIDs (Seen chiropractor and neurology with multiple medication trials no changes) for the symptoms. The treatment provided mild relief. His past medical history is significant for hypertension.   Diabetes   He presents for his follow-up diabetic visit. He has type 2 diabetes mellitus. His disease course has been stable (States he feels ok admits he isnt as compliant as he would actually like to be.  States the daily headache distracts him and leads a downward decline of his health habits,  Admits to good and bad days ). Hypoglycemia symptoms include headaches, mood changes (with known bipolar) and nervousness/anxiousness. Pertinent negatives for hypoglycemia include no confusion, dizziness, hunger, pallor, seizures, sleepiness, speech difficulty, sweats or tremors. There are no diabetic associated symptoms. Pertinent negatives for diabetes include no blurred vision, no chest pain, no foot paresthesias, no foot ulcerations, no polydipsia, no polyphagia, no polyuria and no weight loss. There are no hypoglycemic complications. Symptoms are stable. There are no diabetic complications. Risk factors for coronary artery disease include dyslipidemia, family history, obesity, male sex, hypertension, sedentary lifestyle and stress. Current diabetic treatment includes insulin injections and oral agent (dual therapy). Compliance with diabetes treatment: states he is taking his medications everyday  His weight is stable. He is following a generally healthy diet. Meal planning includes avoidance of concentrated sweets. He never participates in exercise. Home  blood sugar record trend: honest to report he is not monitoring routinely. An ACE inhibitor/angiotensin II receptor blocker is being taken. He does not see a podiatrist.Eye exam is current.   Depression   Visit Type: follow-up (Feels his symptoms are stable)  Patient presents with the following symptoms: decreased concentration, depressed mood, dizziness, excessive worry, malaise, muscle tension and nervousness/anxiety.  Patient is not experiencing: confusion, palpitations, shortness of breath, suicidal ideas, suicidal planning, thoughts of death and weight loss.  Frequency of symptoms: constantly   Severity: interfering with daily activities   Sleep quality: fair  Side effects:  Headaches     The following portions of the patient's history were reviewed and updated as appropriate: allergies, current medications, past family history, past medical history, past social history, past surgical history and problem list.      Review of Systems   Constitutional: Negative.  Negative for activity change, malaise/fatigue and weight loss.   HENT: Negative for ear pain, hearing loss, sinus pressure and tinnitus.    Eyes: Negative for blurred vision, photophobia and redness.   Respiratory: Negative.  Negative for shortness of breath.    Cardiovascular: Negative.  Negative for chest pain, palpitations, orthopnea and PND.   Endocrine: Negative for polydipsia, polyphagia and polyuria.   Genitourinary: Negative.    Musculoskeletal: Negative for back pain and neck pain.   Skin: Negative for pallor.   Neurological: Positive for headaches. Negative for dizziness, tingling, tremors, seizures, syncope, facial asymmetry, speech difficulty, light-headedness and loss of balance.   Psychiatric/Behavioral: Positive for agitation, behavioral problems and decreased concentration. Negative for confusion and suicidal ideas. The patient is nervous/anxious.    All other systems reviewed and are negative.      Procedures    Vitals: Blood pressure  "150/100, pulse 99, temperature 99 °F (37.2 °C), temperature source Oral, height 177.8 cm (70\"), weight 112 kg (248 lb), SpO2 100 %.     Allergies:   Allergies   Allergen Reactions   • Statins Other (See Comments)     All over pain,           Objective   Physical Exam   Constitutional: He is oriented to person, place, and time. He appears well-developed and well-nourished. No distress.   HENT:   Head: Normocephalic.   Right Ear: Hearing, tympanic membrane, external ear and ear canal normal.   Left Ear: Hearing, tympanic membrane, external ear and ear canal normal.   Nose: Nose normal. No mucosal edema or rhinorrhea. Right sinus exhibits no maxillary sinus tenderness and no frontal sinus tenderness. Left sinus exhibits no maxillary sinus tenderness and no frontal sinus tenderness.   Mouth/Throat: Uvula is midline, oropharynx is clear and moist and mucous membranes are normal. No oropharyngeal exudate. Tonsils are 0 on the right. Tonsils are 0 on the left. No tonsillar exudate.   Scalp tenderness with light palpitations     Eyes: Conjunctivae are normal. Pupils are equal, round, and reactive to light. Right eye exhibits no discharge. Left eye exhibits no discharge.   Neck: Neck supple. No thyromegaly present.   Cardiovascular: Normal rate, regular rhythm and normal heart sounds.   No murmur heard.  Pulmonary/Chest: Effort normal and breath sounds normal.   Lymphadenopathy:     He has no cervical adenopathy.   Neurological: He is alert and oriented to person, place, and time. He has normal reflexes. He displays normal reflexes. No cranial nerve deficit. Coordination normal.   Skin: Skin is warm and dry. No rash noted. He is not diaphoretic. No erythema.   Around nose, hair line and mouth wit inflamed macular papular lesion        Psychiatric: He has a normal mood and affect. His behavior is normal. Judgment and thought content normal.   Nursing note and vitals reviewed.      Assessment/Plan   Discussed with patient " impression and plan, patient verbalizes understanding.      Encouraged continued compliancy with medications.    Will RTC for fasting labs in the am        Thomas was seen today for hypertension and follow-up.    Diagnoses and all orders for this visit:    Type 2 diabetes mellitus with hyperglycemia, with long-term current use of insulin (CMS/[facility])  -     Ambulatory Referral to Psychiatry  -     CBC & Differential  -     Comprehensive Metabolic Panel  -     Hemoglobin A1c  -     MicroAlbumin, Urine, Random - Urine, Clean Catch  -     Vitamin B12  -     TSH    Essential hypertension  -     metoprolol tartrate (LOPRESSOR) 25 MG tablet; Take 1 tablet by mouth Every 12 (Twelve) Hours.    Mixed hyperlipidemia    Bipolar II disorder (CMS/[facility])    Injury of head, subsequent encounter    Chronic daily headache    Other orders  -     naproxen (NAPROSYN) 500 MG tablet; Take 1 tablet by mouth 2 (Two) Times a Day With Meals.  -     lisinopril (PRINIVIL,ZESTRIL) 5 MG tablet; Take 1 tablet by mouth Daily.  -     Cancel: insulin degludec (TRESIBA FLEXTOUCH) 100 UNIT/ML solution pen-injector injection; Inject 40 Units under the skin into the appropriate area as directed Daily.  -     Cancel: Insulin Pen Needle (ULTRA-THIN II PEN NEEDLES) 29G X 12.7MM misc; 1 each Daily.  -     cyclobenzaprine (FLEXERIL) 5 MG tablet; Take 1 tablet by mouth At Night As Needed for Muscle Spasms.  -     topiramate (TOPAMAX) 100 MG tablet; Take 1 tablet by mouth 2 (Two) Times a Week.  -     exenatide er (BYDUREON) 2 MG pen-injector injection; Inject 1 pen under the skin into the appropriate area as directed 1 (One) Time Per Week.        Patient is a non smoker    Patient's Body mass index is 35.58 kg/m². BMI is above normal parameters. Recommendations include: exercise counseling and nutrition counseling.

## 2019-01-29 ENCOUNTER — TELEPHONE (OUTPATIENT)
Dept: FAMILY MEDICINE CLINIC | Facility: CLINIC | Age: 44
End: 2019-01-29

## 2019-01-29 NOTE — TELEPHONE ENCOUNTER
PA submitted for Trulicity via CoverMyMeds. (Bydureon wasn't covered and Trulicity was suggested alternative.

## 2019-02-05 RX ORDER — TOPIRAMATE 100 MG/1
100 TABLET, FILM COATED ORAL 2 TIMES DAILY
Qty: 180 TABLET | Refills: 3 | Status: SHIPPED | OUTPATIENT
Start: 2019-02-05 | End: 2020-07-15 | Stop reason: SDUPTHER

## 2019-02-05 NOTE — TELEPHONE ENCOUNTER
Pharmacy called Topamax was sent as 2 times weekly #180 ?    Cant verbally correct has to be resent, corrected, pended.

## 2019-02-26 RX ORDER — DULAGLUTIDE 0.75 MG/.5ML
INJECTION, SOLUTION SUBCUTANEOUS
Qty: 2 ML | Refills: 3 | Status: SHIPPED | OUTPATIENT
Start: 2019-02-26 | End: 2019-06-02 | Stop reason: SDUPTHER

## 2019-06-03 RX ORDER — DULAGLUTIDE 0.75 MG/.5ML
INJECTION, SOLUTION SUBCUTANEOUS
Qty: 2 ML | Refills: 0 | Status: SHIPPED | OUTPATIENT
Start: 2019-06-03 | End: 2020-07-15 | Stop reason: SDUPTHER

## 2019-06-19 ENCOUNTER — RESULTS ENCOUNTER (OUTPATIENT)
Dept: FAMILY MEDICINE CLINIC | Facility: CLINIC | Age: 44
End: 2019-06-19

## 2019-06-19 ENCOUNTER — OFFICE VISIT (OUTPATIENT)
Dept: FAMILY MEDICINE CLINIC | Facility: CLINIC | Age: 44
End: 2019-06-19

## 2019-06-19 VITALS
SYSTOLIC BLOOD PRESSURE: 138 MMHG | OXYGEN SATURATION: 99 % | TEMPERATURE: 98.6 F | BODY MASS INDEX: 36.97 KG/M2 | HEIGHT: 70 IN | WEIGHT: 258.2 LBS | HEART RATE: 91 BPM | DIASTOLIC BLOOD PRESSURE: 90 MMHG

## 2019-06-19 DIAGNOSIS — E78.2 MIXED HYPERLIPIDEMIA: ICD-10-CM

## 2019-06-19 DIAGNOSIS — E11.65 TYPE 2 DIABETES MELLITUS WITH HYPERGLYCEMIA, WITH LONG-TERM CURRENT USE OF INSULIN (HCC): ICD-10-CM

## 2019-06-19 DIAGNOSIS — Z79.4 TYPE 2 DIABETES MELLITUS WITH HYPERGLYCEMIA, WITH LONG-TERM CURRENT USE OF INSULIN (HCC): ICD-10-CM

## 2019-06-19 DIAGNOSIS — S09.90XD INJURY OF HEAD, SUBSEQUENT ENCOUNTER: ICD-10-CM

## 2019-06-19 DIAGNOSIS — I10 ESSENTIAL HYPERTENSION: ICD-10-CM

## 2019-06-19 DIAGNOSIS — F31.81 BIPOLAR II DISORDER (HCC): ICD-10-CM

## 2019-06-19 DIAGNOSIS — F31.81 BIPOLAR II DISORDER (HCC): Primary | ICD-10-CM

## 2019-06-19 PROCEDURE — 99214 OFFICE O/P EST MOD 30 MIN: CPT | Performed by: NURSE PRACTITIONER

## 2019-06-19 NOTE — PROGRESS NOTES
"Subjective   Thomas Mireles is a 43 y.o. male.   Chief Compliant: The patient presents with Medication Problem (issues \"functioning\"; possible swab test) and Follow-up    Returns today after several months without any medication.  Decided to stop medications due to multiple side effects.  Now with new job and difficulty concentrating and performing his duties.            Hypertension   This is a chronic problem. The current episode started more than 1 year ago. The problem has been waxing and waning (Denies any new concerns or symptoms.  ) since onset. The problem is uncontrolled. Associated symptoms include headaches. Pertinent negatives include no anxiety, blurred vision, chest pain, malaise/fatigue, neck pain, orthopnea, palpitations, peripheral edema, PND, shortness of breath or sweats. Risk factors for coronary artery disease include family history, obesity and smoking/tobacco exposure. Current antihypertension treatment includes ACE inhibitors. The current treatment provides moderate improvement. Compliance problems include exercise, psychosocial issues, medication side effects, medication cost and diet.    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  Since now has a daily headache can pinpoint the direct point of pain daily.   Increased headaches without meds.  ) problem. The current episode started more than 1 year ago. The problem occurs daily. The problem has been unchanged. The pain is located in the parietal region. The pain does not radiate. The pain quality is similar to prior headaches. The quality of the pain is described as aching, dull and throbbing. The pain is at a severity of 4/10. The pain is moderate. Associated symptoms include scalp tenderness. Pertinent negatives include no abnormal behavior, back pain, blurred vision, dizziness, drainage, ear pain, eye redness, eye watering, facial " sweating, hearing loss, loss of balance, muscle aches, neck pain, phonophobia, photophobia, seizures, sinus pressure, tingling, tinnitus, visual change, weakness or weight loss. Associated symptoms comments: Pain is described as sharp quick buzz or sting in the back of his head.  Patient can pinpoint the direct area.  Pain is aggravating every aspect of his life and further worsening his anxiety and depression   . Nothing aggravates the symptoms. He has tried acetaminophen, Excedrin and NSAIDs (Seen chiropractor and neurology with multiple medication trials no changes) for the symptoms. The treatment provided mild relief. His past medical history is significant for hypertension.   Diabetes   He presents for his follow-up diabetic visit. He has type 2 diabetes mellitus. His disease course has been stable (States he feels ok and admits he has not been monitoring his blood sugar because he was not taking any of his medications). Hypoglycemia symptoms include headaches, mood changes (with known bipolar) and nervousness/anxiousness. Pertinent negatives for hypoglycemia include no confusion, dizziness, hunger, pallor, seizures, sleepiness, speech difficulty, sweats or tremors. There are no diabetic associated symptoms. Pertinent negatives for diabetes include no blurred vision, no chest pain, no fatigue, no foot paresthesias, no foot ulcerations, no polydipsia, no polyphagia, no polyuria, no visual change, no weakness and no weight loss. There are no hypoglycemic complications. Symptoms are stable. There are no diabetic complications. Risk factors for coronary artery disease include dyslipidemia, family history, obesity, male sex, hypertension, sedentary lifestyle and stress. Current diabetic treatment includes insulin injections and oral agent (dual therapy). His weight is stable. He is following a generally unhealthy (states he has no appetite) diet. He never participates in exercise. Home blood sugar record trend:  honest to report he is not monitoring routinely. An ACE inhibitor/angiotensin II receptor blocker is not being taken. He does not see a podiatrist.Eye exam is not current.   Depression   Visit Type: follow-up (Long history of Bipolar on and off medications.  Seen multiple therapist in the past and never really continued any regimen)  Patient presents with the following symptoms: decreased concentration, depressed mood, dizziness, excessive worry, malaise, muscle tension, nervousness/anxiety and suicidal ideas (states he has thoughts daily but denies any intent or plan.  States he has considered death since a young child.  Lives iwth his parents and states he will not intentionally hurt himself until both parents are  as well).  Patient is not experiencing: confusion, palpitations, shortness of breath, suicidal planning, thoughts of death and weight loss.  Frequency of symptoms: constantly   Severity: interfering with daily activities   Sleep quality: fair  Nighttime awakenings: occasional  Compliance with medications:  0-25%  Side effects:  Headaches and weight gain     The following portions of the patient's history were reviewed and updated as appropriate: allergies, current medications, past family history, past medical history, past social history, past surgical history and problem list.      Review of Systems   Constitutional: Negative.  Negative for activity change, fatigue, malaise/fatigue and weight loss.   HENT: Negative for ear pain, hearing loss, sinus pressure and tinnitus.    Eyes: Negative for blurred vision, photophobia and redness.   Respiratory: Negative.  Negative for shortness of breath.    Cardiovascular: Negative.  Negative for chest pain, palpitations, orthopnea and PND.   Endocrine: Negative for polydipsia, polyphagia and polyuria.   Genitourinary: Negative.    Musculoskeletal: Negative for back pain and neck pain.   Skin: Negative for pallor.   Neurological: Positive for headaches.  "Negative for dizziness, tingling, tremors, seizures, syncope, facial asymmetry, speech difficulty, weakness, light-headedness and loss of balance.   Psychiatric/Behavioral: Positive for agitation, behavioral problems, decreased concentration and suicidal ideas (states he has thoughts daily but denies any intent or plan.  States he has considered death since a young child.  Lives iwth his parents and states he will not intentionally hurt himself until both parents are  as well). Negative for confusion, self-injury and sleep disturbance. The patient is nervous/anxious.    All other systems reviewed and are negative.      Procedures    Vitals: Blood pressure 138/90, pulse 91, temperature 98.6 °F (37 °C), temperature source Oral, height 177.8 cm (70\"), weight 117 kg (258 lb 3.2 oz), SpO2 99 %.     Allergies:   Allergies   Allergen Reactions   • Statins Other (See Comments)     All over pain,           Objective   Physical Exam   Constitutional: He is oriented to person, place, and time. He appears well-developed and well-nourished. No distress.   HENT:   Head: Normocephalic.   Right Ear: Hearing, tympanic membrane, external ear and ear canal normal.   Left Ear: Hearing, tympanic membrane, external ear and ear canal normal.   Nose: Nose normal. No mucosal edema or rhinorrhea. Right sinus exhibits no maxillary sinus tenderness and no frontal sinus tenderness. Left sinus exhibits no maxillary sinus tenderness and no frontal sinus tenderness.   Mouth/Throat: Uvula is midline, oropharynx is clear and moist and mucous membranes are normal. No oropharyngeal exudate. Tonsils are 0 on the right. Tonsils are 0 on the left. No tonsillar exudate.   Scalp tenderness with light palpitations     Eyes: Conjunctivae are normal. Pupils are equal, round, and reactive to light. Right eye exhibits no discharge. Left eye exhibits no discharge.   Neck: Neck supple. No thyromegaly present.   Cardiovascular: Normal rate, regular rhythm " and normal heart sounds.   No murmur heard.  Pulmonary/Chest: Effort normal and breath sounds normal.   Musculoskeletal:        Cervical back: Normal.   Lymphadenopathy:     He has no cervical adenopathy.   Neurological: He is alert and oriented to person, place, and time. He has normal reflexes. He displays normal reflexes. No cranial nerve deficit. Coordination normal.   Skin: Skin is warm and dry. No rash noted. He is not diaphoretic. No erythema.   Around nose, hair line and mouth wit inflamed macular papular lesion        Psychiatric: He has a normal mood and affect. His behavior is normal. Judgment and thought content normal.   Nursing note and vitals reviewed.      Assessment/Plan   Discussed with patient impression and plan, patient verbalizes understanding.      Encouraged continued compliancy with medications.    Will RTC for fasting labs in the am        Thomas was seen today for medication problem and follow-up.    Diagnoses and all orders for this visit:    Bipolar II disorder (CMS/[facility])  -     Vitamin D 25 Hydroxy; Future  Will restart Topamax as he feels he did best when on this medication following head injury     Injury of head, subsequent encounter  Restart topamax    Essential hypertension  -     Comprehensive Metabolic Panel; Future  -     Lipid Panel; Future  -     Magnesium; Future  Will restart Metoprolol at 12.5 bid and discontinue lisinopril die to cough    Mixed hyperlipidemia  -     Comprehensive Metabolic Panel; Future  -     Lipid Panel; Future    Type 2 diabetes mellitus with hyperglycemia, with long-term current use of insulin (CMS/[facility])  -     Comprehensive Metabolic Panel; Future  -     Hemoglobin A1c; Future  -     Lipid Panel; Future  -     Magnesium; Future  -     TSH; Future  -     Vitamin B12; Future  -     Vitamin D 25 Hydroxy; Future    Encouraged compliance with diet for now and will re introduce med at follow up       Patient is a non smoker    Patient's Body mass index is  37.05 kg/m². BMI is above normal parameters. Recommendations include: exercise counseling and nutrition counseling.

## 2019-07-08 ENCOUNTER — LAB (OUTPATIENT)
Dept: FAMILY MEDICINE CLINIC | Facility: CLINIC | Age: 44
End: 2019-07-08

## 2019-07-08 DIAGNOSIS — R53.83 FATIGUE, UNSPECIFIED TYPE: ICD-10-CM

## 2019-07-08 DIAGNOSIS — E78.5 HYPERLIPIDEMIA, UNSPECIFIED HYPERLIPIDEMIA TYPE: ICD-10-CM

## 2019-07-08 DIAGNOSIS — Z79.4 TYPE 2 DIABETES MELLITUS WITH HYPERGLYCEMIA, WITH LONG-TERM CURRENT USE OF INSULIN (HCC): Primary | ICD-10-CM

## 2019-07-08 DIAGNOSIS — E11.65 TYPE 2 DIABETES MELLITUS WITH HYPERGLYCEMIA, WITH LONG-TERM CURRENT USE OF INSULIN (HCC): Primary | ICD-10-CM

## 2019-07-08 DIAGNOSIS — I10 HYPERTENSION, UNSPECIFIED TYPE: ICD-10-CM

## 2019-07-08 DIAGNOSIS — Z00.00 HEALTHCARE MAINTENANCE: ICD-10-CM

## 2019-07-09 LAB
25(OH)D3+25(OH)D2 SERPL-MCNC: 15.8 NG/ML (ref 30–100)
ALBUMIN SERPL-MCNC: 4.3 G/DL (ref 3.5–5.2)
ALBUMIN/GLOB SERPL: 1.6 G/DL
ALP SERPL-CCNC: 87 U/L (ref 39–117)
ALT SERPL-CCNC: 18 U/L (ref 1–41)
AST SERPL-CCNC: 17 U/L (ref 1–40)
BILIRUB SERPL-MCNC: 1.2 MG/DL (ref 0.2–1.2)
BUN SERPL-MCNC: 10 MG/DL (ref 6–20)
BUN/CREAT SERPL: 15.4 (ref 7–25)
CALCIUM SERPL-MCNC: 9.5 MG/DL (ref 8.6–10.5)
CHLORIDE SERPL-SCNC: 100 MMOL/L (ref 98–107)
CHOLEST SERPL-MCNC: 190 MG/DL (ref 0–200)
CO2 SERPL-SCNC: 28.6 MMOL/L (ref 22–29)
CREAT SERPL-MCNC: 0.65 MG/DL (ref 0.76–1.27)
GLOBULIN SER CALC-MCNC: 2.7 GM/DL
GLUCOSE SERPL-MCNC: 188 MG/DL (ref 65–99)
HBA1C MFR BLD: 8.5 % (ref 4.8–5.6)
HDLC SERPL-MCNC: 34 MG/DL (ref 40–60)
INTERPRETATION: NORMAL
LDLC SERPL CALC-MCNC: 109 MG/DL (ref 0–100)
MAGNESIUM SERPL-MCNC: 2.2 MG/DL (ref 1.6–2.6)
POTASSIUM SERPL-SCNC: 4.3 MMOL/L (ref 3.5–5.2)
PROT SERPL-MCNC: 7 G/DL (ref 6–8.5)
SODIUM SERPL-SCNC: 139 MMOL/L (ref 136–145)
TRIGL SERPL-MCNC: 235 MG/DL (ref 0–150)
TSH SERPL DL<=0.005 MIU/L-ACNC: 1.02 UIU/ML (ref 0.45–4.5)
VIT B12 SERPL-MCNC: 259 PG/ML (ref 211–946)
VLDLC SERPL CALC-MCNC: 47 MG/DL

## 2019-07-10 ENCOUNTER — TELEPHONE (OUTPATIENT)
Dept: FAMILY MEDICINE CLINIC | Facility: CLINIC | Age: 44
End: 2019-07-10

## 2019-07-10 NOTE — TELEPHONE ENCOUNTER
----- Message from LAUREN Majano sent at 7/9/2019  5:05 PM EDT -----  Needs to schedule a follow up to discuss restart of medication.  Labs are not as bad as in the past       Left a message to return call.      Left a second message to return call.    Left a third message to return call.      Letter mailed.

## 2019-07-23 ENCOUNTER — OFFICE VISIT (OUTPATIENT)
Dept: FAMILY MEDICINE CLINIC | Facility: CLINIC | Age: 44
End: 2019-07-23

## 2019-07-23 VITALS
HEIGHT: 70 IN | DIASTOLIC BLOOD PRESSURE: 78 MMHG | WEIGHT: 255.6 LBS | SYSTOLIC BLOOD PRESSURE: 120 MMHG | HEART RATE: 95 BPM | OXYGEN SATURATION: 99 % | BODY MASS INDEX: 36.59 KG/M2 | TEMPERATURE: 99.9 F

## 2019-07-23 DIAGNOSIS — F31.81 BIPOLAR II DISORDER (HCC): Primary | ICD-10-CM

## 2019-07-23 DIAGNOSIS — Z79.4 TYPE 2 DIABETES MELLITUS WITH HYPERGLYCEMIA, WITH LONG-TERM CURRENT USE OF INSULIN (HCC): ICD-10-CM

## 2019-07-23 DIAGNOSIS — I10 HYPERTENSION, UNSPECIFIED TYPE: ICD-10-CM

## 2019-07-23 DIAGNOSIS — E78.5 HYPERLIPIDEMIA, UNSPECIFIED HYPERLIPIDEMIA TYPE: ICD-10-CM

## 2019-07-23 DIAGNOSIS — S09.90XD INJURY OF HEAD, SUBSEQUENT ENCOUNTER: ICD-10-CM

## 2019-07-23 DIAGNOSIS — R51.9 CHRONIC DAILY HEADACHE: ICD-10-CM

## 2019-07-23 DIAGNOSIS — E11.65 TYPE 2 DIABETES MELLITUS WITH HYPERGLYCEMIA, WITH LONG-TERM CURRENT USE OF INSULIN (HCC): ICD-10-CM

## 2019-07-23 PROCEDURE — 99214 OFFICE O/P EST MOD 30 MIN: CPT | Performed by: NURSE PRACTITIONER

## 2019-07-23 NOTE — PROGRESS NOTES
Subjective   Thomas Mireles is a 43 y.o. male.   Chief Compliant: The patient presents with Follow-up and Diabetes    Returns today after recent restart of medications.  Admits he is taking meds hit an miss.  Trying to do some better.  Patient states he realizes that he is going to always fluctuate with caring for himself.  Feels he is doing a little better overall at present        Hypertension   This is a chronic problem. The current episode started more than 1 year ago. The problem has been waxing and waning (Denies any new concerns or symptoms.  ) since onset. The problem is controlled. Associated symptoms include headaches (chronic headache). Pertinent negatives include no anxiety, blurred vision, chest pain, malaise/fatigue, neck pain, orthopnea, palpitations, peripheral edema, PND, shortness of breath or sweats. Risk factors for coronary artery disease include family history, obesity and smoking/tobacco exposure. Current antihypertension treatment includes ACE inhibitors. The current treatment provides moderate improvement. Compliance problems include exercise, psychosocial issues, medication side effects, medication cost and diet.    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  Since now has a daily headache can pinpoint the direct point of pain daily.  Slight improvement with restart of meds ) problem. The current episode started more than 1 year ago. The problem occurs daily. The problem has been unchanged. The pain is located in the parietal region. The pain does not radiate. The pain quality is similar to prior headaches. The quality of the pain is described as aching, dull and throbbing. The pain is at a severity of 4/10. The pain is moderate. Associated symptoms include scalp tenderness. Pertinent negatives include no abnormal behavior, back pain, blurred vision, dizziness, drainage, ear pain, eye  redness, eye watering, facial sweating, hearing loss, loss of balance, muscle aches, neck pain, phonophobia, photophobia, seizures, sinus pressure, tingling, tinnitus, visual change, weakness or weight loss. Associated symptoms comments: Pain is described as sharp quick buzz or sting in the back of his head.  Patient can pinpoint the direct area.  Pain is aggravating every aspect of his life and further worsening his anxiety and depression   . Nothing aggravates the symptoms. He has tried acetaminophen, Excedrin and NSAIDs (Seen chiropractor and neurology with multiple medication trials no changes) for the symptoms. The treatment provided mild relief. His past medical history is significant for hypertension.   Diabetes   He presents for his follow-up diabetic visit. He has type 2 diabetes mellitus. His disease course has been improving. Hypoglycemia symptoms include headaches (chronic headache), mood changes (with known bipolar) and nervousness/anxiousness. Pertinent negatives for hypoglycemia include no confusion, dizziness, hunger, pallor, seizures, sleepiness, speech difficulty, sweats or tremors. There are no diabetic associated symptoms. Pertinent negatives for diabetes include no blurred vision, no chest pain, no fatigue, no foot paresthesias, no foot ulcerations, no polydipsia, no polyphagia, no polyuria, no visual change, no weakness and no weight loss. There are no hypoglycemic complications. Symptoms are stable. There are no diabetic complications. Risk factors for coronary artery disease include dyslipidemia, family history, obesity, male sex, hypertension, sedentary lifestyle and stress. Current diabetic treatment includes insulin injections and oral agent (dual therapy). His weight is stable. He is following a generally unhealthy (Trying to do better with diet) diet. He never participates in exercise. Home blood sugar record trend: honest to report he is not monitoring routinely. His breakfast blood  glucose range is generally 140-180 mg/dl. An ACE inhibitor/angiotensin II receptor blocker is not being taken. He does not see a podiatrist.Eye exam is not current.   Depression   Visit Type: follow-up (Long history of Bipolar on and off medications.  Seen multiple therapist in the past and never really continued any regimen.  Feels he is stable with no changes)  Patient presents with the following symptoms: decreased concentration, depressed mood, dizziness, excessive worry, malaise, muscle tension, nervousness/anxiety and suicidal ideas (states he has thoughts daily but denies any intent or plan.  States he has considered death since a young child.  Lives iwth his parents and states he will not intentionally hurt himself until both parents are  as well).  Patient is not experiencing: confusion, palpitations, shortness of breath, suicidal planning, thoughts of death and weight loss.  Frequency of symptoms: most days   Severity: interfering with daily activities   Sleep quality: fair  Nighttime awakenings: occasional  Compliance with medications:  0-25%  Side effects:  Headaches and weight gain     The following portions of the patient's history were reviewed and updated as appropriate: allergies, current medications, past family history, past medical history, past social history, past surgical history and problem list.      Review of Systems   Constitutional: Negative.  Negative for activity change, fatigue, malaise/fatigue and weight loss.   HENT: Negative for ear pain, hearing loss, sinus pressure and tinnitus.    Eyes: Negative for blurred vision, photophobia and redness.   Respiratory: Negative.  Negative for shortness of breath.    Cardiovascular: Negative.  Negative for chest pain, palpitations, orthopnea and PND.   Endocrine: Negative for polydipsia, polyphagia and polyuria.   Genitourinary: Negative.    Musculoskeletal: Negative for back pain and neck pain.   Skin: Negative for pallor.  "  Neurological: Positive for headaches (chronic headache). Negative for dizziness, tingling, tremors, seizures, syncope, facial asymmetry, speech difficulty, weakness, light-headedness and loss of balance.   Psychiatric/Behavioral: Positive for agitation, behavioral problems, decreased concentration and suicidal ideas (states he has thoughts daily but denies any intent or plan.  States he has considered death since a young child.  Lives iwth his parents and states he will not intentionally hurt himself until both parents are  as well). Negative for confusion, self-injury and sleep disturbance. The patient is nervous/anxious.    All other systems reviewed and are negative.      Procedures    Vitals: Blood pressure 120/78, pulse 95, temperature 99.9 °F (37.7 °C), temperature source Oral, height 177.8 cm (70\"), weight 116 kg (255 lb 9.6 oz), SpO2 99 %.     Allergies:   Allergies   Allergen Reactions   • Statins Other (See Comments)     All over pain,           Objective   Physical Exam   Constitutional: He is oriented to person, place, and time. He appears well-developed and well-nourished. No distress.   HENT:   Head: Normocephalic and atraumatic.   Right Ear: Hearing, tympanic membrane, external ear and ear canal normal.   Left Ear: Hearing, tympanic membrane, external ear and ear canal normal.   Nose: Nose normal. No mucosal edema or rhinorrhea. Right sinus exhibits no maxillary sinus tenderness and no frontal sinus tenderness. Left sinus exhibits no maxillary sinus tenderness and no frontal sinus tenderness.   Mouth/Throat: Uvula is midline, oropharynx is clear and moist and mucous membranes are normal. No oropharyngeal exudate. Tonsils are 0 on the right. Tonsils are 0 on the left. No tonsillar exudate.   Scalp tenderness with light palpitations     Eyes: Conjunctivae are normal. Pupils are equal, round, and reactive to light. Right eye exhibits no discharge. Left eye exhibits no discharge.   Neck: Neck " supple. No thyromegaly present.   Cardiovascular: Normal rate, regular rhythm and normal heart sounds.   No murmur heard.  Pulmonary/Chest: Effort normal and breath sounds normal.   Musculoskeletal:        Cervical back: Normal.   Lymphadenopathy:     He has no cervical adenopathy.   Neurological: He is alert and oriented to person, place, and time. He has normal reflexes. He displays normal reflexes. No cranial nerve deficit or sensory deficit. He exhibits normal muscle tone. Coordination normal.   Skin: Skin is warm and dry. No rash noted. He is not diaphoretic. No erythema.          Psychiatric: He has a normal mood and affect. His behavior is normal. Judgment and thought content normal.   Nursing note and vitals reviewed.      Assessment/Plan   Discussed with patient impression and plan, patient verbalizes understanding.        During this visit the following were done:  Labs Reviewed [x]    Labs Ordered []    Radiology Reports Reviewed []    Radiology Ordered []    PCP Records Reviewed []    Referring Provider Records Reviewed []    ER Records Reviewed []    Hospital Records Reviewed []    History Obtained From Family []    Radiology Images Reviewed []    Other Reviewed []    Records Requested []            Thomas was seen today for medication problem and follow-up.    Diagnoses and all orders for this visit:    Bipolar II disorder (CMS/[facility])  Continue Topamax     Injury of head, subsequent encounter  Chronic daily headache  Continue topamax    Essential hypertension  Continue Metoprolol at 12.5 bid     Mixed hyperlipidemia  Declines statin will improve diet and exercise    Type 2 diabetes mellitus with hyperglycemia, with long-term current use of insulin (CMS/[facility])  Continue with Trulicity weekly        Patient is a non smoker    Patient's Body mass index is 36.67 kg/m². BMI is above normal parameters. Recommendations include: exercise counseling and nutrition counseling.

## 2019-12-08 DIAGNOSIS — I10 ESSENTIAL HYPERTENSION: ICD-10-CM

## 2020-02-03 RX ORDER — TOPIRAMATE 100 MG/1
TABLET, FILM COATED ORAL
Qty: 180 TABLET | Refills: 3 | OUTPATIENT
Start: 2020-02-03

## 2020-02-03 RX ORDER — NAPROXEN 500 MG/1
TABLET ORAL
Qty: 180 TABLET | Refills: 3 | OUTPATIENT
Start: 2020-02-03

## 2020-03-06 DIAGNOSIS — I10 ESSENTIAL HYPERTENSION: ICD-10-CM

## 2020-04-03 RX ORDER — LISINOPRIL 5 MG/1
5 TABLET ORAL DAILY
Qty: 90 TABLET | Refills: 3 | OUTPATIENT
Start: 2020-04-03

## 2020-04-07 ENCOUNTER — TELEPHONE (OUTPATIENT)
Dept: FAMILY MEDICINE CLINIC | Facility: CLINIC | Age: 45
End: 2020-04-07

## 2020-04-07 ENCOUNTER — TELEMEDICINE (OUTPATIENT)
Dept: FAMILY MEDICINE CLINIC | Facility: CLINIC | Age: 45
End: 2020-04-07

## 2020-04-07 DIAGNOSIS — T30.0 BURN: Primary | ICD-10-CM

## 2020-04-07 PROCEDURE — 99213 OFFICE O/P EST LOW 20 MIN: CPT | Performed by: NURSE PRACTITIONER

## 2020-04-07 RX ORDER — AMOXICILLIN AND CLAVULANATE POTASSIUM 875; 125 MG/1; MG/1
1 TABLET, FILM COATED ORAL 2 TIMES DAILY
Qty: 20 TABLET | Refills: 0 | Status: SHIPPED | OUTPATIENT
Start: 2020-04-07 | End: 2020-07-15

## 2020-04-07 NOTE — TELEPHONE ENCOUNTER
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

## 2020-04-07 NOTE — PROGRESS NOTES
Subjective   Thomas Mireles is a 44 y.o. male.     Burn   The incident occurred 3 to 5 days ago. The burns occurred in the kitchen (at work.  Didnt report burn as he felt is was going to be ok). The burns occurred while cooking. The burns were a result of contact with a hot surface. Burn location: left hand middle finger. The pain is at a severity of 4/10. The pain is mild. He has tried soaking the burn, salve, running the burn under water and acetaminophen for the symptoms. The treatment provided mild (over cristy past 24 hours increased redness and swelling) relief.       The following portions of the patient's history were reviewed and updated as appropriate: allergies, current medications, past family history, past medical history, past social history, past surgical history and problem list.    Review of Systems   Constitutional: Negative.  Negative for chills and fever.   Skin: Positive for wound.   All other systems reviewed and are negative.      Objective   Physical Exam   Skin:   Left hand middle digit viewed on video.  Mid joint to hand proximal with exudative yellow with surrounding erythema and redness extending into his hand         Assessment/Plan   Thomas was seen today for burn.    Diagnoses and all orders for this visit:    Burn  -     silver sulfadiazine (Silvadene) 1 % cream; Apply  topically to the appropriate area as directed 2 (Two) Times a Day.  -     amoxicillin-clavulanate (Augmentin) 875-125 MG per tablet; Take 1 tablet by mouth 2 (Two) Times a Day.     Skin care advised with protective covering  To monitor for further concerns of infection fever, chills increased redness or pain

## 2020-07-15 ENCOUNTER — RESULTS ENCOUNTER (OUTPATIENT)
Dept: FAMILY MEDICINE CLINIC | Facility: CLINIC | Age: 45
End: 2020-07-15

## 2020-07-15 ENCOUNTER — TELEPHONE (OUTPATIENT)
Dept: FAMILY MEDICINE CLINIC | Facility: CLINIC | Age: 45
End: 2020-07-15

## 2020-07-15 ENCOUNTER — OFFICE VISIT (OUTPATIENT)
Dept: FAMILY MEDICINE CLINIC | Facility: CLINIC | Age: 45
End: 2020-07-15

## 2020-07-15 VITALS
BODY MASS INDEX: 36.76 KG/M2 | HEART RATE: 86 BPM | WEIGHT: 256.8 LBS | SYSTOLIC BLOOD PRESSURE: 140 MMHG | HEIGHT: 70 IN | TEMPERATURE: 98 F | DIASTOLIC BLOOD PRESSURE: 90 MMHG | OXYGEN SATURATION: 100 %

## 2020-07-15 DIAGNOSIS — Z79.4 TYPE 2 DIABETES MELLITUS WITH HYPERGLYCEMIA, WITH LONG-TERM CURRENT USE OF INSULIN (HCC): ICD-10-CM

## 2020-07-15 DIAGNOSIS — E11.65 TYPE 2 DIABETES MELLITUS WITH HYPERGLYCEMIA, WITH LONG-TERM CURRENT USE OF INSULIN (HCC): Primary | ICD-10-CM

## 2020-07-15 DIAGNOSIS — I10 ESSENTIAL HYPERTENSION: ICD-10-CM

## 2020-07-15 DIAGNOSIS — F31.81 BIPOLAR II DISORDER (HCC): ICD-10-CM

## 2020-07-15 DIAGNOSIS — E11.65 TYPE 2 DIABETES MELLITUS WITH HYPERGLYCEMIA, WITH LONG-TERM CURRENT USE OF INSULIN (HCC): ICD-10-CM

## 2020-07-15 DIAGNOSIS — Z79.4 TYPE 2 DIABETES MELLITUS WITH HYPERGLYCEMIA, WITH LONG-TERM CURRENT USE OF INSULIN (HCC): Primary | ICD-10-CM

## 2020-07-15 DIAGNOSIS — M25.50 ARTHRALGIA, UNSPECIFIED JOINT: ICD-10-CM

## 2020-07-15 DIAGNOSIS — R51.9 CHRONIC DAILY HEADACHE: ICD-10-CM

## 2020-07-15 PROCEDURE — 99214 OFFICE O/P EST MOD 30 MIN: CPT | Performed by: NURSE PRACTITIONER

## 2020-07-15 RX ORDER — TOPIRAMATE 100 MG/1
100 TABLET, FILM COATED ORAL 2 TIMES DAILY
Qty: 180 TABLET | Refills: 3 | Status: SHIPPED | OUTPATIENT
Start: 2020-07-15 | End: 2021-04-13

## 2020-07-15 RX ORDER — CYCLOBENZAPRINE HCL 5 MG
5 TABLET ORAL NIGHTLY PRN
Qty: 90 TABLET | Refills: 3 | Status: SHIPPED | OUTPATIENT
Start: 2020-07-15 | End: 2021-03-01 | Stop reason: SDUPTHER

## 2020-07-15 RX ORDER — CHLORAL HYDRATE 500 MG
1000 CAPSULE ORAL 2 TIMES DAILY WITH MEALS
Qty: 60 CAPSULE | Refills: 0 | Status: SHIPPED | OUTPATIENT
Start: 2020-07-15 | End: 2021-06-09 | Stop reason: SDUPTHER

## 2020-07-15 RX ORDER — NAPROXEN 500 MG/1
500 TABLET ORAL 2 TIMES DAILY WITH MEALS
Qty: 180 TABLET | Refills: 3 | Status: SHIPPED | OUTPATIENT
Start: 2020-07-15 | End: 2021-03-01 | Stop reason: SDUPTHER

## 2020-07-15 NOTE — PROGRESS NOTES
Subjective   Thomas Mireles is a 44 y.o. male.   Chief Compliant: The patient presents with Hip Pain (right)          Diabetes   He presents for his follow-up diabetic visit. He has type 2 diabetes mellitus. His disease course has been stable. Hypoglycemia symptoms include headaches (chronic ). Pertinent negatives for hypoglycemia include no confusion, dizziness, hunger, mood changes, nervousness/anxiousness, pallor, seizures, sleepiness, speech difficulty, sweats or tremors. There are no diabetic associated symptoms. Pertinent negatives for diabetes include no blurred vision, no chest pain, no fatigue, no foot paresthesias, no foot ulcerations, no polydipsia, no polyphagia, no polyuria, no visual change, no weakness and no weight loss. There are no hypoglycemic complications. Symptoms are stable. There are no diabetic complications. Risk factors for coronary artery disease include dyslipidemia, family history, obesity, male sex, hypertension, sedentary lifestyle and stress. Current diabetic treatment includes insulin injections and oral agent (dual therapy). His weight is stable. He is following a generally unhealthy (Trying to do better with diet) diet. He never participates in exercise. Home blood sugar record trend: not monitoring routinely. An ACE inhibitor/angiotensin II receptor blocker is not being taken. He does not see a podiatrist.Eye exam is not current.   Hypertension   This is a chronic problem. The current episode started more than 1 year ago. The problem has been waxing and waning (  ) since onset. The problem is controlled. Associated symptoms include headaches (chronic ) and neck pain (chronic). Pertinent negatives include no anxiety, blurred vision, chest pain, malaise/fatigue, orthopnea, palpitations, peripheral edema, PND, shortness of breath or sweats. Risk factors for coronary artery disease include family history, obesity and smoking/tobacco exposure. Current antihypertension treatment  includes ACE inhibitors. The current treatment provides moderate improvement. Compliance problems include exercise, psychosocial issues, medication side effects, medication cost and diet.    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  Since now has a daily headache can pinpoint the direct point of pain daily.  Slight improvement with restart of meds ) problem. The current episode started more than 1 year ago. The problem occurs daily. The problem has been unchanged. The pain is located in the parietal region. The pain does not radiate. The pain quality is similar to prior headaches. The quality of the pain is described as aching, dull and throbbing. The pain is at a severity of 4/10. The pain is moderate. Associated symptoms include neck pain (chronic) and scalp tenderness. Pertinent negatives include no abnormal behavior, back pain, blurred vision, dizziness, drainage, ear pain, eye redness, eye watering, facial sweating, hearing loss, loss of balance, muscle aches, numbness, phonophobia, photophobia, seizures, sinus pressure, tingling, tinnitus, visual change, weakness or weight loss. Associated symptoms comments: Pain is described as sharp quick buzz or sting in the back of his head.  Patient can pinpoint the direct area.  Pain is aggravating every aspect of his life and further worsening his anxiety and depression   . Nothing aggravates the symptoms. He has tried acetaminophen, Excedrin and NSAIDs (Seen chiropractor and neurology with multiple medication trials no changes) for the symptoms. The treatment provided mild relief. His past medical history is significant for hypertension.   Depression   Visit Type: follow-up (Long history of Bipolar on and off medications.  Seen multiple therapist in the past and never really continued any regimen.  Feels he is stable with no changes)  Patient presents with the  following symptoms: dizziness, excessive worry and muscle tension.  Patient is not experiencing: confusion, decreased concentration, depressed mood, malaise, nervousness/anxiety, palpitations, shortness of breath, suicidal ideas, suicidal planning, thoughts of death and weight loss.  Frequency of symptoms: most days   Severity: interfering with daily activities   Sleep quality: fair  Nighttime awakenings: occasional  Compliance with medications:  0-25%  Side effects:  Headaches and weight gain  Hip Pain    The incident occurred more than 1 week ago. There was no injury mechanism. The pain is present in the right hip and left hip. The quality of the pain is described as aching, burning, shooting and stabbing. The pain is at a severity of 5/10. The pain is moderate. The pain has been fluctuating since onset. Pertinent negatives include no inability to bear weight, loss of motion, loss of sensation, muscle weakness, numbness or tingling. He reports no foreign bodies present. The symptoms are aggravated by movement, palpation and weight bearing. He has tried NSAIDs and rest for the symptoms. The treatment provided mild relief.      The following portions of the patient's history were reviewed and updated as appropriate: allergies, current medications, past family history, past medical history, past social history, past surgical history and problem list.      Review of Systems   Constitutional: Negative.  Negative for activity change, fatigue, malaise/fatigue and weight loss.   HENT: Negative for ear pain, hearing loss, sinus pressure and tinnitus.    Eyes: Negative for blurred vision, photophobia and redness.   Respiratory: Negative.  Negative for shortness of breath.    Cardiovascular: Negative.  Negative for chest pain, palpitations, orthopnea and PND.   Endocrine: Negative for polydipsia, polyphagia and polyuria.   Genitourinary: Negative.    Musculoskeletal: Positive for arthralgias, myalgias and neck pain  "(chronic). Negative for back pain.   Skin: Negative for pallor.   Neurological: Positive for headaches (chronic ). Negative for dizziness, tingling, tremors, seizures, syncope, facial asymmetry, speech difficulty, weakness, light-headedness, numbness and loss of balance.   Psychiatric/Behavioral: Negative for agitation, behavioral problems, confusion, decreased concentration, self-injury, sleep disturbance and suicidal ideas. The patient is not nervous/anxious.    All other systems reviewed and are negative.      Procedures    Vitals: Blood pressure 140/90, pulse 86, temperature 98 °F (36.7 °C), temperature source Tympanic, height 177.8 cm (70\"), weight 116 kg (256 lb 12.8 oz), SpO2 100 %.     Allergies:   Allergies   Allergen Reactions   • Statins Other (See Comments)     All over pain,           Objective   Physical Exam   Constitutional: He is oriented to person, place, and time. He appears well-developed and well-nourished. No distress.   HENT:   Head: Normocephalic and atraumatic.   Right Ear: Hearing, tympanic membrane, external ear and ear canal normal.   Left Ear: Hearing, tympanic membrane, external ear and ear canal normal.   Nose: Nose normal. No mucosal edema or rhinorrhea. Right sinus exhibits no maxillary sinus tenderness and no frontal sinus tenderness. Left sinus exhibits no maxillary sinus tenderness and no frontal sinus tenderness.   Mouth/Throat: Uvula is midline, oropharynx is clear and moist and mucous membranes are normal. No oropharyngeal exudate. Tonsils are 0 on the right. Tonsils are 0 on the left. No tonsillar exudate.   Scalp tenderness with light palpitations     Eyes: Pupils are equal, round, and reactive to light. Conjunctivae are normal. Right eye exhibits no discharge. Left eye exhibits no discharge.   Neck: Neck supple. No thyromegaly present.   Cardiovascular: Normal rate, regular rhythm and normal heart sounds.   No murmur heard.  Pulmonary/Chest: Effort normal and breath sounds " normal.   Musculoskeletal:        Right hip: Normal.        Left hip: Normal.        Right knee: Normal.        Cervical back: Normal.   Lymphadenopathy:     He has no cervical adenopathy.   Neurological: He is alert and oriented to person, place, and time. He has normal reflexes. He displays normal reflexes. No cranial nerve deficit or sensory deficit. He exhibits normal muscle tone. Coordination normal.   Skin: Skin is warm and dry. No rash noted. He is not diaphoretic. No erythema.          Psychiatric: He has a normal mood and affect. His behavior is normal. Judgment and thought content normal.   Nursing note and vitals reviewed.      Assessment/Plan   Discussed with patient impression and plan, patient verbalizes understanding.        During this visit the following were done:  Labs Reviewed []    Labs Ordered [x]    Radiology Reports Reviewed []    Radiology Ordered []    PCP Records Reviewed []    Referring Provider Records Reviewed []    ER Records Reviewed []    Hospital Records Reviewed []    History Obtained From Family []    Radiology Images Reviewed []    Other Reviewed []    Records Requested []           Thomas was seen today for hip pain.    Diagnoses and all orders for this visit:    Type 2 diabetes mellitus with hyperglycemia, with long-term current use of insulin (CMS/[facility])  -     Omega-3 Fatty Acids (FISH OIL) 1000 MG capsule capsule; Take 1 capsule by mouth 2 (Two) Times a Day With Meals.  -     CBC Auto Differential; Future  -     Comprehensive Metabolic Panel; Future  -     Hemoglobin A1c; Future  -     Lipid Panel; Future  -     Magnesium; Future  -     TSH; Future  -     Vitamin B12; Future  -     Vitamin D 25 Hydroxy; Future    Essential hypertension  -     metoprolol tartrate (LOPRESSOR) 25 MG tablet; Take 1 tablet by mouth Every 12 (Twelve) Hours.  -     Comprehensive Metabolic Panel; Future  -     Lipid Panel; Future  -     Magnesium; Future  -     TSH; Future    Arthralgia, unspecified  joint  -     naproxen (Naprosyn) 500 MG tablet; Take 1 tablet by mouth 2 (Two) Times a Day With Meals.  -     cyclobenzaprine (FLEXERIL) 5 MG tablet; Take 1 tablet by mouth At Night As Needed for Muscle Spasms.  -     CBC Auto Differential; Future  -     Comprehensive Metabolic Panel; Future  -     Uric Acid; Future  -     Rheumatoid Factor; Future  -     JOHNNY by IFA, Reflex 9-biomarkers profile; Future    Chronic daily headache  -     topiramate (TOPAMAX) 100 MG tablet; Take 1 tablet by mouth 2 (Two) Times a Day.  -     naproxen (Naprosyn) 500 MG tablet; Take 1 tablet by mouth 2 (Two) Times a Day With Meals.  -     cyclobenzaprine (FLEXERIL) 5 MG tablet; Take 1 tablet by mouth At Night As Needed for Muscle Spasms.    Bipolar II disorder (CMS/HCC)  Continue with current meds    Other orders  -     Discontinue: Dulaglutide (Trulicity) 0.75 MG/0.5ML solution pen-injector; Inject 1.2 mg under the skin into the appropriate area as directed Daily.            Patient is a non smoker    Patient's Body mass index is 36.85 kg/m². BMI is above normal parameters. Recommendations include: exercise counseling and nutrition counseling.

## 2020-07-15 NOTE — TELEPHONE ENCOUNTER
Pharmacy called states patient has been on trulicity 0.75mg weekly and it was sent in for 1.2mg daily.They need a new script sent in

## 2020-07-15 NOTE — TELEPHONE ENCOUNTER
Yes I sent it all wrong I wrote 1.2   I sent a corrected 1.5 and increase in his dose to be administered weekly

## 2020-10-11 DIAGNOSIS — I10 ESSENTIAL HYPERTENSION: ICD-10-CM

## 2020-12-23 RX ORDER — DULAGLUTIDE 1.5 MG/.5ML
INJECTION, SOLUTION SUBCUTANEOUS
Qty: 2 ML | Refills: 1 | Status: SHIPPED | OUTPATIENT
Start: 2020-12-23 | End: 2021-01-06 | Stop reason: SDUPTHER

## 2021-01-06 ENCOUNTER — OFFICE VISIT (OUTPATIENT)
Dept: FAMILY MEDICINE CLINIC | Facility: CLINIC | Age: 46
End: 2021-01-06

## 2021-01-06 VITALS
WEIGHT: 265.8 LBS | OXYGEN SATURATION: 99 % | TEMPERATURE: 97.3 F | HEART RATE: 105 BPM | HEIGHT: 70 IN | BODY MASS INDEX: 38.05 KG/M2 | SYSTOLIC BLOOD PRESSURE: 120 MMHG | DIASTOLIC BLOOD PRESSURE: 88 MMHG

## 2021-01-06 DIAGNOSIS — Z79.4 TYPE 2 DIABETES MELLITUS WITH HYPERGLYCEMIA, WITH LONG-TERM CURRENT USE OF INSULIN (HCC): ICD-10-CM

## 2021-01-06 DIAGNOSIS — E11.65 TYPE 2 DIABETES MELLITUS WITH HYPERGLYCEMIA, WITH LONG-TERM CURRENT USE OF INSULIN (HCC): ICD-10-CM

## 2021-01-06 DIAGNOSIS — I10 ESSENTIAL HYPERTENSION: ICD-10-CM

## 2021-01-06 DIAGNOSIS — M54.41 RIGHT-SIDED LOW BACK PAIN WITH RIGHT-SIDED SCIATICA, UNSPECIFIED CHRONICITY: Primary | ICD-10-CM

## 2021-01-06 PROCEDURE — 99214 OFFICE O/P EST MOD 30 MIN: CPT | Performed by: NURSE PRACTITIONER

## 2021-01-06 RX ORDER — DULAGLUTIDE 1.5 MG/.5ML
1.5 INJECTION, SOLUTION SUBCUTANEOUS WEEKLY
Qty: 3 ML | Refills: 1 | Status: SHIPPED | OUTPATIENT
Start: 2021-01-06 | End: 2021-03-01 | Stop reason: SDUPTHER

## 2021-01-06 NOTE — PATIENT INSTRUCTIONS
Low Back Sprain or Strain Rehab  Ask your health care provider which exercises are safe for you. Do exercises exactly as told by your health care provider and adjust them as directed. It is normal to feel mild stretching, pulling, tightness, or discomfort as you do these exercises. Stop right away if you feel sudden pain or your pain gets worse. Do not begin these exercises until told by your health care provider.  Stretching and range-of-motion exercises  These exercises warm up your muscles and joints and improve the movement and flexibility of your back. These exercises also help to relieve pain, numbness, and tingling.  Lumbar rotation    1. Lie on your back on a firm surface and bend your knees.  2. Straighten your arms out to your sides so each arm forms a 90-degree angle (right angle) with a side of your body.  3. Slowly move (rotate) both of your knees to one side of your body until you feel a stretch in your lower back (lumbar). Try not to let your shoulders lift off the floor.  4. Hold this position for __________ seconds.  5. Tense your abdominal muscles and slowly move your knees back to the starting position.  6. Repeat this exercise on the other side of your body.  Repeat __________ times. Complete this exercise __________ times a day.  Single knee to chest    1. Lie on your back on a firm surface with both legs straight.  2. Bend one of your knees. Use your hands to move your knee up toward your chest until you feel a gentle stretch in your lower back and buttock.  ? Hold your leg in this position by holding on to the front of your knee.  ? Keep your other leg as straight as possible.  3. Hold this position for __________ seconds.  4. Slowly return to the starting position.  5. Repeat with your other leg.  Repeat __________ times. Complete this exercise __________ times a day.  Prone extension on elbows    1. Lie on your abdomen on a firm surface (prone position).  2. Prop yourself up on your  elbows.  3. Use your arms to help lift your chest up until you feel a gentle stretch in your abdomen and your lower back.  ? This will place some of your body weight on your elbows. If this is uncomfortable, try stacking pillows under your chest.  ? Your hips should stay down, against the surface that you are lying on. Keep your hip and back muscles relaxed.  4. Hold this position for __________ seconds.  5. Slowly relax your upper body and return to the starting position.  Repeat __________ times. Complete this exercise __________ times a day.  Strengthening exercises  These exercises build strength and endurance in your back. Endurance is the ability to use your muscles for a long time, even after they get tired.  Pelvic tilt  This exercise strengthens the muscles that lie deep in the abdomen.  1. Lie on your back on a firm surface. Bend your knees and keep your feet flat on the floor.  2. Tense your abdominal muscles. Tip your pelvis up toward the ceiling and flatten your lower back into the floor.  ? To help with this exercise, you may place a small towel under your lower back and try to push your back into the towel.  3. Hold this position for __________ seconds.  4. Let your muscles relax completely before you repeat this exercise.  Repeat __________ times. Complete this exercise __________ times a day.  Alternating arm and leg raises    1. Get on your hands and knees on a firm surface. If you are on a hard floor, you may want to use padding, such as an exercise mat, to cushion your knees.  2. Line up your arms and legs. Your hands should be directly below your shoulders, and your knees should be directly below your hips.  3. Lift your left leg behind you. At the same time, raise your right arm and straighten it in front of you.  ? Do not lift your leg higher than your hip.  ? Do not lift your arm higher than your shoulder.  ? Keep your abdominal and back muscles tight.  ? Keep your hips facing the  ground.  ? Do not arch your back.  ? Keep your balance carefully, and do not hold your breath.  4. Hold this position for __________ seconds.  5. Slowly return to the starting position.  6. Repeat with your right leg and your left arm.  Repeat __________ times. Complete this exercise __________ times a day.  Abdominal set with straight leg raise    1. Lie on your back on a firm surface.  2. Bend one of your knees and keep your other leg straight.  3. Tense your abdominal muscles and lift your straight leg up, 4-6 inches (10-15 cm) off the ground.  4. Keep your abdominal muscles tight and hold this position for __________ seconds.  ? Do not hold your breath.  ? Do not arch your back. Keep it flat against the ground.  5. Keep your abdominal muscles tense as you slowly lower your leg back to the starting position.  6. Repeat with your other leg.  Repeat __________ times. Complete this exercise __________ times a day.  Single leg lower with bent knees  1. Lie on your back on a firm surface.  2. Tense your abdominal muscles and lift your feet off the floor, one foot at a time, so your knees and hips are bent in 90-degree angles (right angles).  ? Your knees should be over your hips and your lower legs should be parallel to the floor.  3. Keeping your abdominal muscles tense and your knee bent, slowly lower one of your legs so your toe touches the ground.  4. Lift your leg back up to return to the starting position.  ? Do not hold your breath.  ? Do not let your back arch. Keep your back flat against the ground.  5. Repeat with your other leg.  Repeat __________ times. Complete this exercise __________ times a day.  Posture and body mechanics  Good posture and healthy body mechanics can help to relieve stress in your body's tissues and joints. Body mechanics refers to the movements and positions of your body while you do your daily activities. Posture is part of body mechanics. Good posture means:  · Your spine is in its  natural S-curve position (neutral).  · Your shoulders are pulled back slightly.  · Your head is not tipped forward.  Follow these guidelines to improve your posture and body mechanics in your everyday activities.  Standing    · When standing, keep your spine neutral and your feet about hip width apart. Keep a slight bend in your knees. Your ears, shoulders, and hips should line up.  · When you do a task in which you  one place for a long time, place one foot up on a stable object that is 2-4 inches (5-10 cm) high, such as a footstool. This helps keep your spine neutral.  Sitting    · When sitting, keep your spine neutral and keep your feet flat on the floor. Use a footrest, if necessary, and keep your thighs parallel to the floor. Avoid rounding your shoulders, and avoid tilting your head forward.  · When working at a desk or a computer, keep your desk at a height where your hands are slightly lower than your elbows. Slide your chair under your desk so you are close enough to maintain good posture.  · When working at a computer, place your monitor at a height where you are looking straight ahead and you do not have to tilt your head forward or downward to look at the screen.  Resting  · When lying down and resting, avoid positions that are most painful for you.  · If you have pain with activities such as sitting, bending, stooping, or squatting, lie in a position in which your body does not bend very much. For example, avoid curling up on your side with your arms and knees near your chest (fetal position).  · If you have pain with activities such as standing for a long time or reaching with your arms, lie with your spine in a neutral position and bend your knees slightly. Try the following positions:  ? Lying on your side with a pillow between your knees.  ? Lying on your back with a pillow under your knees.  Lifting    · When lifting objects, keep your feet at least shoulder width apart and tighten your  abdominal muscles.  · Bend your knees and hips and keep your spine neutral. It is important to lift using the strength of your legs, not your back. Do not lock your knees straight out.  · Always ask for help to lift heavy or awkward objects.  This information is not intended to replace advice given to you by your health care provider. Make sure you discuss any questions you have with your health care provider.  Document Revised: 04/10/2020 Document Reviewed: 01/09/2020  Elsevier Patient Education © 2020 [employer]

## 2021-01-11 NOTE — PROGRESS NOTES
Subjective   Thomas Mireles is a 45 y.o. male.   Chief Compliant: The patient presents with Back Pain          Hip Pain   Incident onset: Approx 6 months or greater of lower back pain with Right hip and thigh pain.  There was no injury mechanism. The pain is present in the right hip (right lower back ). The quality of the pain is described as aching and burning. The pain is at a severity of 5/10. The pain is moderate. The pain has been fluctuating since onset. Pertinent negatives include no inability to bear weight, loss of motion, loss of sensation, muscle weakness, numbness or tingling. He reports no foreign bodies present. The symptoms are aggravated by movement, palpation and weight bearing. He has tried NSAIDs and rest (Prior to COVID he was able to get a massage and this would help for sometime) for the symptoms. The treatment provided mild relief.   Diabetes  He presents for his follow-up diabetic visit. He has type 2 diabetes mellitus. His disease course has been stable. Pertinent negatives for hypoglycemia include no hunger, mood changes, pallor, sleepiness, speech difficulty, sweats or tremors. There are no diabetic associated symptoms. Pertinent negatives for diabetes include no fatigue, no foot paresthesias, no foot ulcerations, no polydipsia, no polyphagia and no polyuria. There are no hypoglycemic complications. Symptoms are stable. There are no diabetic complications. Risk factors for coronary artery disease include dyslipidemia, family history, obesity, male sex, hypertension, sedentary lifestyle and stress. Current diabetic treatment includes insulin injections and oral agent (dual therapy). His weight is stable. He is following a generally unhealthy (Trying to do better with diet) diet. He never participates in exercise. Home blood sugar record trend: not monitoring routinely. An ACE inhibitor/angiotensin II receptor blocker is not being taken. He does not see a podiatrist.Eye exam is not current.  "  Hypertension  This is a chronic problem. The current episode started more than 1 year ago. The problem has been waxing and waning (  ) since onset. The problem is controlled. Pertinent negatives include no anxiety, malaise/fatigue, orthopnea, peripheral edema, PND or sweats. Risk factors for coronary artery disease include family history, obesity and smoking/tobacco exposure. Current antihypertension treatment includes ACE inhibitors. The current treatment provides moderate improvement. Compliance problems include exercise, psychosocial issues, medication side effects, medication cost and diet.       The following portions of the patient's history were reviewed and updated as appropriate: allergies, current medications, past family history, past medical history, past social history, past surgical history and problem list.      Review of Systems   Constitutional: Negative.  Negative for activity change, fatigue and malaise/fatigue.   Respiratory: Negative.    Cardiovascular: Negative.  Negative for orthopnea and PND.   Endocrine: Negative for polydipsia, polyphagia and polyuria.   Genitourinary: Negative.    Musculoskeletal: Positive for arthralgias and myalgias.   Skin: Negative for pallor.   Neurological: Negative for tingling, tremors, syncope, facial asymmetry, speech difficulty, light-headedness and numbness.   Psychiatric/Behavioral: Negative for agitation, behavioral problems, self-injury and sleep disturbance.   All other systems reviewed and are negative.      Procedures    Vitals: Blood pressure 120/88, pulse 105, temperature 97.3 °F (36.3 °C), temperature source Temporal, height 177.8 cm (70\"), weight 121 kg (265 lb 12.8 oz), SpO2 99 %.     Allergies:   Allergies   Allergen Reactions   • Statins Other (See Comments)     All over pain,           Objective   Physical Exam   Constitutional: He is oriented to person, place, and time. He appears well-developed. No distress.   HENT:   Head: Normocephalic and " atraumatic.   Right Ear: Hearing, tympanic membrane, external ear and ear canal normal.   Left Ear: Hearing, tympanic membrane, external ear and ear canal normal.   Nose: Nose normal. No mucosal edema or rhinorrhea. Right sinus exhibits no maxillary sinus tenderness and no frontal sinus tenderness. Left sinus exhibits no maxillary sinus tenderness and no frontal sinus tenderness.   Mouth/Throat: Uvula is midline. No oropharyngeal exudate. Tonsils are 0 on the right. Tonsils are 0 on the left. No tonsillar exudate.   Eyes: Pupils are equal, round, and reactive to light. Conjunctivae are normal. Right eye exhibits no discharge. Left eye exhibits no discharge.   Neck: Neck supple. No thyromegaly present.   Cardiovascular: Normal rate, regular rhythm and normal heart sounds.   No murmur heard.  Pulmonary/Chest: Effort normal and breath sounds normal.   Musculoskeletal:      Right hip: Normal.      Left hip: Normal.      Right knee: Normal.      Cervical back: Normal.   Lymphadenopathy:     He has no cervical adenopathy.   Neurological: He is alert and oriented to person, place, and time. He has normal reflexes. He displays normal reflexes. No cranial nerve deficit or sensory deficit. He exhibits normal muscle tone. Coordination normal.   Skin: Skin is warm and dry. No rash noted. He is not diaphoretic. No erythema.          Psychiatric: His behavior is normal. Judgment and thought content normal.   Nursing note and vitals reviewed.      Assessment/Plan   Discussed with patient impression and plan, patient verbalizes understanding.        During this visit the following were done:  Labs Reviewed []    Labs Ordered [x]    Radiology Reports Reviewed []    Radiology Ordered []    PCP Records Reviewed []    Referring Provider Records Reviewed []    ER Records Reviewed []    Hospital Records Reviewed []    History Obtained From Family []    Radiology Images Reviewed []    Other Reviewed []    Records Requested []        Diagnoses and all orders for this visit:    1. Right-sided low back pain with right-sided sciatica, unspecified chronicity (Primary)  Patient will consider chiropractic management   And will continue with ICE/heat and rest when he can  Along with NSAIDS    2. Essential hypertension  -     metoprolol tartrate (LOPRESSOR) 25 MG tablet; Take 1 tablet by mouth Every 12 (Twelve) Hours.  Dispense: 180 tablet; Refill: 1  -     Comprehensive Metabolic Panel; Future  -     Lipid Panel; Future  -     Vitamin B12; Future  -     Vitamin D 25 Hydroxy; Future  -     TSH; Future    3. Type 2 diabetes mellitus with hyperglycemia, with long-term current use of insulin (CMS/[facility])  -     Dulaglutide (Trulicity) 1.5 MG/0.5ML solution pen-injector; Inject 1.5 mg under the skin into the appropriate area as directed 1 (One) Time Per Week.  Dispense: 3 mL; Refill: 1  -     Comprehensive Metabolic Panel; Future  -     Lipid Panel; Future  -     Hemoglobin A1c; Future  -     Vitamin B12; Future  -     Vitamin D 25 Hydroxy; Future  -     TSH; Future  -     MicroAlbumin, Urine, Random - Urine, Clean Catch; Future      Body mass index is 38.14 kg/m².  Encouraged weight loss and improved activity when his work schedule allows

## 2021-02-08 ENCOUNTER — OFFICE VISIT (OUTPATIENT)
Dept: FAMILY MEDICINE CLINIC | Facility: CLINIC | Age: 46
End: 2021-02-08

## 2021-02-08 VITALS
WEIGHT: 264 LBS | HEART RATE: 87 BPM | TEMPERATURE: 97.7 F | BODY MASS INDEX: 37.8 KG/M2 | HEIGHT: 70 IN | OXYGEN SATURATION: 99 % | DIASTOLIC BLOOD PRESSURE: 90 MMHG | SYSTOLIC BLOOD PRESSURE: 138 MMHG

## 2021-02-08 DIAGNOSIS — R45.851 DEPRESSION WITH SUICIDAL IDEATION: Primary | ICD-10-CM

## 2021-02-08 DIAGNOSIS — F32.A DEPRESSION WITH SUICIDAL IDEATION: Primary | ICD-10-CM

## 2021-02-08 PROCEDURE — 99214 OFFICE O/P EST MOD 30 MIN: CPT | Performed by: NURSE PRACTITIONER

## 2021-02-08 NOTE — PROGRESS NOTES
"Answers for HPI/ROS submitted by the patient on 2/6/2021   What is the primary reason for your visit?: Other  Please describe your symptoms.: Bipolar causing health to decline.  Have you had these symptoms before?: Yes  How long have you been having these symptoms?: Greater than 2 weeks  Please list any medications you are currently taking for this condition.: Topamax  Please describe any probable cause for these symptoms. : Mental health.  Chief Complaint  Anxiety    Subjective          Thomas Mireles presents to [facility] for   Anxiety  Presents for follow-up (Returns today with worsening depression.  Today he feels he is at his lowest and states that since 2014.  refilling rouitne meds but not taking planning to take all his insulin at once.  Working full time and finding more difficulty to complete his day.) visit. Symptoms include decreased concentration, depressed mood, insomnia, irritability, malaise, muscle tension, nervous/anxious behavior, restlessness and suicidal ideas. Symptoms occur constantly. The severity of symptoms is severe. The quality of sleep is fair. Nighttime awakenings: several.     Compliance with medications: has not taken any psych meds in a very long time.  Hospitalized in the past and did not follow up.  Doenst trust the psych he has seen in the past. Side effects of treatment include headaches.       Objective   Vital Signs:   /90   Pulse 87   Temp 97.7 °F (36.5 °C) (Temporal)   Ht 177.8 cm (70\")   Wt 120 kg (264 lb)   SpO2 99%   BMI 37.88 kg/m²     Physical Exam  Vitals signs and nursing note reviewed.   Constitutional:       General: He is not in acute distress.     Appearance: Normal appearance. He is well-developed. He is obese. He is not ill-appearing.   Cardiovascular:      Rate and Rhythm: Normal rate and regular rhythm.      Heart sounds: Normal heart sounds. No murmur.   Pulmonary:      Effort: Pulmonary effort is normal.      " Breath sounds: Normal breath sounds.   Skin:     General: Skin is warm and dry.   Neurological:      Mental Status: He is alert and oriented to person, place, and time.   Psychiatric:         Attention and Perception: Attention normal.         Mood and Affect: Mood normal.         Speech: Speech normal.         Behavior: Behavior normal.         Thought Content: Thought content includes suicidal ideation. Thought content includes suicidal plan.         Cognition and Memory: Cognition normal.         Judgment: Judgment normal.        Result Review :                 Assessment and Plan    Problem List Items Addressed This Visit     None      Visit Diagnoses     Depression with suicidal ideation    -  Primary  Discussed with patient the need for immediate and ongoing mental health care.  Patient agrees and is willing for evaluation today.  Agreed for in patient treatment.  Declined [facility] admission/evaluation.  Agreed to go to [facility].  Parents returned to the clinic to accompany patient for evaluation            Follow Up   No follow-ups on file.  Patient was given instructions and counseling regarding his condition or for health maintenance advice. Please see specific information pulled into the AVS if appropriate.

## 2021-03-01 ENCOUNTER — OFFICE VISIT (OUTPATIENT)
Dept: FAMILY MEDICINE CLINIC | Facility: CLINIC | Age: 46
End: 2021-03-01

## 2021-03-01 VITALS
HEIGHT: 70 IN | WEIGHT: 267.8 LBS | TEMPERATURE: 97.1 F | OXYGEN SATURATION: 98 % | SYSTOLIC BLOOD PRESSURE: 120 MMHG | HEART RATE: 104 BPM | DIASTOLIC BLOOD PRESSURE: 78 MMHG | BODY MASS INDEX: 38.34 KG/M2

## 2021-03-01 DIAGNOSIS — M25.50 ARTHRALGIA, UNSPECIFIED JOINT: ICD-10-CM

## 2021-03-01 DIAGNOSIS — R51.9 CHRONIC DAILY HEADACHE: ICD-10-CM

## 2021-03-01 DIAGNOSIS — I10 ESSENTIAL HYPERTENSION: ICD-10-CM

## 2021-03-01 DIAGNOSIS — Z79.4 TYPE 2 DIABETES MELLITUS WITH HYPERGLYCEMIA, WITH LONG-TERM CURRENT USE OF INSULIN (HCC): ICD-10-CM

## 2021-03-01 DIAGNOSIS — F31.81 BIPOLAR II DISORDER (HCC): Primary | ICD-10-CM

## 2021-03-01 DIAGNOSIS — E11.65 TYPE 2 DIABETES MELLITUS WITH HYPERGLYCEMIA, WITH LONG-TERM CURRENT USE OF INSULIN (HCC): ICD-10-CM

## 2021-03-01 PROCEDURE — 82607 VITAMIN B-12: CPT | Performed by: NURSE PRACTITIONER

## 2021-03-01 PROCEDURE — 36415 COLL VENOUS BLD VENIPUNCTURE: CPT | Performed by: NURSE PRACTITIONER

## 2021-03-01 PROCEDURE — 99214 OFFICE O/P EST MOD 30 MIN: CPT | Performed by: NURSE PRACTITIONER

## 2021-03-01 PROCEDURE — 80061 LIPID PANEL: CPT | Performed by: NURSE PRACTITIONER

## 2021-03-01 PROCEDURE — 83036 HEMOGLOBIN GLYCOSYLATED A1C: CPT | Performed by: NURSE PRACTITIONER

## 2021-03-01 PROCEDURE — 82043 UR ALBUMIN QUANTITATIVE: CPT | Performed by: NURSE PRACTITIONER

## 2021-03-01 PROCEDURE — 80053 COMPREHEN METABOLIC PANEL: CPT | Performed by: NURSE PRACTITIONER

## 2021-03-01 PROCEDURE — 82306 VITAMIN D 25 HYDROXY: CPT | Performed by: NURSE PRACTITIONER

## 2021-03-01 PROCEDURE — 84443 ASSAY THYROID STIM HORMONE: CPT | Performed by: NURSE PRACTITIONER

## 2021-03-01 RX ORDER — NAPROXEN 500 MG/1
500 TABLET ORAL 2 TIMES DAILY WITH MEALS
Qty: 180 TABLET | Refills: 1 | Status: SHIPPED | OUTPATIENT
Start: 2021-03-01

## 2021-03-01 RX ORDER — ZIPRASIDONE HYDROCHLORIDE 40 MG/1
40 CAPSULE ORAL 2 TIMES DAILY
COMMUNITY
Start: 2021-02-24 | End: 2021-05-12 | Stop reason: ALTCHOICE

## 2021-03-01 RX ORDER — FENOFIBRATE 145 MG/1
145 TABLET, COATED ORAL DAILY
COMMUNITY
Start: 2021-02-14 | End: 2021-03-01 | Stop reason: SDUPTHER

## 2021-03-01 RX ORDER — CYCLOBENZAPRINE HCL 5 MG
5 TABLET ORAL NIGHTLY PRN
Qty: 90 TABLET | Refills: 1 | Status: SHIPPED | OUTPATIENT
Start: 2021-03-01 | End: 2021-06-30 | Stop reason: SDUPTHER

## 2021-03-01 RX ORDER — FENOFIBRATE 145 MG/1
145 TABLET, COATED ORAL DAILY
Qty: 90 TABLET | Refills: 0 | Status: SHIPPED | OUTPATIENT
Start: 2021-03-01 | End: 2021-06-09 | Stop reason: SDUPTHER

## 2021-03-01 RX ORDER — LISINOPRIL 5 MG/1
5 TABLET ORAL DAILY
COMMUNITY
Start: 2021-02-14 | End: 2021-03-01 | Stop reason: SDUPTHER

## 2021-03-01 RX ORDER — PEN NEEDLE, DIABETIC 29 G X1/2"
1 NEEDLE, DISPOSABLE MISCELLANEOUS DAILY
Qty: 100 EACH | Refills: 1 | Status: SHIPPED | OUTPATIENT
Start: 2021-03-01 | End: 2021-04-16

## 2021-03-01 RX ORDER — SITAGLIPTIN 50 MG/1
50 TABLET, FILM COATED ORAL DAILY
COMMUNITY
Start: 2021-02-14 | End: 2021-03-01 | Stop reason: SDUPTHER

## 2021-03-01 RX ORDER — DULAGLUTIDE 1.5 MG/.5ML
1.5 INJECTION, SOLUTION SUBCUTANEOUS WEEKLY
Qty: 3 ML | Refills: 1 | Status: SHIPPED | OUTPATIENT
Start: 2021-03-01 | End: 2021-04-19 | Stop reason: SDUPTHER

## 2021-03-01 RX ORDER — LISINOPRIL 5 MG/1
5 TABLET ORAL DAILY
Qty: 90 TABLET | Refills: 0 | Status: SHIPPED | OUTPATIENT
Start: 2021-03-01 | End: 2021-06-09 | Stop reason: SDUPTHER

## 2021-03-01 RX ORDER — SITAGLIPTIN 50 MG/1
50 TABLET, FILM COATED ORAL DAILY
Qty: 90 TABLET | Refills: 0 | Status: CANCELLED | OUTPATIENT
Start: 2021-03-01

## 2021-03-02 ENCOUNTER — TELEPHONE (OUTPATIENT)
Dept: FAMILY MEDICINE CLINIC | Facility: CLINIC | Age: 46
End: 2021-03-02

## 2021-03-02 LAB
25(OH)D3 SERPL-MCNC: 16 NG/ML (ref 30–100)
ALBUMIN SERPL-MCNC: 4.2 G/DL (ref 3.5–5.2)
ALBUMIN UR-MCNC: <1.2 MG/DL
ALBUMIN/GLOB SERPL: 1.4 G/DL
ALP SERPL-CCNC: 56 U/L (ref 39–117)
ALT SERPL W P-5'-P-CCNC: 27 U/L (ref 1–41)
ANION GAP SERPL CALCULATED.3IONS-SCNC: 10.7 MMOL/L (ref 5–15)
AST SERPL-CCNC: 19 U/L (ref 1–40)
BILIRUB SERPL-MCNC: 0.6 MG/DL (ref 0–1.2)
BUN SERPL-MCNC: 20 MG/DL (ref 6–20)
BUN/CREAT SERPL: 23 (ref 7–25)
CALCIUM SPEC-SCNC: 9.6 MG/DL (ref 8.6–10.5)
CHLORIDE SERPL-SCNC: 101 MMOL/L (ref 98–107)
CHOLEST SERPL-MCNC: 170 MG/DL (ref 0–200)
CO2 SERPL-SCNC: 25.3 MMOL/L (ref 22–29)
CREAT SERPL-MCNC: 0.87 MG/DL (ref 0.76–1.27)
GFR SERPL CREATININE-BSD FRML MDRD: 94 ML/MIN/1.73
GLOBULIN UR ELPH-MCNC: 3.1 GM/DL
GLUCOSE SERPL-MCNC: 115 MG/DL (ref 65–99)
HBA1C MFR BLD: 9.33 % (ref 4.8–5.6)
HDLC SERPL-MCNC: 34 MG/DL (ref 40–60)
LDLC SERPL CALC-MCNC: 109 MG/DL (ref 0–100)
LDLC/HDLC SERPL: 3.11 {RATIO}
POTASSIUM SERPL-SCNC: 4.4 MMOL/L (ref 3.5–5.2)
PROT SERPL-MCNC: 7.3 G/DL (ref 6–8.5)
SODIUM SERPL-SCNC: 137 MMOL/L (ref 136–145)
TRIGL SERPL-MCNC: 152 MG/DL (ref 0–150)
TSH SERPL DL<=0.05 MIU/L-ACNC: 1.19 UIU/ML (ref 0.27–4.2)
VIT B12 BLD-MCNC: 331 PG/ML (ref 211–946)
VLDLC SERPL-MCNC: 27 MG/DL (ref 5–40)

## 2021-03-02 RX ORDER — BLOOD-GLUCOSE METER
1 KIT MISCELLANEOUS AS NEEDED
Qty: 1 EACH | Refills: 0 | Status: SHIPPED | OUTPATIENT
Start: 2021-03-02

## 2021-03-02 RX ORDER — BLOOD-GLUCOSE METER
1 KIT MISCELLANEOUS AS NEEDED
COMMUNITY
End: 2021-03-02 | Stop reason: SDUPTHER

## 2021-03-02 RX ORDER — LANCETS 30 GAUGE
1 EACH MISCELLANEOUS DAILY
COMMUNITY
End: 2021-03-02 | Stop reason: SDUPTHER

## 2021-03-02 RX ORDER — LANCETS 30 GAUGE
1 EACH MISCELLANEOUS DAILY
Qty: 100 EACH | Refills: 1 | Status: SHIPPED | OUTPATIENT
Start: 2021-03-02 | End: 2021-06-09 | Stop reason: SDUPTHER

## 2021-03-02 RX ORDER — ERGOCALCIFEROL 1.25 MG/1
50000 CAPSULE ORAL
Qty: 5 CAPSULE | Refills: 2 | Status: SHIPPED | OUTPATIENT
Start: 2021-03-02 | End: 2021-04-13 | Stop reason: SDUPTHER

## 2021-03-02 NOTE — TELEPHONE ENCOUNTER
PATIENT HAD APPOINTMENT YESTERDAY AND UNSURE ABOUT HIS PRESCRIPTION FOR NEEDLES/PEN NEEDLES.  HE GOT PEN NEEDLES AND DOESN'T THINK HE HAS INJECTION, THINKS HE NEED GLUCOMETER AND LANCETS AND TEST STRIPS.     PLEASE CALL [phone]

## 2021-03-02 NOTE — TELEPHONE ENCOUNTER
PATIENT HAD APPOINTMENT YESTERDAY AND UNSURE ABOUT HIS PRESCRIPTION FOR NEEDLES/PEN NEEDLES.  HE GOT PEN NEEDLES AND DOESN'T THINK HE HAS INJECTION, THINKS HE NEED GLUCOMETER AND LANCETS AND TEST STRIPS.     PLEASE CALL [phone]    Spoke with patient,looks like everything has been sent except his test strips,pended.

## 2021-03-02 NOTE — TELEPHONE ENCOUNTER
----- Message from LAUREN Majano sent at 3/2/2021  9:08 AM EST -----  VIT D is low sent replacement ot be taken once weekly  Labs ok not too bad and will further improve with continued medication use  A1C is 9.8 but his fasting 115 so meds are working    Left a message to return call.      Spoke with patient & he verbalized understanding.

## 2021-03-08 NOTE — PROGRESS NOTES
Subjective   Thomas Mireles is a 45 y.o. male.   Chief Compliant: The patient presents with Depression and Follow-up    Diabetes  He presents for his follow-up diabetic visit. He has type 2 diabetes mellitus. No MedicAlert identification noted. The initial diagnosis of diabetes was made 9 years ago. His disease course has been stable. Hypoglycemia symptoms include headaches (chronic ). Pertinent negatives for hypoglycemia include no confusion, dizziness, hunger, mood changes, nervousness/anxiousness, pallor, seizures, sleepiness, speech difficulty, sweats or tremors. There are no diabetic associated symptoms. Pertinent negatives for diabetes include no blurred vision, no chest pain, no fatigue, no foot paresthesias, no foot ulcerations, no polydipsia, no polyphagia, no polyuria, no visual change, no weakness and no weight loss. There are no hypoglycemic complications. Pertinent negatives for hypoglycemia complications include no blackouts, no hospitalization, no nocturnal hypoglycemia, no required assistance and no required glucagon injection. Symptoms are stable. There are no diabetic complications. Pertinent negatives for diabetic complications include no CVA, heart disease, nephropathy, peripheral neuropathy, PVD or retinopathy. Risk factors for coronary artery disease include dyslipidemia, family history, obesity, male sex, hypertension, sedentary lifestyle and stress. Current diabetic treatment includes insulin injections and oral agent (dual therapy). He is compliant with treatment most of the time. He is currently taking insulin pre-breakfast. Insulin injections are given by patient. Rotation sites for injection include the abdominal wall. His weight is stable. He is following a generally unhealthy (Trying to do better with diet) diet. Meal planning includes none and avoidance of concentrated sweets. He has not had a previous visit with a dietitian. He never participates in exercise. He monitors blood  glucose at home 1-2 x per day. He monitors urine at home <1 x per month. Blood glucose monitoring compliance is adequate. Home blood sugar record trend: not monitoring routinely. His breakfast blood glucose is taken between 6-7 am. His breakfast blood glucose range is generally 140-180 mg/dl. His lunch blood glucose is taken between 12-1 pm. His lunch blood glucose range is generally 140-180 mg/dl. His dinner blood glucose is taken between 6-7 pm. His dinner blood glucose range is generally 140-180 mg/dl. His highest blood glucose is >200 mg/dl. His overall blood glucose range is 140-180 mg/dl. An ACE inhibitor/angiotensin II receptor blocker is not being taken. He does not see a podiatrist.Eye exam is not current.   Hypertension  This is a chronic problem. The current episode started more than 1 year ago. The problem has been waxing and waning since onset. The problem is controlled. Associated symptoms include headaches (chronic ) and neck pain (chronic). Pertinent negatives include no anxiety, blurred vision, chest pain, malaise/fatigue, orthopnea, palpitations, peripheral edema, PND, shortness of breath or sweats. Risk factors for coronary artery disease include family history, obesity and smoking/tobacco exposure. Current antihypertension treatment includes ACE inhibitors. The current treatment provides moderate improvement. Compliance problems include exercise, psychosocial issues, medication side effects, medication cost and diet.  There is no history of CVA, PVD or retinopathy.   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  Since now has a daily headache can pinpoint the direct point of pain daily.  ) problem. The current episode started more than 1 year ago. The problem occurs daily. The problem has been unchanged. The pain is located in the parietal region. The pain does not radiate. The pain quality is  similar to prior headaches. The quality of the pain is described as aching, dull and throbbing. The pain is at a severity of 4/10. The pain is moderate. Associated symptoms include neck pain (chronic) and scalp tenderness. Pertinent negatives include no abnormal behavior, back pain, blurred vision, dizziness, drainage, ear pain, eye redness, eye watering, facial sweating, hearing loss, loss of balance, muscle aches, phonophobia, photophobia, seizures, sinus pressure, tinnitus, visual change, weakness or weight loss. Associated symptoms comments: Pain is described as sharp quick buzz or sting in the back of his head.  Patient can pinpoint the direct area.  Pain is aggravating every aspect of his life and further worsening his anxiety and depression   . Nothing aggravates the symptoms. He has tried acetaminophen, Excedrin and NSAIDs (Seen chiropractor and neurology with multiple medication trials no changes) for the symptoms. The treatment provided mild relief. His past medical history is significant for hypertension.   Depression  Visit Type: follow-up (Recent admission [facility].  New medication starts and states he is feeling some better and will continue with progrma )  Patient presents with the following symptoms: decreased concentration, depressed mood, dizziness, excessive worry and muscle tension.  Patient is not experiencing: confusion, malaise, nervousness/anxiety, palpitations, shortness of breath, suicidal ideas, suicidal planning, thoughts of death and weight loss.  Frequency of symptoms: most days   Severity: interfering with daily activities   Sleep quality: fair  Nighttime awakenings: occasional  Compliance with medications:  0-25%  Side effects:  Headaches and weight gain     The following portions of the patient's history were reviewed and updated as appropriate: allergies, current medications, past family history, past medical history, past social history, past surgical history and  "problem list.      Review of Systems   Constitutional: Negative.  Negative for activity change, fatigue, malaise/fatigue and weight loss.   HENT: Negative for ear pain, hearing loss, sinus pressure and tinnitus.    Eyes: Negative for blurred vision, photophobia and redness.   Respiratory: Negative.  Negative for shortness of breath.    Cardiovascular: Negative.  Negative for chest pain, palpitations, orthopnea and PND.   Endocrine: Negative for polydipsia, polyphagia and polyuria.   Genitourinary: Negative.    Musculoskeletal: Positive for arthralgias, myalgias and neck pain (chronic). Negative for back pain.   Skin: Negative for pallor.   Neurological: Positive for headaches (chronic ). Negative for dizziness, tremors, seizures, syncope, facial asymmetry, speech difficulty, weakness, light-headedness and loss of balance.   Psychiatric/Behavioral: Positive for decreased concentration. Negative for agitation, behavioral problems, confusion, self-injury, sleep disturbance and suicidal ideas. The patient is not nervous/anxious.    All other systems reviewed and are negative.      Procedures    Vitals: Blood pressure 120/78, pulse 104, temperature 97.1 °F (36.2 °C), temperature source Temporal, height 177.8 cm (70\"), weight 121 kg (267 lb 12.8 oz), SpO2 98 %.     Allergies:   Allergies   Allergen Reactions   • Statins Other (See Comments)     All over pain,    • Zofran [Ondansetron] Nausea And Vomiting   • Zoloft [Sertraline Hcl] Nausea And Vomiting          Objective   Physical Exam   Constitutional: He is oriented to person, place, and time. He appears well-developed. No distress.   HENT:   Head: Normocephalic and atraumatic.   Right Ear: Hearing, tympanic membrane, external ear and ear canal normal.   Left Ear: Hearing, tympanic membrane, external ear and ear canal normal.   Nose: Nose normal. No mucosal edema or rhinorrhea. Right sinus exhibits no maxillary sinus tenderness and no frontal sinus tenderness. Left " sinus exhibits no maxillary sinus tenderness and no frontal sinus tenderness.   Mouth/Throat: Uvula is midline. No oropharyngeal exudate. Tonsils are 0 on the right. Tonsils are 0 on the left. No tonsillar exudate.   Eyes: Pupils are equal, round, and reactive to light. Conjunctivae are normal. Right eye exhibits no discharge. Left eye exhibits no discharge.   Neck: No thyromegaly present.   Cardiovascular: Normal rate, regular rhythm and normal heart sounds.   No murmur heard.  Pulmonary/Chest: Effort normal and breath sounds normal.   Musculoskeletal:      Right hip: Normal.      Left hip: Normal.      Right knee: Normal.      Cervical back: Normal.   Lymphadenopathy:     He has no cervical adenopathy.   Neurological: He is alert and oriented to person, place, and time. He has normal reflexes. He displays normal reflexes. No cranial nerve deficit or sensory deficit. He exhibits normal muscle tone. Coordination normal.   Skin: Skin is warm and dry. No rash noted. He is not diaphoretic. No erythema.          Psychiatric: His behavior is normal. Mood, judgment and thought content normal.   Nursing note and vitals reviewed.      Assessment/Plan   Discussed with patient impression and plan, patient verbalizes understanding.        During this visit the following were done:  Labs Reviewed [x]    Labs Ordered [x]    Radiology Reports Reviewed []    Radiology Ordered []    PCP Records Reviewed []    Referring Provider Records Reviewed []    ER Records Reviewed []    Hospital Records Reviewed [x]    History Obtained From Family []    Radiology Images Reviewed []    Other Reviewed [x]    Records Requested []       Diagnoses and all orders for this visit:    1. Bipolar II disorder (CMS/[facility]) (Primary)    2. Arthralgia, unspecified joint  -     cyclobenzaprine (FLEXERIL) 5 MG tablet; Take 1 tablet by mouth At Night As Needed for Muscle Spasms.  Dispense: 90 tablet; Refill: 1  -     naproxen (Naprosyn) 500 MG tablet; Take 1  tablet by mouth 2 (Two) Times a Day With Meals.  Dispense: 180 tablet; Refill: 1    3. Chronic daily headache  -     cyclobenzaprine (FLEXERIL) 5 MG tablet; Take 1 tablet by mouth At Night As Needed for Muscle Spasms.  Dispense: 90 tablet; Refill: 1  -     naproxen (Naprosyn) 500 MG tablet; Take 1 tablet by mouth 2 (Two) Times a Day With Meals.  Dispense: 180 tablet; Refill: 1    4. Type 2 diabetes mellitus with hyperglycemia, with long-term current use of insulin (CMS/[facility])  -     fenofibrate (TRICOR) 145 MG tablet; Take 1 tablet by mouth Daily.  Dispense: 90 tablet; Refill: 0  -     lisinopril (PRINIVIL,ZESTRIL) 5 MG tablet; Take 1 tablet by mouth Daily.  Dispense: 90 tablet; Refill: 0  -     Insulin Pen Needle (Ultra-Thin II Pen Needles) 29G X 12.7MM misc; 1 each Daily.  Dispense: 100 each; Refill: 1  -     Dulaglutide (Trulicity) 1.5 MG/0.5ML solution pen-injector; Inject 1.5 mg under the skin into the appropriate area as directed 1 (One) Time Per Week.  Dispense: 3 mL; Refill: 1  -     glucose blood test strip; Use as instructed to check sugar once daily  Dispense: 100 each; Refill: 12  -     Comprehensive Metabolic Panel  -     Lipid Panel  -     Hemoglobin A1c  -     Vitamin B12  -     Vitamin D 25 Hydroxy  -     TSH  -     MicroAlbumin, Urine, Random - Urine, Clean Catch  -     glucose monitor monitoring kit; 1 each As Needed (use to check sugar once daily).  Dispense: 1 each; Refill: 0  -     Lancets misc; 1 each Daily.  Dispense: 100 each; Refill: 1    5. Essential hypertension  -     Comprehensive Metabolic Panel  -     Lipid Panel  -     Vitamin B12  -     Vitamin D 25 Hydroxy  -     TSH    Other orders  -     Cancel: Januvia 50 MG tablet; Take 1 tablet by mouth Daily.  Dispense: 90 tablet; Refill: 0              Patient is a non smoker    Patient's Body mass index is 38.43 kg/m². BMI is above normal parameters. Recommendations include: exercise counseling and nutrition counseling.

## 2021-03-15 ENCOUNTER — OFFICE VISIT (OUTPATIENT)
Dept: FAMILY MEDICINE CLINIC | Facility: CLINIC | Age: 46
End: 2021-03-15

## 2021-03-15 VITALS
HEIGHT: 70 IN | OXYGEN SATURATION: 99 % | BODY MASS INDEX: 38.6 KG/M2 | WEIGHT: 269.6 LBS | TEMPERATURE: 96.8 F | DIASTOLIC BLOOD PRESSURE: 82 MMHG | HEART RATE: 116 BPM | SYSTOLIC BLOOD PRESSURE: 120 MMHG

## 2021-03-15 DIAGNOSIS — F31.81 BIPOLAR II DISORDER (HCC): Primary | ICD-10-CM

## 2021-03-15 DIAGNOSIS — R14.0 ABDOMINAL BLOATING: ICD-10-CM

## 2021-03-15 PROCEDURE — 99212 OFFICE O/P EST SF 10 MIN: CPT | Performed by: NURSE PRACTITIONER

## 2021-03-15 RX ORDER — SITAGLIPTIN 50 MG/1
TABLET, FILM COATED ORAL
COMMUNITY
Start: 2021-03-13 | End: 2021-03-15 | Stop reason: SDUPTHER

## 2021-03-16 ENCOUNTER — BULK ORDERING (OUTPATIENT)
Dept: CASE MANAGEMENT | Facility: OTHER | Age: 46
End: 2021-03-16

## 2021-03-16 DIAGNOSIS — Z23 IMMUNIZATION DUE: ICD-10-CM

## 2021-03-17 ENCOUNTER — IMMUNIZATION (OUTPATIENT)
Dept: VACCINE CLINIC | Facility: HOSPITAL | Age: 46
End: 2021-03-17

## 2021-03-17 DIAGNOSIS — Z23 IMMUNIZATION DUE: ICD-10-CM

## 2021-03-17 PROCEDURE — 0001A: CPT | Performed by: INTERNAL MEDICINE

## 2021-03-17 PROCEDURE — 91300 HC SARSCOV02 VAC 30MCG/0.3ML IM: CPT | Performed by: INTERNAL MEDICINE

## 2021-03-17 NOTE — PROGRESS NOTES
"Chief Complaint  Gas and paperwork ([employer]; needs reviewed)    Subjective          Thomas Mireles presents to [facility]  Request correction for FMLA as psych start leave at first day follow up post hospital stay.        Gas  This is a new problem. The current episode started in the past 7 days. The problem occurs intermittently. The problem has been waxing and waning. Pertinent negatives include no chest pain, fatigue, nausea, vertigo or vomiting. Exacerbated by: questions new start new psych meds and restart of diabetic med. He has tried nothing for the symptoms.       Objective   Vital Signs:   /82   Pulse 116   Temp 96.8 °F (36 °C) (Temporal)   Ht 177.8 cm (70\")   Wt 122 kg (269 lb 9.6 oz)   SpO2 99%   BMI 38.68 kg/m²     Physical Exam  Vitals and nursing note reviewed.   Constitutional:       Appearance: He is well-developed.   Cardiovascular:      Rate and Rhythm: Normal rate and regular rhythm.      Heart sounds: Normal heart sounds. No murmur heard.     Pulmonary:      Effort: Pulmonary effort is normal.      Breath sounds: Normal breath sounds.   Abdominal:      General: There is no distension.      Palpations: Abdomen is soft.      Tenderness: There is no abdominal tenderness.   Skin:     General: Skin is warm and dry.   Neurological:      Mental Status: He is alert and oriented to person, place, and time.   Psychiatric:         Mood and Affect: Mood normal.         Behavior: Behavior normal.         Thought Content: Thought content normal.         Judgment: Judgment normal.      Comments: Feels he is doing some better        Result Review :                 Assessment and Plan    Diagnoses and all orders for this visit:    1. Bipolar II disorder (CMS/[facility]) (Primary)  COntinue with psych and counseling follow up.  As he is currently tolerating his medications agrees to continue   FMLA corrected with new forms completed    2. Abdominal bloating  Gas X prior to " meals.   RTC of symptoms persist or worsen in any way         Follow Up   No follow-ups on file.  Patient was given instructions and counseling regarding his condition or for health maintenance advice. Please see specific information pulled into the AVS if appropriate.

## 2021-04-06 ENCOUNTER — IMMUNIZATION (OUTPATIENT)
Dept: VACCINE CLINIC | Facility: HOSPITAL | Age: 46
End: 2021-04-06

## 2021-04-06 PROCEDURE — 0002A: CPT | Performed by: INTERNAL MEDICINE

## 2021-04-06 PROCEDURE — 91300 HC SARSCOV02 VAC 30MCG/0.3ML IM: CPT | Performed by: INTERNAL MEDICINE

## 2021-04-13 ENCOUNTER — OFFICE VISIT (OUTPATIENT)
Dept: FAMILY MEDICINE CLINIC | Facility: CLINIC | Age: 46
End: 2021-04-13

## 2021-04-13 VITALS
SYSTOLIC BLOOD PRESSURE: 118 MMHG | HEART RATE: 110 BPM | WEIGHT: 267.2 LBS | TEMPERATURE: 97.1 F | DIASTOLIC BLOOD PRESSURE: 88 MMHG | OXYGEN SATURATION: 100 % | BODY MASS INDEX: 38.25 KG/M2 | HEIGHT: 70 IN

## 2021-04-13 DIAGNOSIS — R14.0 ABDOMINAL BLOATING: ICD-10-CM

## 2021-04-13 DIAGNOSIS — Z79.4 TYPE 2 DIABETES MELLITUS WITH HYPERGLYCEMIA, WITH LONG-TERM CURRENT USE OF INSULIN (HCC): ICD-10-CM

## 2021-04-13 DIAGNOSIS — E66.01 MORBID (SEVERE) OBESITY DUE TO EXCESS CALORIES (HCC): ICD-10-CM

## 2021-04-13 DIAGNOSIS — F31.81 BIPOLAR II DISORDER (HCC): ICD-10-CM

## 2021-04-13 DIAGNOSIS — L71.9 ROSACEA: ICD-10-CM

## 2021-04-13 DIAGNOSIS — E11.65 TYPE 2 DIABETES MELLITUS WITH HYPERGLYCEMIA, WITH LONG-TERM CURRENT USE OF INSULIN (HCC): ICD-10-CM

## 2021-04-13 DIAGNOSIS — R10.13 DYSPEPSIA: Primary | ICD-10-CM

## 2021-04-13 PROCEDURE — 86677 HELICOBACTER PYLORI ANTIBODY: CPT | Performed by: NURSE PRACTITIONER

## 2021-04-13 PROCEDURE — 36415 COLL VENOUS BLD VENIPUNCTURE: CPT | Performed by: NURSE PRACTITIONER

## 2021-04-13 PROCEDURE — 99214 OFFICE O/P EST MOD 30 MIN: CPT | Performed by: NURSE PRACTITIONER

## 2021-04-13 RX ORDER — METRONIDAZOLE 7.5 MG/G
GEL TOPICAL 2 TIMES DAILY
Qty: 45 G | Refills: 5 | Status: SHIPPED | OUTPATIENT
Start: 2021-04-13

## 2021-04-13 RX ORDER — PANTOPRAZOLE SODIUM 40 MG/1
40 TABLET, DELAYED RELEASE ORAL DAILY
Qty: 90 TABLET | Refills: 1 | Status: SHIPPED | OUTPATIENT
Start: 2021-04-13 | End: 2021-06-30 | Stop reason: SDUPTHER

## 2021-04-13 RX ORDER — ERGOCALCIFEROL 1.25 MG/1
50000 CAPSULE ORAL
Qty: 5 CAPSULE | Refills: 2 | Status: SHIPPED | OUTPATIENT
Start: 2021-04-13 | End: 2021-06-30 | Stop reason: SDUPTHER

## 2021-04-15 LAB
H PYLORI IGA SER-ACNC: <9 UNITS (ref 0–8.9)
H PYLORI IGG SER IA-ACNC: 0.13 INDEX VALUE (ref 0–0.79)

## 2021-04-16 ENCOUNTER — TELEPHONE (OUTPATIENT)
Dept: FAMILY MEDICINE CLINIC | Facility: CLINIC | Age: 46
End: 2021-04-16

## 2021-04-16 DIAGNOSIS — R10.13 DYSPEPSIA: Primary | ICD-10-CM

## 2021-04-16 DIAGNOSIS — R14.0 ABDOMINAL BLOATING: ICD-10-CM

## 2021-04-16 NOTE — PROGRESS NOTES
Chief Complaint  Skin Problem, Depression, and GI Problem    Subjective          Thomas Mireles presents to [facility]  Skin Problem  This is a chronic (Rosacea ) problem. The current episode started more than 1 year ago. The problem has been gradually worsening. Associated symptoms include abdominal pain and nausea. Pertinent negatives include no chills. The symptoms are aggravated by stress. Treatments tried: topical metrogel. The treatment provided mild relief.   Depression  Visit Type: follow-up (Known Bipolar with recent psych admisssion.  Feels he is some better but anxious to increase meds.  Scheduled next week with physicatrist.  Continues with therapist as well)  Patient presents with the following symptoms: depressed mood, feelings of worthlessness, irritability and weight gain.  Patient is not experiencing: suicidal ideas, suicidal planning and thoughts of death.  Severity: interfering with daily activities (Has been unable to return to work  )   Sleep quality: fair  Nighttime awakenings: occasional  Side effects:  Weight gain  GI Problem  The primary symptoms include abdominal pain, nausea and diarrhea. The illness began more than 7 days ago. The problem has not changed since onset.  The illness is also significant for bloating. The illness does not include chills or dysphagia.   Diabetes  He presents for his follow-up (improvement since oback on medications consitently) diabetic visit. He has type 2 diabetes mellitus. His disease course has been improving. Symptoms are improving. Risk factors for coronary artery disease include hypertension, male sex and family history. He is compliant with treatment some of the time. He has not had a previous visit with a dietitian. He never participates in exercise. His home blood glucose trend is decreasing steadily. His breakfast blood glucose range is generally 140-180 mg/dl.       Objective   Vital Signs:   /88   Pulse 110  "  Temp 97.1 °F (36.2 °C) (Temporal)   Ht 177.8 cm (70\")   Wt 121 kg (267 lb 3.2 oz)   SpO2 100%   BMI 38.34 kg/m²     Physical Exam  Vitals and nursing note reviewed.   Constitutional:       General: He is not in acute distress.     Appearance: He is well-developed. He is obese. He is not ill-appearing.   Cardiovascular:      Rate and Rhythm: Normal rate and regular rhythm.      Heart sounds: Normal heart sounds. No murmur heard.     Pulmonary:      Effort: Pulmonary effort is normal.      Breath sounds: Normal breath sounds.   Abdominal:      General: Bowel sounds are normal. There is no distension.      Palpations: Abdomen is soft.      Tenderness: There is abdominal tenderness.   Skin:     General: Skin is warm and dry.      Findings: Rash present.   Neurological:      Mental Status: He is alert and oriented to person, place, and time.   Psychiatric:         Mood and Affect: Mood normal.         Behavior: Behavior normal.         Thought Content: Thought content normal.         Judgment: Judgment normal.      Comments: Feels he is doing some better        Result Review :                 Assessment and Plan    Diagnoses and all orders for this visit:    Diagnoses and all orders for this visit:    1. Dyspepsia (Primary)  -     H.pylori,IgG / IgA Antibodies; Future  -     pantoprazole (PROTONIX) 40 MG EC tablet; Take 1 tablet by mouth Daily.  Dispense: 90 tablet; Refill: 1  -     H.pylori,IgG / IgA Antibodies    2. Rosacea  -     metroNIDAZOLE (METROGEL) 0.75 % gel; Apply  topically to the appropriate area as directed 2 (Two) Times a Day.  Dispense: 45 g; Refill: 5    3. Bipolar II disorder (CMS/[facility])  Continue with psychiatry    4. Type 2 diabetes mellitus with hyperglycemia, with long-term current use of insulin (CMS/[facility])  Continue with current medications    5. Morbid (severe) obesity due to excess calories (CMS/[facility])  Diet consistencies encouraged    6. Abdominal bloating  Will likely refer to GI  Other " orders  -     vitamin D (ERGOCALCIFEROL) 1.25 MG (13996 UT) capsule capsule; Take 1 capsule by mouth Every 7 (Seven) Days.  Dispense: 5 capsule; Refill: 2        Follow Up   Return in about 4 weeks (around 5/11/2021), or if symptoms worsen or fail to improve, for Recheck labs today.  Patient was given instructions and counseling regarding his condition or for health maintenance advice. Please see specific information pulled into the AVS if appropriate.

## 2021-04-16 NOTE — TELEPHONE ENCOUNTER
Mailed letter.     ----- Message from LAUREN Majano sent at 4/16/2021  9:25 AM EDT -----  Let patient know that Hpylori is NEG will refer to GI

## 2021-04-19 DIAGNOSIS — E11.65 TYPE 2 DIABETES MELLITUS WITH HYPERGLYCEMIA, WITH LONG-TERM CURRENT USE OF INSULIN (HCC): ICD-10-CM

## 2021-04-19 DIAGNOSIS — Z79.4 TYPE 2 DIABETES MELLITUS WITH HYPERGLYCEMIA, WITH LONG-TERM CURRENT USE OF INSULIN (HCC): ICD-10-CM

## 2021-04-19 RX ORDER — DULAGLUTIDE 1.5 MG/.5ML
1.5 INJECTION, SOLUTION SUBCUTANEOUS WEEKLY
Qty: 3 ML | Refills: 1 | Status: SHIPPED | OUTPATIENT
Start: 2021-04-19 | End: 2021-06-09 | Stop reason: SDUPTHER

## 2021-04-19 NOTE — TELEPHONE ENCOUNTER
Patient came into the clinic requesting his Trulicity be sent to [facility],it is covered there,pended.

## 2021-05-12 ENCOUNTER — OFFICE VISIT (OUTPATIENT)
Dept: FAMILY MEDICINE CLINIC | Facility: CLINIC | Age: 46
End: 2021-05-12

## 2021-05-12 VITALS
BODY MASS INDEX: 37.62 KG/M2 | SYSTOLIC BLOOD PRESSURE: 126 MMHG | WEIGHT: 262.8 LBS | HEIGHT: 70 IN | TEMPERATURE: 97.3 F | HEART RATE: 98 BPM | OXYGEN SATURATION: 98 % | DIASTOLIC BLOOD PRESSURE: 78 MMHG

## 2021-05-12 DIAGNOSIS — F31.81 BIPOLAR II DISORDER (HCC): ICD-10-CM

## 2021-05-12 DIAGNOSIS — E66.01 MORBID (SEVERE) OBESITY DUE TO EXCESS CALORIES (HCC): ICD-10-CM

## 2021-05-12 DIAGNOSIS — I10 ESSENTIAL HYPERTENSION: ICD-10-CM

## 2021-05-12 DIAGNOSIS — G47.00 INSOMNIA, UNSPECIFIED TYPE: ICD-10-CM

## 2021-05-12 DIAGNOSIS — Z79.4 TYPE 2 DIABETES MELLITUS WITH HYPERGLYCEMIA, WITH LONG-TERM CURRENT USE OF INSULIN (HCC): ICD-10-CM

## 2021-05-12 DIAGNOSIS — F32.A DEPRESSION WITH SUICIDAL IDEATION: Primary | ICD-10-CM

## 2021-05-12 DIAGNOSIS — R45.851 DEPRESSION WITH SUICIDAL IDEATION: Primary | ICD-10-CM

## 2021-05-12 DIAGNOSIS — R51.9 CHRONIC DAILY HEADACHE: ICD-10-CM

## 2021-05-12 DIAGNOSIS — E11.65 TYPE 2 DIABETES MELLITUS WITH HYPERGLYCEMIA, WITH LONG-TERM CURRENT USE OF INSULIN (HCC): ICD-10-CM

## 2021-05-12 PROCEDURE — 99214 OFFICE O/P EST MOD 30 MIN: CPT | Performed by: NURSE PRACTITIONER

## 2021-05-12 RX ORDER — HYDROXYZINE PAMOATE 25 MG/1
50 CAPSULE ORAL NIGHTLY PRN
Qty: 30 CAPSULE | Refills: 0 | Status: SHIPPED | OUTPATIENT
Start: 2021-05-12 | End: 2021-06-09 | Stop reason: SDUPTHER

## 2021-06-09 ENCOUNTER — OFFICE VISIT (OUTPATIENT)
Dept: FAMILY MEDICINE CLINIC | Facility: CLINIC | Age: 46
End: 2021-06-09

## 2021-06-09 VITALS
TEMPERATURE: 96.8 F | WEIGHT: 267.8 LBS | BODY MASS INDEX: 38.34 KG/M2 | DIASTOLIC BLOOD PRESSURE: 88 MMHG | HEART RATE: 87 BPM | SYSTOLIC BLOOD PRESSURE: 128 MMHG | OXYGEN SATURATION: 99 % | HEIGHT: 70 IN

## 2021-06-09 DIAGNOSIS — R45.851 DEPRESSION WITH SUICIDAL IDEATION: Primary | ICD-10-CM

## 2021-06-09 DIAGNOSIS — E11.65 TYPE 2 DIABETES MELLITUS WITH HYPERGLYCEMIA, WITH LONG-TERM CURRENT USE OF INSULIN (HCC): ICD-10-CM

## 2021-06-09 DIAGNOSIS — G47.00 INSOMNIA, UNSPECIFIED TYPE: ICD-10-CM

## 2021-06-09 DIAGNOSIS — R51.9 CHRONIC DAILY HEADACHE: ICD-10-CM

## 2021-06-09 DIAGNOSIS — Z79.4 TYPE 2 DIABETES MELLITUS WITH HYPERGLYCEMIA, WITH LONG-TERM CURRENT USE OF INSULIN (HCC): ICD-10-CM

## 2021-06-09 DIAGNOSIS — F31.81 BIPOLAR II DISORDER (HCC): ICD-10-CM

## 2021-06-09 DIAGNOSIS — F32.A DEPRESSION WITH SUICIDAL IDEATION: Primary | ICD-10-CM

## 2021-06-09 PROCEDURE — 99214 OFFICE O/P EST MOD 30 MIN: CPT | Performed by: NURSE PRACTITIONER

## 2021-06-09 RX ORDER — DULAGLUTIDE 1.5 MG/.5ML
1.5 INJECTION, SOLUTION SUBCUTANEOUS WEEKLY
Qty: 9 ML | Refills: 1 | Status: SHIPPED | OUTPATIENT
Start: 2021-06-09 | End: 2021-06-30 | Stop reason: SDUPTHER

## 2021-06-09 RX ORDER — LANCETS 30 GAUGE
1 EACH MISCELLANEOUS DAILY
Qty: 100 EACH | Refills: 1 | Status: SHIPPED | OUTPATIENT
Start: 2021-06-09 | End: 2022-03-21

## 2021-06-09 RX ORDER — LISINOPRIL 5 MG/1
5 TABLET ORAL DAILY
Qty: 90 TABLET | Refills: 1 | Status: SHIPPED | OUTPATIENT
Start: 2021-06-09 | End: 2021-06-30 | Stop reason: SDUPTHER

## 2021-06-09 RX ORDER — CHLORAL HYDRATE 500 MG
1000 CAPSULE ORAL 2 TIMES DAILY WITH MEALS
Qty: 180 CAPSULE | Refills: 1 | Status: SHIPPED | OUTPATIENT
Start: 2021-06-09

## 2021-06-09 RX ORDER — HYDROXYZINE PAMOATE 25 MG/1
50 CAPSULE ORAL NIGHTLY PRN
Qty: 90 CAPSULE | Refills: 1 | Status: SHIPPED | OUTPATIENT
Start: 2021-06-09 | End: 2021-06-30 | Stop reason: SDUPTHER

## 2021-06-09 RX ORDER — ASPIRIN 81 MG/1
81 TABLET ORAL DAILY
Qty: 90 TABLET | Refills: 1 | Status: SHIPPED | OUTPATIENT
Start: 2021-06-09 | End: 2022-01-20 | Stop reason: SDUPTHER

## 2021-06-09 RX ORDER — FENOFIBRATE 145 MG/1
145 TABLET, COATED ORAL DAILY
Qty: 90 TABLET | Refills: 0 | Status: SHIPPED | OUTPATIENT
Start: 2021-06-09 | End: 2021-06-30 | Stop reason: SDUPTHER

## 2021-06-14 NOTE — PROGRESS NOTES
Subjective   Thomas Mireles is a 45 y.o. male.   Chief Compliant: The patient presents with Manic Behavior (possible referral to another counselor) and Insomnia    Diabetes  He presents for his follow-up diabetic visit. He has type 2 diabetes mellitus. No MedicAlert identification noted. The initial diagnosis of diabetes was made 9 years ago. His disease course has been improving. Hypoglycemia symptoms include headaches (chronic), mood changes and nervousness/anxiousness. Pertinent negatives for hypoglycemia include no confusion, dizziness, hunger, pallor, seizures, sleepiness, speech difficulty, sweats or tremors. There are no diabetic associated symptoms. Pertinent negatives for diabetes include no blurred vision, no foot paresthesias, no foot ulcerations, no polydipsia, no polyphagia, no polyuria and no weight loss. There are no hypoglycemic complications. Pertinent negatives for hypoglycemia complications include no blackouts, no hospitalization, no nocturnal hypoglycemia, no required assistance and no required glucagon injection. Symptoms are stable. Diabetic complications include impotence. Pertinent negatives for diabetic complications include no autonomic neuropathy, CVA, heart disease, nephropathy, peripheral neuropathy, PVD or retinopathy. Risk factors for coronary artery disease include obesity and stress. Current diabetic treatment includes insulin injections. He is compliant with treatment most of the time. He is currently taking insulin pre-breakfast. Insulin injections are given by patient. Rotation sites for injection include the abdominal wall. His weight is stable. He is following a diabetic diet. When asked about meal planning, he reported none. He has not had a previous visit with a dietitian. He participates in exercise daily. He monitors blood glucose at home 1-2 x per day. He monitors urine at home <1 x per month. Blood glucose monitoring compliance is adequate. There is no change in his home  blood glucose trend. His breakfast blood glucose is taken between 7-8 am. His breakfast blood glucose range is generally 130-140 mg/dl. His overall blood glucose range is 140-180 mg/dl. An ACE inhibitor/angiotensin II receptor blocker is not being taken. He does not see a podiatrist.Eye exam is current.   Depression  Visit Type: follow-up (Confusion with psych appt.  All telehealth would like to see someone local.  No improvements.)  Patient presents with the following symptoms: dizziness, excessive worry, impotence, insomnia (able to rest with vistaril), muscle tension, nervousness/anxiety and suicidal ideas.  Patient is not experiencing: confusion, decreased concentration, depressed mood, malaise, palpitations, shortness of breath, suicidal planning, thoughts of death, weight gain and weight loss.  Frequency of symptoms: most days   Severity: interfering with daily activities   Sleep quality: fair  Nighttime awakenings: occasional  Compliance with medications:  0-25%  Side effects:  Headaches and weight gain  Hypertension  This is a chronic problem. The current episode started more than 1 year ago. The problem is uncontrolled. Associated symptoms include headaches (chronic). Pertinent negatives include no anxiety, blurred vision, malaise/fatigue, neck pain, orthopnea, palpitations, peripheral edema, PND, shortness of breath or sweats. Risk factors for coronary artery disease include family history, obesity and smoking/tobacco exposure. Current antihypertension treatment includes ACE inhibitors. The current treatment provides moderate improvement. Compliance problems include exercise, psychosocial issues, medication side effects, medication cost and diet.  There is no history of CVA, PVD or retinopathy.   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  Since now has a daily headache can pinpoint the direct point of  pain daily.  Slight improvement with restart of meds ) problem. The current episode started more than 1 year ago. The problem occurs daily. The problem has been unchanged. The pain is located in the parietal region. The pain does not radiate. The pain quality is similar to prior headaches. The quality of the pain is described as aching, dull and throbbing. The pain is at a severity of 4/10. The pain is moderate. Associated symptoms include insomnia (able to rest with vistaril) and scalp tenderness. Pertinent negatives include no abnormal behavior, back pain, blurred vision, dizziness, drainage, ear pain, eye redness, eye watering, facial sweating, hearing loss, loss of balance, muscle aches, neck pain, phonophobia, photophobia, seizures, sinus pressure, tinnitus or weight loss. Associated symptoms comments: Pain is described as sharp quick buzz or sting in the back of his head.  Patient can pinpoint the direct area.  Pain is aggravating every aspect of his life and further worsening his anxiety and depression   . Nothing aggravates the symptoms. He has tried acetaminophen, Excedrin and NSAIDs (Seen chiropractor and neurology with multiple medication trials no changes) for the symptoms. The treatment provided mild relief. His past medical history is significant for hypertension.      The following portions of the patient's history were reviewed and updated as appropriate: allergies, current medications, past family history, past medical history, past social history, past surgical history and problem list.      Review of Systems   Constitutional: Negative for activity change, malaise/fatigue, weight gain and weight loss.   HENT: Negative for ear pain, hearing loss, sinus pressure and tinnitus.    Eyes: Negative for blurred vision, photophobia and redness.   Respiratory: Negative.  Negative for shortness of breath.    Cardiovascular: Negative.  Negative for palpitations, orthopnea and PND.   Endocrine: Negative for  "polydipsia, polyphagia and polyuria.   Genitourinary: Positive for impotence.   Musculoskeletal: Negative for back pain and neck pain.   Skin: Negative for pallor.   Neurological: Positive for headaches (chronic). Negative for dizziness, tremors, seizures, syncope, facial asymmetry, speech difficulty, light-headedness and loss of balance.   Psychiatric/Behavioral: Positive for suicidal ideas. Negative for agitation, behavioral problems, confusion, decreased concentration, self-injury and sleep disturbance. The patient is nervous/anxious and has insomnia (able to rest with vistaril).    All other systems reviewed and are negative.      Procedures    Vitals: Blood pressure 128/88, pulse 87, temperature 96.8 °F (36 °C), temperature source Temporal, height 177.8 cm (70\"), weight 121 kg (267 lb 12.8 oz), SpO2 99 %.     Allergies:   Allergies   Allergen Reactions   • Statins Other (See Comments)     All over pain,    • Zofran [Ondansetron] Nausea And Vomiting   • Zoloft [Sertraline Hcl] Nausea And Vomiting          Objective   Physical Exam   Constitutional: He is oriented to person, place, and time. He appears well-developed. No distress.   HENT:   Head: Normocephalic and atraumatic.   Right Ear: Hearing, tympanic membrane, external ear and ear canal normal.   Left Ear: Hearing, tympanic membrane, external ear and ear canal normal.   Nose: Nose normal. No mucosal edema or rhinorrhea. Right sinus exhibits no maxillary sinus tenderness and no frontal sinus tenderness. Left sinus exhibits no maxillary sinus tenderness and no frontal sinus tenderness.   Mouth/Throat: Uvula is midline. No oropharyngeal exudate. Tonsils are 0 on the right. Tonsils are 0 on the left. No tonsillar exudate.   Eyes: Pupils are equal, round, and reactive to light. Conjunctivae are normal. Right eye exhibits no discharge. Left eye exhibits no discharge.   Neck: No thyromegaly present.   Cardiovascular: Normal rate, regular rhythm and normal heart " sounds.   No murmur heard.  Pulmonary/Chest: Effort normal and breath sounds normal.   Musculoskeletal:      Right hip: Normal.      Left hip: Normal.      Right knee: Normal.      Cervical back: Normal.   Lymphadenopathy:     He has no cervical adenopathy.   Neurological: He is alert and oriented to person, place, and time. He has normal reflexes. He displays normal reflexes. No cranial nerve deficit or sensory deficit. He exhibits normal muscle tone. Coordination normal.   Skin: Skin is warm and dry. No rash noted. He is not diaphoretic. No erythema.          Psychiatric: His behavior is normal. Judgment and thought content normal.   Nursing note and vitals reviewed.      Assessment/Plan   Discussed with patient impression and plan, patient verbalizes understanding.        During this visit the following were done:  Labs Reviewed [x]    Labs Ordered []    Radiology Reports Reviewed []    Radiology Ordered []    PCP Records Reviewed []    Referring Provider Records Reviewed []    ER Records Reviewed []    Hospital Records Reviewed []    History Obtained From Family []    Radiology Images Reviewed []    Other Reviewed []    Records Requested [x]      Diagnoses and all orders for this visit:    1. Depression with suicidal ideation (Primary)  -     hydrOXYzine pamoate (Vistaril) 25 MG capsule; Take 2 capsules by mouth At Night As Needed for Itching.  Dispense: 90 capsule; Refill: 1  -     Ambulatory Referral to Psychiatry    2. Bipolar II disorder (CMS/HCC)  -     hydrOXYzine pamoate (Vistaril) 25 MG capsule; Take 2 capsules by mouth At Night As Needed for Itching.  Dispense: 90 capsule; Refill: 1  -     Ambulatory Referral to Psychiatry    3. Insomnia, unspecified type  -     hydrOXYzine pamoate (Vistaril) 25 MG capsule; Take 2 capsules by mouth At Night As Needed for Itching.  Dispense: 90 capsule; Refill: 1  -     Ambulatory Referral to Psychiatry    4. Type 2 diabetes mellitus with hyperglycemia, with long-term  current use of insulin (CMS/[facility])  -     Dulaglutide (Trulicity) 1.5 MG/0.5ML solution pen-injector; Inject 1.5 mg under the skin into the appropriate area as directed 1 (One) Time Per Week.  Dispense: 9 mL; Refill: 1  -     fenofibrate (TRICOR) 145 MG tablet; Take 1 tablet by mouth Daily.  Dispense: 90 tablet; Refill: 0  -     Lancets misc; 1 each Daily.  Dispense: 100 each; Refill: 1  -     lisinopril (PRINIVIL,ZESTRIL) 5 MG tablet; Take 1 tablet by mouth Daily.  Dispense: 90 tablet; Refill: 1  -     Omega-3 Fatty Acids (fish oil) 1000 MG capsule capsule; Take 1 capsule by mouth 2 (Two) Times a Day With Meals.  Dispense: 180 capsule; Refill: 1  -     glucose blood test strip; Use as instructed to check sugar once daily  Dispense: 100 each; Refill: 12    5. Chronic daily headache    Other orders  -     aspirin 81 MG EC tablet; Take 1 tablet by mouth Daily.  Dispense: 90 tablet; Refill: 1          Patient is a non smoker    Patient's Body mass index is 38.43 kg/m². BMI is above normal parameters. Recommendations include: exercise counseling and nutrition counseling.               Physical Exam  Vitals and nursing note reviewed.   Constitutional:       General: He is not in acute distress.     Appearance: He is well-developed. He is not diaphoretic.   HENT:      Head: Normocephalic and atraumatic.      Right Ear: Hearing, tympanic membrane, ear canal and external ear normal.      Left Ear: Hearing, tympanic membrane, ear canal and external ear normal.      Nose: Nose normal. No mucosal edema or rhinorrhea.      Right Sinus: No maxillary sinus tenderness or frontal sinus tenderness.      Left Sinus: No maxillary sinus tenderness or frontal sinus tenderness.      Mouth/Throat:      Pharynx: Uvula midline. No oropharyngeal exudate.      Tonsils: No tonsillar exudate. 0 on the right. 0 on the left.   Eyes:      General:         Right eye: No discharge.         Left eye: No discharge.      Conjunctiva/sclera: Conjunctivae  normal.      Pupils: Pupils are equal, round, and reactive to light.   Neck:      Thyroid: No thyromegaly.   Cardiovascular:      Rate and Rhythm: Normal rate and regular rhythm.      Heart sounds: Normal heart sounds. No murmur heard.     Pulmonary:      Effort: Pulmonary effort is normal.      Breath sounds: Normal breath sounds.   Musculoskeletal:      Cervical back: Neck supple. Normal.      Right hip: Normal.      Left hip: Normal.      Right knee: Normal.   Lymphadenopathy:      Cervical: No cervical adenopathy.   Skin:     General: Skin is warm and dry.      Findings: No erythema or rash.      Comments:        Neurological:      Mental Status: He is alert and oriented to person, place, and time.      Cranial Nerves: No cranial nerve deficit.      Sensory: No sensory deficit.      Motor: No abnormal muscle tone.      Coordination: Coordination normal.      Deep Tendon Reflexes: Reflexes are normal and symmetric. Reflexes normal.   Psychiatric:         Behavior: Behavior normal.         Thought Content: Thought content normal.         Judgment: Judgment normal.

## 2021-06-24 DIAGNOSIS — I10 ESSENTIAL HYPERTENSION: ICD-10-CM

## 2021-06-30 ENCOUNTER — OFFICE VISIT (OUTPATIENT)
Dept: FAMILY MEDICINE CLINIC | Facility: CLINIC | Age: 46
End: 2021-06-30

## 2021-06-30 VITALS
HEART RATE: 92 BPM | DIASTOLIC BLOOD PRESSURE: 74 MMHG | WEIGHT: 269 LBS | SYSTOLIC BLOOD PRESSURE: 100 MMHG | TEMPERATURE: 96.9 F | HEIGHT: 70 IN | OXYGEN SATURATION: 98 % | BODY MASS INDEX: 38.51 KG/M2

## 2021-06-30 DIAGNOSIS — R45.851 DEPRESSION WITH SUICIDAL IDEATION: ICD-10-CM

## 2021-06-30 DIAGNOSIS — M25.50 ARTHRALGIA, UNSPECIFIED JOINT: ICD-10-CM

## 2021-06-30 DIAGNOSIS — I10 ESSENTIAL HYPERTENSION: ICD-10-CM

## 2021-06-30 DIAGNOSIS — L71.9 ROSACEA: ICD-10-CM

## 2021-06-30 DIAGNOSIS — R10.13 DYSPEPSIA: ICD-10-CM

## 2021-06-30 DIAGNOSIS — E11.65 TYPE 2 DIABETES MELLITUS WITH HYPERGLYCEMIA, WITH LONG-TERM CURRENT USE OF INSULIN (HCC): ICD-10-CM

## 2021-06-30 DIAGNOSIS — F32.A DEPRESSION WITH SUICIDAL IDEATION: ICD-10-CM

## 2021-06-30 DIAGNOSIS — F31.81 BIPOLAR II DISORDER (HCC): Primary | ICD-10-CM

## 2021-06-30 DIAGNOSIS — G47.00 INSOMNIA, UNSPECIFIED TYPE: ICD-10-CM

## 2021-06-30 DIAGNOSIS — R51.9 CHRONIC DAILY HEADACHE: ICD-10-CM

## 2021-06-30 DIAGNOSIS — Z79.4 TYPE 2 DIABETES MELLITUS WITH HYPERGLYCEMIA, WITH LONG-TERM CURRENT USE OF INSULIN (HCC): ICD-10-CM

## 2021-06-30 PROCEDURE — 99214 OFFICE O/P EST MOD 30 MIN: CPT | Performed by: NURSE PRACTITIONER

## 2021-06-30 RX ORDER — FENOFIBRATE 145 MG/1
145 TABLET, COATED ORAL DAILY
Qty: 90 TABLET | Refills: 1 | Status: SHIPPED | OUTPATIENT
Start: 2021-06-30 | End: 2021-11-17 | Stop reason: SDUPTHER

## 2021-06-30 RX ORDER — TOPIRAMATE 100 MG/1
TABLET, FILM COATED ORAL
COMMUNITY
Start: 2021-06-24 | End: 2021-06-30

## 2021-06-30 RX ORDER — DULAGLUTIDE 1.5 MG/.5ML
1.5 INJECTION, SOLUTION SUBCUTANEOUS WEEKLY
Qty: 9 ML | Refills: 1 | Status: SHIPPED | OUTPATIENT
Start: 2021-06-30 | End: 2021-12-27 | Stop reason: SDUPTHER

## 2021-06-30 RX ORDER — ZIPRASIDONE HYDROCHLORIDE 60 MG/1
60 CAPSULE ORAL 2 TIMES DAILY
Qty: 180 CAPSULE | Refills: 1 | Status: CANCELLED | OUTPATIENT
Start: 2021-06-30

## 2021-06-30 RX ORDER — ERGOCALCIFEROL 1.25 MG/1
50000 CAPSULE ORAL
Qty: 5 CAPSULE | Refills: 2 | Status: SHIPPED | OUTPATIENT
Start: 2021-06-30 | End: 2021-11-17 | Stop reason: SDUPTHER

## 2021-06-30 RX ORDER — LISINOPRIL 5 MG/1
5 TABLET ORAL DAILY
Qty: 90 TABLET | Refills: 1 | Status: SHIPPED | OUTPATIENT
Start: 2021-06-30 | End: 2021-11-17 | Stop reason: SDUPTHER

## 2021-06-30 RX ORDER — HYDROXYZINE PAMOATE 25 MG/1
50 CAPSULE ORAL NIGHTLY PRN
Qty: 90 CAPSULE | Refills: 1 | Status: SHIPPED | OUTPATIENT
Start: 2021-06-30 | End: 2021-07-12 | Stop reason: SINTOL

## 2021-06-30 RX ORDER — PANTOPRAZOLE SODIUM 40 MG/1
40 TABLET, DELAYED RELEASE ORAL DAILY
Qty: 90 TABLET | Refills: 1 | Status: SHIPPED | OUTPATIENT
Start: 2021-06-30 | End: 2021-12-21

## 2021-06-30 RX ORDER — CYCLOBENZAPRINE HCL 5 MG
5 TABLET ORAL NIGHTLY PRN
Qty: 90 TABLET | Refills: 1 | Status: SHIPPED | OUTPATIENT
Start: 2021-06-30 | End: 2022-03-10

## 2021-06-30 NOTE — PROGRESS NOTES
Subjective   Thomas Mireles is a 45 y.o. male.   Chief Compliant: The patient presents with Follow-up and Depression    Diabetes  He presents for his follow-up diabetic visit. He has type 2 diabetes mellitus. No MedicAlert identification noted. The initial diagnosis of diabetes was made 9 years ago. His disease course has been improving. Hypoglycemia symptoms include headaches (chronic), mood changes and nervousness/anxiousness. Pertinent negatives for hypoglycemia include no confusion, dizziness, hunger, pallor, seizures, sleepiness, speech difficulty, sweats or tremors. There are no diabetic associated symptoms. Pertinent negatives for diabetes include no blurred vision, no foot paresthesias, no foot ulcerations, no polydipsia, no polyphagia, no polyuria and no weight loss. There are no hypoglycemic complications. Pertinent negatives for hypoglycemia complications include no blackouts, no hospitalization, no nocturnal hypoglycemia, no required assistance and no required glucagon injection. Symptoms are stable. Diabetic complications include impotence. Pertinent negatives for diabetic complications include no autonomic neuropathy, CVA, heart disease, nephropathy, peripheral neuropathy, PVD or retinopathy. Risk factors for coronary artery disease include obesity and stress. Current diabetic treatment includes insulin injections. He is compliant with treatment most of the time. He is currently taking insulin pre-breakfast. Insulin injections are given by patient. Rotation sites for injection include the abdominal wall. His weight is stable. He is following a diabetic diet. When asked about meal planning, he reported none. He has not had a previous visit with a dietitian. He participates in exercise daily. He monitors blood glucose at home 1-2 x per day. He monitors urine at home <1 x per month. Blood glucose monitoring compliance is adequate. There is no change in his home blood glucose trend. His breakfast blood  glucose is taken between 7-8 am. His breakfast blood glucose range is generally 130-140 mg/dl. His overall blood glucose range is 140-180 mg/dl. An ACE inhibitor/angiotensin II receptor blocker is not being taken. He does not see a podiatrist.Eye exam is current.   Depression  Visit Type: follow-up (No improvements. no better no worse.  Awaiting upcoming appt)  Patient presents with the following symptoms: dizziness, excessive worry, impotence, insomnia (able to rest with vistaril), muscle tension, nervousness/anxiety and suicidal ideas.  Patient is not experiencing: confusion, decreased concentration, depressed mood, malaise, palpitations, shortness of breath, suicidal planning, thoughts of death, weight gain and weight loss.  Frequency of symptoms: most days   Severity: interfering with daily activities   Sleep quality: fair  Nighttime awakenings: occasional  Compliance with medications:  0-25%  Side effects:  Headaches and weight gain  Hypertension  This is a chronic problem. The current episode started more than 1 year ago. The problem is uncontrolled. Associated symptoms include headaches (chronic). Pertinent negatives include no anxiety, blurred vision, malaise/fatigue, neck pain, orthopnea, palpitations, peripheral edema, PND, shortness of breath or sweats. Risk factors for coronary artery disease include family history, obesity and smoking/tobacco exposure. Current antihypertension treatment includes ACE inhibitors. The current treatment provides moderate improvement. Compliance problems include exercise, psychosocial issues, medication side effects, medication cost and diet.  There is no history of CVA, PVD or retinopathy.   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  Since now has a daily headache can pinpoint the direct point of pain daily.  Slight improvement with restart of meds ) problem. The current  episode started more than 1 year ago. The problem occurs daily. The problem has been unchanged. The pain is located in the parietal region. The pain does not radiate. The pain quality is similar to prior headaches. The quality of the pain is described as aching, dull and throbbing. The pain is at a severity of 4/10. The pain is moderate. Associated symptoms include insomnia (able to rest with vistaril) and scalp tenderness. Pertinent negatives include no abnormal behavior, back pain, blurred vision, dizziness, drainage, ear pain, eye redness, eye watering, facial sweating, hearing loss, loss of balance, muscle aches, neck pain, phonophobia, photophobia, seizures, sinus pressure, tinnitus or weight loss. Associated symptoms comments: Pain is described as sharp quick buzz or sting in the back of his head.  Patient can pinpoint the direct area.  Pain is aggravating every aspect of his life and further worsening his anxiety and depression   . Nothing aggravates the symptoms. He has tried acetaminophen, Excedrin and NSAIDs (Seen chiropractor and neurology with multiple medication trials no changes) for the symptoms. The treatment provided mild relief. His past medical history is significant for hypertension.      The following portions of the patient's history were reviewed and updated as appropriate: allergies, current medications, past family history, past medical history, past social history, past surgical history and problem list.      Review of Systems   Constitutional: Negative for activity change, malaise/fatigue, weight gain and weight loss.   HENT: Negative for ear pain, hearing loss, sinus pressure and tinnitus.    Eyes: Negative for blurred vision, photophobia and redness.   Respiratory: Negative.  Negative for shortness of breath.    Cardiovascular: Negative.  Negative for palpitations, orthopnea and PND.   Endocrine: Negative for polydipsia, polyphagia and polyuria.   Genitourinary: Positive for impotence.  "  Musculoskeletal: Negative for back pain and neck pain.   Skin: Negative for pallor.   Neurological: Positive for headaches (chronic). Negative for dizziness, tremors, seizures, syncope, facial asymmetry, speech difficulty, light-headedness and loss of balance.   Psychiatric/Behavioral: Positive for suicidal ideas. Negative for agitation, behavioral problems, confusion, decreased concentration, self-injury and sleep disturbance. The patient is nervous/anxious and has insomnia (able to rest with vistaril).    All other systems reviewed and are negative.      Procedures    Vitals: Blood pressure 100/74, pulse 92, temperature 96.9 °F (36.1 °C), height 177.8 cm (70\"), weight 122 kg (269 lb), SpO2 98 %.     Allergies:   Allergies   Allergen Reactions   • Statins Other (See Comments)     All over pain,    • Zofran [Ondansetron] Nausea And Vomiting   • Zoloft [Sertraline Hcl] Nausea And Vomiting          Objective   Physical Exam   Constitutional: He is oriented to person, place, and time. He appears well-developed. No distress.   HENT:   Head: Normocephalic and atraumatic.   Right Ear: Hearing, tympanic membrane, external ear and ear canal normal.   Left Ear: Hearing, tympanic membrane, external ear and ear canal normal.   Nose: Nose normal. No mucosal edema or rhinorrhea. Right sinus exhibits no maxillary sinus tenderness and no frontal sinus tenderness. Left sinus exhibits no maxillary sinus tenderness and no frontal sinus tenderness.   Mouth/Throat: Uvula is midline. No oropharyngeal exudate. Tonsils are 0 on the right. Tonsils are 0 on the left. No tonsillar exudate.   Eyes: Pupils are equal, round, and reactive to light. Conjunctivae are normal. Right eye exhibits no discharge. Left eye exhibits no discharge.   Neck: No thyromegaly present.   Cardiovascular: Normal rate, regular rhythm and normal heart sounds.   No murmur heard.  Pulmonary/Chest: Effort normal and breath sounds normal.   Musculoskeletal:      Right " hip: Normal.      Left hip: Normal.      Right knee: Normal.      Cervical back: Normal.   Lymphadenopathy:     He has no cervical adenopathy.   Neurological: He is alert and oriented to person, place, and time. He has normal reflexes. He displays normal reflexes. No cranial nerve deficit or sensory deficit. He exhibits normal muscle tone. Coordination normal.   Skin: Skin is warm and dry. No rash noted. He is not diaphoretic. No erythema.          Psychiatric: His behavior is normal. Judgment and thought content normal.   Nursing note and vitals reviewed.      Assessment/Plan   Discussed with patient impression and plan, patient verbalizes understanding.        During this visit the following were done:  Labs Reviewed []    Labs Ordered []    Radiology Reports Reviewed []    Radiology Ordered []    PCP Records Reviewed []    Referring Provider Records Reviewed []    ER Records Reviewed []    Hospital Records Reviewed []    History Obtained From Family []    Radiology Images Reviewed []    Other Reviewed []    Records Requested [x]      Diagnoses and all orders for this visit:    1. Bipolar II disorder (CMS/HCC) (Primary)  -     hydrOXYzine pamoate (Vistaril) 25 MG capsule; Take 2 capsules by mouth At Night As Needed for Itching.  Dispense: 90 capsule; Refill: 1    2. Arthralgia, unspecified joint  -     cyclobenzaprine (FLEXERIL) 5 MG tablet; Take 1 tablet by mouth At Night As Needed for Muscle Spasms.  Dispense: 90 tablet; Refill: 1    3. Chronic daily headache  -     cyclobenzaprine (FLEXERIL) 5 MG tablet; Take 1 tablet by mouth At Night As Needed for Muscle Spasms.  Dispense: 90 tablet; Refill: 1    4. Depression with suicidal ideation  -     hydrOXYzine pamoate (Vistaril) 25 MG capsule; Take 2 capsules by mouth At Night As Needed for Itching.  Dispense: 90 capsule; Refill: 1    5. Insomnia, unspecified type  -     hydrOXYzine pamoate (Vistaril) 25 MG capsule; Take 2 capsules by mouth At Night As Needed for  Itching.  Dispense: 90 capsule; Refill: 1    6. Dyspepsia  -     pantoprazole (PROTONIX) 40 MG EC tablet; Take 1 tablet by mouth Daily.  Dispense: 90 tablet; Refill: 1    7. Essential hypertension  -     metoprolol tartrate (LOPRESSOR) 25 MG tablet; Take 1 tablet by mouth Every 12 (Twelve) Hours.  Dispense: 180 tablet; Refill: 1    8. Rosacea    9. Type 2 diabetes mellitus with hyperglycemia, with long-term current use of insulin (CMS/[facility])  -     Dulaglutide (Trulicity) 1.5 MG/0.5ML solution pen-injector; Inject 1.5 mg under the skin into the appropriate area as directed 1 (One) Time Per Week.  Dispense: 9 mL; Refill: 1  -     fenofibrate (TRICOR) 145 MG tablet; Take 1 tablet by mouth Daily.  Dispense: 90 tablet; Refill: 1  -     lisinopril (PRINIVIL,ZESTRIL) 5 MG tablet; Take 1 tablet by mouth Daily.  Dispense: 90 tablet; Refill: 1    Other orders  -     Cancel: ziprasidone (GEODON) 60 MG capsule; Take 1 capsule by mouth twice a day  Dispense: 180 capsule; Refill: 1  -     vitamin D (ERGOCALCIFEROL) 1.25 MG (50796 UT) capsule capsule; Take 1 capsule by mouth Every 7 (Seven) Days.  Dispense: 5 capsule; Refill: 2          Patient is a non smoker    Patient's Body mass index is 38.6 kg/m². BMI is above normal parameters. Recommendations include: exercise counseling and nutrition counseling.               Physical Exam  Vitals and nursing note reviewed.   Constitutional:       General: He is not in acute distress.     Appearance: He is well-developed. He is not diaphoretic.   HENT:      Head: Normocephalic and atraumatic.      Right Ear: Hearing, tympanic membrane, ear canal and external ear normal.      Left Ear: Hearing, tympanic membrane, ear canal and external ear normal.      Nose: Nose normal. No mucosal edema or rhinorrhea.      Right Sinus: No maxillary sinus tenderness or frontal sinus tenderness.      Left Sinus: No maxillary sinus tenderness or frontal sinus tenderness.      Mouth/Throat:      Pharynx: Uvula  midline. No oropharyngeal exudate.      Tonsils: No tonsillar exudate. 0 on the right. 0 on the left.   Eyes:      General:         Right eye: No discharge.         Left eye: No discharge.      Conjunctiva/sclera: Conjunctivae normal.      Pupils: Pupils are equal, round, and reactive to light.   Neck:      Thyroid: No thyromegaly.   Cardiovascular:      Rate and Rhythm: Normal rate and regular rhythm.      Heart sounds: Normal heart sounds. No murmur heard.     Pulmonary:      Effort: Pulmonary effort is normal.      Breath sounds: Normal breath sounds.   Musculoskeletal:      Cervical back: Neck supple. Normal.      Right hip: Normal.      Left hip: Normal.      Right knee: Normal.   Lymphadenopathy:      Cervical: No cervical adenopathy.   Skin:     General: Skin is warm and dry.      Findings: No erythema or rash.      Comments:        Neurological:      Mental Status: He is alert and oriented to person, place, and time.      Cranial Nerves: No cranial nerve deficit.      Sensory: No sensory deficit.      Motor: No abnormal muscle tone.      Coordination: Coordination normal.      Deep Tendon Reflexes: Reflexes are normal and symmetric. Reflexes normal.   Psychiatric:         Behavior: Behavior normal.         Thought Content: Thought content normal.         Judgment: Judgment normal.

## 2021-07-01 ENCOUNTER — OFFICE VISIT (OUTPATIENT)
Dept: GASTROENTEROLOGY | Facility: CLINIC | Age: 46
End: 2021-07-01

## 2021-07-01 VITALS
BODY MASS INDEX: 38.68 KG/M2 | DIASTOLIC BLOOD PRESSURE: 93 MMHG | HEART RATE: 86 BPM | SYSTOLIC BLOOD PRESSURE: 154 MMHG | WEIGHT: 270.2 LBS | HEIGHT: 70 IN

## 2021-07-01 DIAGNOSIS — Z12.11 ENCOUNTER FOR COLORECTAL CANCER SCREENING: Primary | ICD-10-CM

## 2021-07-01 DIAGNOSIS — Z01.818 PREOPERATIVE CLEARANCE: ICD-10-CM

## 2021-07-01 DIAGNOSIS — K59.04 CHRONIC IDIOPATHIC CONSTIPATION: ICD-10-CM

## 2021-07-01 DIAGNOSIS — Z12.12 ENCOUNTER FOR COLORECTAL CANCER SCREENING: Primary | ICD-10-CM

## 2021-07-01 PROCEDURE — 99204 OFFICE O/P NEW MOD 45 MIN: CPT | Performed by: INTERNAL MEDICINE

## 2021-07-01 RX ORDER — POLYETHYLENE GLYCOL 3350 17 G/17G
17 POWDER, FOR SOLUTION ORAL DAILY
Qty: 510 G | Refills: 5 | Status: SHIPPED | OUTPATIENT
Start: 2021-07-01 | End: 2022-02-22

## 2021-07-01 NOTE — PROGRESS NOTES
Subjective     Thomas Mireles is a 45 y.o. male who presents to the office today as a consultation from LAUREN Majano for evaluation of Constipation        History of Present Illness:  The patient presents for consultation constipation.  He states he began to notice a change in his bowel pattern in Feb.  Started with diarrhea and slowly progressed to constipation.  He is having a bowel movement about 3 times a week.  He does empty well when he has a bm.  He has not tried any medications lately. His stool is a number 3 on the [place] stool scale.  He is diabetic.  He is eating once to twice daily.  He is gaining weight.  No family history of colon cancer.  No BRBPR.  He complains of bloating and gassiness.  Abdominal discomfort at times but no pain.        Review of Systems:  Review of Systems   Constitutional: Negative for fever.   HENT: Negative for trouble swallowing.    Eyes: Negative.    Respiratory: Negative for chest tightness.    Cardiovascular: Negative for chest pain.   Gastrointestinal: Positive for abdominal distention, abdominal pain, constipation, nausea and vomiting. Negative for anal bleeding, blood in stool, diarrhea and rectal pain.   Endocrine: Negative.    Genitourinary: Negative for difficulty urinating.   Musculoskeletal: Positive for back pain.   Skin: Negative.    Allergic/Immunologic: Negative for environmental allergies and food allergies.   Neurological: Negative for headaches.   Hematological: Does not bruise/bleed easily.   Psychiatric/Behavioral: Negative.        Past Medical History:  Past Medical History:   Diagnosis Date   • Anxiety    • Bipolar 1 disorder (CMS/HCC)    • Depression    • Head injury 2016   • HL (hearing loss)    • Hyperlipidemia    • Hypertension    • Kidney stone 01/25/18   • Migraine    • Obesity    • Psychiatric illness    • Type 2 diabetes mellitus (CMS/[facility])        Past Surgical History:  Past Surgical History:   Procedure Laterality Date   • KNEE SURGERY  1992     right   • NASAL POLYP EXCISION     • URETHRAL DILATATION      infant       Family History:  Family History   Problem Relation Age of Onset   • Hypertension Mother    • Hypertension Father    • Diabetes Maternal Grandfather    • Heart disease Maternal Grandfather    • Mental illness Maternal Grandfather         PTSD   • Liver cancer Paternal Grandfather    • Asthma Paternal Grandfather          in .   • Diabetes Paternal Grandfather        Social History:  Social History     Socioeconomic History   • Marital status: Single     Spouse name: Not on file   • Number of children: Not on file   • Years of education: Not on file   • Highest education level: Not on file   Tobacco Use   • Smoking status: Former Smoker     Packs/day: 2.00     Years: 15.00     Pack years: 30.00     Start date: 1993     Quit date: 3/6/2009     Years since quittin.3   • Smokeless tobacco: Never Used   Substance and Sexual Activity   • Alcohol use: Yes     Alcohol/week: 1.0 standard drinks     Types: 1 Cans of beer per week     Comment: Very seldom.   • Drug use: No   • Sexual activity: Not Currently     Partners: Female     Birth control/protection: Condom       Current Medication List:    Current Outpatient Medications:   •  aspirin 81 MG EC tablet, Take 1 tablet by mouth Daily., Disp: 90 tablet, Rfl: 1  •  butalbital-aspirin-caffeine (FIORINAL) -40 MG per capsule, Take 1 capsule by mouth 2 (Two) Times a Day As Needed for Headache., Disp: 40 capsule, Rfl: 0  •  cyclobenzaprine (FLEXERIL) 5 MG tablet, Take 1 tablet by mouth At Night As Needed for Muscle Spasms., Disp: 90 tablet, Rfl: 1  •  Dulaglutide (Trulicity) 1.5 MG/0.5ML solution pen-injector, Inject 1.5 mg under the skin into the appropriate area as directed 1 (One) Time Per Week., Disp: 9 mL, Rfl: 1  •  fenofibrate (TRICOR) 145 MG tablet, Take 1 tablet by mouth Daily., Disp: 90 tablet, Rfl: 1  •  glucose blood test strip, For daily testing  E11.65, Disp: 100  "each, Rfl: 11  •  glucose blood test strip, Use as instructed to check sugar once daily, Disp: 100 each, Rfl: 12  •  glucose monitor monitoring kit, 1 each As Needed (use to check sugar once daily)., Disp: 1 each, Rfl: 0  •  hydrOXYzine pamoate (Vistaril) 25 MG capsule, Take 2 capsules by mouth At Night As Needed for Itching., Disp: 90 capsule, Rfl: 1  •  Lancets misc, 1 each Daily., Disp: 100 each, Rfl: 1  •  lisinopril (PRINIVIL,ZESTRIL) 5 MG tablet, Take 1 tablet by mouth Daily., Disp: 90 tablet, Rfl: 1  •  metoprolol tartrate (LOPRESSOR) 25 MG tablet, Take 1 tablet by mouth Every 12 (Twelve) Hours., Disp: 180 tablet, Rfl: 1  •  metroNIDAZOLE (METROGEL) 0.75 % gel, Apply  topically to the appropriate area as directed 2 (Two) Times a Day., Disp: 45 g, Rfl: 5  •  naproxen (Naprosyn) 500 MG tablet, Take 1 tablet by mouth 2 (Two) Times a Day With Meals., Disp: 180 tablet, Rfl: 1  •  Omega-3 Fatty Acids (fish oil) 1000 MG capsule capsule, Take 1 capsule by mouth 2 (Two) Times a Day With Meals., Disp: 180 capsule, Rfl: 1  •  pantoprazole (PROTONIX) 40 MG EC tablet, Take 1 tablet by mouth Daily., Disp: 90 tablet, Rfl: 1  •  vitamin D (ERGOCALCIFEROL) 1.25 MG (47434 UT) capsule capsule, Take 1 capsule by mouth Every 7 (Seven) Days., Disp: 5 capsule, Rfl: 2  •  ziprasidone (GEODON) 60 MG capsule, Take 1 capsule by mouth twice a day, Disp: 180 capsule, Rfl: 1  •  magnesium citrate solution, Take 296 mL by mouth Take As Directed. Follow bowel prep instructions given at office, Disp: 592 mL, Rfl: 0  •  polyethylene glycol (MIRALAX) 17 GM/SCOOP powder, Take 17 g by mouth Daily. Mix with 8 oz liquid, Disp: 510 g, Rfl: 5    Allergies:   Statins, Zofran [ondansetron], and Zoloft [sertraline hcl]    Vitals:  /93 (BP Location: Left arm, Patient Position: Sitting, Cuff Size: Adult)   Pulse 86   Ht 177.8 cm (70\")   Wt 123 kg (270 lb 3.2 oz)   BMI 38.77 kg/m²     Physical Exam:  Physical Exam  Constitutional:       " Appearance: He is obese.   HENT:      Head: Normocephalic and atraumatic.      Nose: Nose normal. No congestion or rhinorrhea.   Eyes:      General: No scleral icterus.     Extraocular Movements: Extraocular movements intact.      Conjunctiva/sclera: Conjunctivae normal.      Pupils: Pupils are equal, round, and reactive to light.   Cardiovascular:      Rate and Rhythm: Normal rate and regular rhythm.      Pulses: Normal pulses.      Heart sounds: Normal heart sounds.   Pulmonary:      Effort: Pulmonary effort is normal.      Breath sounds: Normal breath sounds.   Abdominal:      General: Abdomen is flat. Bowel sounds are normal. There is no distension.      Palpations: Abdomen is soft. There is no shifting dullness, fluid wave, hepatomegaly, splenomegaly, mass or pulsatile mass.      Tenderness: There is no abdominal tenderness. There is no guarding or rebound.      Hernia: No hernia is present.   Musculoskeletal:         General: No swelling or tenderness.      Cervical back: Normal range of motion and neck supple.   Skin:     General: Skin is warm and dry.      Coloration: Skin is not jaundiced.   Neurological:      General: No focal deficit present.      Mental Status: He is alert and oriented to person, place, and time.   Psychiatric:         Mood and Affect: Mood normal.         Behavior: Behavior normal.         Results Review:  Lab Results:   No visits with results within 1 Month(s) from this visit.   Latest known visit with results is:   Office Visit on 04/13/2021   Component Date Value Ref Range Status   • H. pylori, IgA ABS 04/13/2021 <9.0  0.0 - 8.9 units Final                                    Negative          <9.0                                  Equivocal   9.0 - 11.0                                  Positive         >11.0   • H. pylori IgG 04/13/2021 0.13  0.00 - 0.79 Index Value Final                                 Negative           <0.80                               Equivocal    0.80 - 0.89                                Positive           >0.89       Assessment/Plan     Visit Diagnoses:    ICD-10-CM ICD-9-CM   1. Encounter for colorectal cancer screening  Z12.11 V76.51    Z12.12 V76.41   2. Chronic idiopathic constipation  K59.04 564.00       Plan:  I will have the patient undergo colonoscopy for screening purposes.  He will start MiraLAX 17 g in 8 ounces of fluid once daily for constipation.  He will follow-up after his colonoscopy.    COLONOSCOPY (N/A)      MEDICATION ISSUES:  Discussed medication options and treatment plan of prescribed medication as well as the risks, benefits, and side effects including potential falls, possible impaired driving and metabolic adversities among others. Patient is agreeable to call the office with any worsening of symptoms or onset of side effects. Patient is agreeable to call 911 or go to the nearest ER should he/she begin having SI/HI.     MEDS ORDERED DURING VISIT:  New Medications Ordered This Visit   Medications   • polyethylene glycol (MIRALAX) 17 GM/SCOOP powder     Sig: Take 17 g by mouth Daily. Mix with 8 oz liquid     Dispense:  510 g     Refill:  5   • magnesium citrate solution     Sig: Take 296 mL by mouth Take As Directed. Follow bowel prep instructions given at office     Dispense:  592 mL     Refill:  0       Follow-up after colonoscopy.             This document has been electronically signed by Judy Grant MD   July 1, 2021 11:50 EDT        Part of this note may be an electronic transcription/translation of spoken language to printed text using the Dragon Dictation System.

## 2021-07-01 NOTE — H&P (VIEW-ONLY)
Subjective     Thomas Mireles is a 45 y.o. male who presents to the office today as a consultation from LAUREN Majano for evaluation of Constipation        History of Present Illness:  The patient presents for consultation constipation.  He states he began to notice a change in his bowel pattern in Feb.  Started with diarrhea and slowly progressed to constipation.  He is having a bowel movement about 3 times a week.  He does empty well when he has a bm.  He has not tried any medications lately. His stool is a number 3 on the [place] stool scale.  He is diabetic.  He is eating once to twice daily.  He is gaining weight.  No family history of colon cancer.  No BRBPR.  He complains of bloating and gassiness.  Abdominal discomfort at times but no pain.        Review of Systems:  Review of Systems   Constitutional: Negative for fever.   HENT: Negative for trouble swallowing.    Eyes: Negative.    Respiratory: Negative for chest tightness.    Cardiovascular: Negative for chest pain.   Gastrointestinal: Positive for abdominal distention, abdominal pain, constipation, nausea and vomiting. Negative for anal bleeding, blood in stool, diarrhea and rectal pain.   Endocrine: Negative.    Genitourinary: Negative for difficulty urinating.   Musculoskeletal: Positive for back pain.   Skin: Negative.    Allergic/Immunologic: Negative for environmental allergies and food allergies.   Neurological: Negative for headaches.   Hematological: Does not bruise/bleed easily.   Psychiatric/Behavioral: Negative.        Past Medical History:  Past Medical History:   Diagnosis Date   • Anxiety    • Bipolar 1 disorder (CMS/HCC)    • Depression    • Head injury 2016   • HL (hearing loss)    • Hyperlipidemia    • Hypertension    • Kidney stone 01/25/18   • Migraine    • Obesity    • Psychiatric illness    • Type 2 diabetes mellitus (CMS/[facility])        Past Surgical History:  Past Surgical History:   Procedure Laterality Date   • KNEE SURGERY  1992     right   • NASAL POLYP EXCISION     • URETHRAL DILATATION      infant       Family History:  Family History   Problem Relation Age of Onset   • Hypertension Mother    • Hypertension Father    • Diabetes Maternal Grandfather    • Heart disease Maternal Grandfather    • Mental illness Maternal Grandfather         PTSD   • Liver cancer Paternal Grandfather    • Asthma Paternal Grandfather          in .   • Diabetes Paternal Grandfather        Social History:  Social History     Socioeconomic History   • Marital status: Single     Spouse name: Not on file   • Number of children: Not on file   • Years of education: Not on file   • Highest education level: Not on file   Tobacco Use   • Smoking status: Former Smoker     Packs/day: 2.00     Years: 15.00     Pack years: 30.00     Start date: 1993     Quit date: 3/6/2009     Years since quittin.3   • Smokeless tobacco: Never Used   Substance and Sexual Activity   • Alcohol use: Yes     Alcohol/week: 1.0 standard drinks     Types: 1 Cans of beer per week     Comment: Very seldom.   • Drug use: No   • Sexual activity: Not Currently     Partners: Female     Birth control/protection: Condom       Current Medication List:    Current Outpatient Medications:   •  aspirin 81 MG EC tablet, Take 1 tablet by mouth Daily., Disp: 90 tablet, Rfl: 1  •  butalbital-aspirin-caffeine (FIORINAL) -40 MG per capsule, Take 1 capsule by mouth 2 (Two) Times a Day As Needed for Headache., Disp: 40 capsule, Rfl: 0  •  cyclobenzaprine (FLEXERIL) 5 MG tablet, Take 1 tablet by mouth At Night As Needed for Muscle Spasms., Disp: 90 tablet, Rfl: 1  •  Dulaglutide (Trulicity) 1.5 MG/0.5ML solution pen-injector, Inject 1.5 mg under the skin into the appropriate area as directed 1 (One) Time Per Week., Disp: 9 mL, Rfl: 1  •  fenofibrate (TRICOR) 145 MG tablet, Take 1 tablet by mouth Daily., Disp: 90 tablet, Rfl: 1  •  glucose blood test strip, For daily testing  E11.65, Disp: 100  "each, Rfl: 11  •  glucose blood test strip, Use as instructed to check sugar once daily, Disp: 100 each, Rfl: 12  •  glucose monitor monitoring kit, 1 each As Needed (use to check sugar once daily)., Disp: 1 each, Rfl: 0  •  hydrOXYzine pamoate (Vistaril) 25 MG capsule, Take 2 capsules by mouth At Night As Needed for Itching., Disp: 90 capsule, Rfl: 1  •  Lancets misc, 1 each Daily., Disp: 100 each, Rfl: 1  •  lisinopril (PRINIVIL,ZESTRIL) 5 MG tablet, Take 1 tablet by mouth Daily., Disp: 90 tablet, Rfl: 1  •  metoprolol tartrate (LOPRESSOR) 25 MG tablet, Take 1 tablet by mouth Every 12 (Twelve) Hours., Disp: 180 tablet, Rfl: 1  •  metroNIDAZOLE (METROGEL) 0.75 % gel, Apply  topically to the appropriate area as directed 2 (Two) Times a Day., Disp: 45 g, Rfl: 5  •  naproxen (Naprosyn) 500 MG tablet, Take 1 tablet by mouth 2 (Two) Times a Day With Meals., Disp: 180 tablet, Rfl: 1  •  Omega-3 Fatty Acids (fish oil) 1000 MG capsule capsule, Take 1 capsule by mouth 2 (Two) Times a Day With Meals., Disp: 180 capsule, Rfl: 1  •  pantoprazole (PROTONIX) 40 MG EC tablet, Take 1 tablet by mouth Daily., Disp: 90 tablet, Rfl: 1  •  vitamin D (ERGOCALCIFEROL) 1.25 MG (20378 UT) capsule capsule, Take 1 capsule by mouth Every 7 (Seven) Days., Disp: 5 capsule, Rfl: 2  •  ziprasidone (GEODON) 60 MG capsule, Take 1 capsule by mouth twice a day, Disp: 180 capsule, Rfl: 1  •  magnesium citrate solution, Take 296 mL by mouth Take As Directed. Follow bowel prep instructions given at office, Disp: 592 mL, Rfl: 0  •  polyethylene glycol (MIRALAX) 17 GM/SCOOP powder, Take 17 g by mouth Daily. Mix with 8 oz liquid, Disp: 510 g, Rfl: 5    Allergies:   Statins, Zofran [ondansetron], and Zoloft [sertraline hcl]    Vitals:  /93 (BP Location: Left arm, Patient Position: Sitting, Cuff Size: Adult)   Pulse 86   Ht 177.8 cm (70\")   Wt 123 kg (270 lb 3.2 oz)   BMI 38.77 kg/m²     Physical Exam:  Physical Exam  Constitutional:       " Appearance: He is obese.   HENT:      Head: Normocephalic and atraumatic.      Nose: Nose normal. No congestion or rhinorrhea.   Eyes:      General: No scleral icterus.     Extraocular Movements: Extraocular movements intact.      Conjunctiva/sclera: Conjunctivae normal.      Pupils: Pupils are equal, round, and reactive to light.   Cardiovascular:      Rate and Rhythm: Normal rate and regular rhythm.      Pulses: Normal pulses.      Heart sounds: Normal heart sounds.   Pulmonary:      Effort: Pulmonary effort is normal.      Breath sounds: Normal breath sounds.   Abdominal:      General: Abdomen is flat. Bowel sounds are normal. There is no distension.      Palpations: Abdomen is soft. There is no shifting dullness, fluid wave, hepatomegaly, splenomegaly, mass or pulsatile mass.      Tenderness: There is no abdominal tenderness. There is no guarding or rebound.      Hernia: No hernia is present.   Musculoskeletal:         General: No swelling or tenderness.      Cervical back: Normal range of motion and neck supple.   Skin:     General: Skin is warm and dry.      Coloration: Skin is not jaundiced.   Neurological:      General: No focal deficit present.      Mental Status: He is alert and oriented to person, place, and time.   Psychiatric:         Mood and Affect: Mood normal.         Behavior: Behavior normal.         Results Review:  Lab Results:   No visits with results within 1 Month(s) from this visit.   Latest known visit with results is:   Office Visit on 04/13/2021   Component Date Value Ref Range Status   • H. pylori, IgA ABS 04/13/2021 <9.0  0.0 - 8.9 units Final                                    Negative          <9.0                                  Equivocal   9.0 - 11.0                                  Positive         >11.0   • H. pylori IgG 04/13/2021 0.13  0.00 - 0.79 Index Value Final                                 Negative           <0.80                               Equivocal    0.80 - 0.89                                Positive           >0.89       Assessment/Plan     Visit Diagnoses:    ICD-10-CM ICD-9-CM   1. Encounter for colorectal cancer screening  Z12.11 V76.51    Z12.12 V76.41   2. Chronic idiopathic constipation  K59.04 564.00       Plan:  I will have the patient undergo colonoscopy for screening purposes.  He will start MiraLAX 17 g in 8 ounces of fluid once daily for constipation.  He will follow-up after his colonoscopy.    COLONOSCOPY (N/A)      MEDICATION ISSUES:  Discussed medication options and treatment plan of prescribed medication as well as the risks, benefits, and side effects including potential falls, possible impaired driving and metabolic adversities among others. Patient is agreeable to call the office with any worsening of symptoms or onset of side effects. Patient is agreeable to call 911 or go to the nearest ER should he/she begin having SI/HI.     MEDS ORDERED DURING VISIT:  New Medications Ordered This Visit   Medications   • polyethylene glycol (MIRALAX) 17 GM/SCOOP powder     Sig: Take 17 g by mouth Daily. Mix with 8 oz liquid     Dispense:  510 g     Refill:  5   • magnesium citrate solution     Sig: Take 296 mL by mouth Take As Directed. Follow bowel prep instructions given at office     Dispense:  592 mL     Refill:  0       Follow-up after colonoscopy.             This document has been electronically signed by Judy Grant MD   July 1, 2021 11:50 EDT        Part of this note may be an electronic transcription/translation of spoken language to printed text using the Dragon Dictation System.

## 2021-07-02 ENCOUNTER — HOSPITAL ENCOUNTER (EMERGENCY)
Facility: HOSPITAL | Age: 46
Discharge: HOME | End: 2021-07-02
Attending: EMERGENCY MEDICINE | Admitting: EMERGENCY MEDICINE

## 2021-07-02 ENCOUNTER — APPOINTMENT (OUTPATIENT)
Dept: CT IMAGING | Facility: HOSPITAL | Age: 46
End: 2021-07-02

## 2021-07-02 VITALS
DIASTOLIC BLOOD PRESSURE: 89 MMHG | BODY MASS INDEX: 38.65 KG/M2 | TEMPERATURE: 98 F | HEART RATE: 108 BPM | OXYGEN SATURATION: 100 % | SYSTOLIC BLOOD PRESSURE: 141 MMHG | RESPIRATION RATE: 18 BRPM | HEIGHT: 70 IN | WEIGHT: 270 LBS

## 2021-07-02 DIAGNOSIS — N42.0 PROSTATE CALCULUS: Primary | ICD-10-CM

## 2021-07-02 LAB
ALBUMIN SERPL-MCNC: 4.89 G/DL (ref 3.5–5.2)
ALBUMIN/GLOB SERPL: 1.3 G/DL
ALP SERPL-CCNC: 65 U/L (ref 39–117)
ALT SERPL W P-5'-P-CCNC: 20 U/L (ref 1–41)
ANION GAP SERPL CALCULATED.3IONS-SCNC: 12.6 MMOL/L (ref 5–15)
AST SERPL-CCNC: 23 U/L (ref 1–40)
BACTERIA UR QL AUTO: ABNORMAL /HPF
BASOPHILS # BLD AUTO: 0.06 10*3/MM3 (ref 0–0.2)
BASOPHILS NFR BLD AUTO: 0.5 % (ref 0–1.5)
BILIRUB SERPL-MCNC: 0.5 MG/DL (ref 0–1.2)
BILIRUB UR QL STRIP: NEGATIVE
BUN SERPL-MCNC: 19 MG/DL (ref 6–20)
BUN/CREAT SERPL: 14.5 (ref 7–25)
CALCIUM SPEC-SCNC: 11.3 MG/DL (ref 8.6–10.5)
CHLORIDE SERPL-SCNC: 100 MMOL/L (ref 98–107)
CLARITY UR: CLEAR
CO2 SERPL-SCNC: 26.4 MMOL/L (ref 22–29)
COLOR UR: ABNORMAL
CREAT SERPL-MCNC: 1.31 MG/DL (ref 0.76–1.27)
CRP SERPL-MCNC: <0.3 MG/DL (ref 0–0.5)
DEPRECATED RDW RBC AUTO: 38.1 FL (ref 37–54)
EOSINOPHIL # BLD AUTO: 0.21 10*3/MM3 (ref 0–0.4)
EOSINOPHIL NFR BLD AUTO: 1.8 % (ref 0.3–6.2)
ERYTHROCYTE [DISTWIDTH] IN BLOOD BY AUTOMATED COUNT: 12.6 % (ref 12.3–15.4)
ERYTHROCYTE [SEDIMENTATION RATE] IN BLOOD: 28 MM/HR (ref 0–15)
GFR SERPL CREATININE-BSD FRML MDRD: 59 ML/MIN/1.73
GLOBULIN UR ELPH-MCNC: 3.9 GM/DL
GLUCOSE SERPL-MCNC: 154 MG/DL (ref 65–99)
GLUCOSE UR STRIP-MCNC: ABNORMAL MG/DL
HCT VFR BLD AUTO: 46.1 % (ref 37.5–51)
HGB BLD-MCNC: 16 G/DL (ref 13–17.7)
HGB UR QL STRIP.AUTO: ABNORMAL
HYALINE CASTS UR QL AUTO: ABNORMAL /LPF
IMM GRANULOCYTES # BLD AUTO: 0.03 10*3/MM3 (ref 0–0.05)
IMM GRANULOCYTES NFR BLD AUTO: 0.3 % (ref 0–0.5)
KETONES UR QL STRIP: NEGATIVE
LEUKOCYTE ESTERASE UR QL STRIP.AUTO: ABNORMAL
LYMPHOCYTES # BLD AUTO: 1.34 10*3/MM3 (ref 0.7–3.1)
LYMPHOCYTES NFR BLD AUTO: 11.8 % (ref 19.6–45.3)
MCH RBC QN AUTO: 29.3 PG (ref 26.6–33)
MCHC RBC AUTO-ENTMCNC: 34.7 G/DL (ref 31.5–35.7)
MCV RBC AUTO: 84.4 FL (ref 79–97)
MONOCYTES # BLD AUTO: 0.84 10*3/MM3 (ref 0.1–0.9)
MONOCYTES NFR BLD AUTO: 7.4 % (ref 5–12)
NEUTROPHILS NFR BLD AUTO: 78.2 % (ref 42.7–76)
NEUTROPHILS NFR BLD AUTO: 8.9 10*3/MM3 (ref 1.7–7)
NITRITE UR QL STRIP: NEGATIVE
NRBC BLD AUTO-RTO: 0 /100 WBC (ref 0–0.2)
PH UR STRIP.AUTO: 5.5 [PH] (ref 5–8)
PLATELET # BLD AUTO: 342 10*3/MM3 (ref 140–450)
PMV BLD AUTO: 8.4 FL (ref 6–12)
POTASSIUM SERPL-SCNC: 4.1 MMOL/L (ref 3.5–5.2)
PROT SERPL-MCNC: 8.8 G/DL (ref 6–8.5)
PROT UR QL STRIP: NEGATIVE
RBC # BLD AUTO: 5.46 10*6/MM3 (ref 4.14–5.8)
RBC # UR: ABNORMAL /HPF
REF LAB TEST METHOD: ABNORMAL
SODIUM SERPL-SCNC: 139 MMOL/L (ref 136–145)
SP GR UR STRIP: 1.02 (ref 1–1.03)
SQUAMOUS #/AREA URNS HPF: ABNORMAL /HPF
UROBILINOGEN UR QL STRIP: ABNORMAL
WBC # BLD AUTO: 11.38 10*3/MM3 (ref 3.4–10.8)
WBC UR QL AUTO: ABNORMAL /HPF

## 2021-07-02 PROCEDURE — 96375 TX/PRO/DX INJ NEW DRUG ADDON: CPT

## 2021-07-02 PROCEDURE — 96374 THER/PROPH/DIAG INJ IV PUSH: CPT

## 2021-07-02 PROCEDURE — 74176 CT ABD & PELVIS W/O CONTRAST: CPT | Performed by: RADIOLOGY

## 2021-07-02 PROCEDURE — 74176 CT ABD & PELVIS W/O CONTRAST: CPT

## 2021-07-02 PROCEDURE — 99283 EMERGENCY DEPT VISIT LOW MDM: CPT

## 2021-07-02 PROCEDURE — 87086 URINE CULTURE/COLONY COUNT: CPT | Performed by: EMERGENCY MEDICINE

## 2021-07-02 PROCEDURE — 25010000002 DROPERIDOL PER 5 MG: Performed by: EMERGENCY MEDICINE

## 2021-07-02 PROCEDURE — 85025 COMPLETE CBC W/AUTO DIFF WBC: CPT | Performed by: EMERGENCY MEDICINE

## 2021-07-02 PROCEDURE — 80053 COMPREHEN METABOLIC PANEL: CPT | Performed by: EMERGENCY MEDICINE

## 2021-07-02 PROCEDURE — 86140 C-REACTIVE PROTEIN: CPT | Performed by: EMERGENCY MEDICINE

## 2021-07-02 PROCEDURE — 81001 URINALYSIS AUTO W/SCOPE: CPT | Performed by: EMERGENCY MEDICINE

## 2021-07-02 PROCEDURE — 25010000002 KETOROLAC TROMETHAMINE PER 15 MG: Performed by: EMERGENCY MEDICINE

## 2021-07-02 PROCEDURE — 85652 RBC SED RATE AUTOMATED: CPT | Performed by: EMERGENCY MEDICINE

## 2021-07-02 RX ORDER — KETOROLAC TROMETHAMINE 30 MG/ML
30 INJECTION, SOLUTION INTRAMUSCULAR; INTRAVENOUS ONCE
Status: COMPLETED | OUTPATIENT
Start: 2021-07-02 | End: 2021-07-02

## 2021-07-02 RX ORDER — CIPROFLOXACIN 500 MG/1
500 TABLET, FILM COATED ORAL 2 TIMES DAILY
Qty: 20 TABLET | Refills: 0 | Status: SHIPPED | OUTPATIENT
Start: 2021-07-02 | End: 2021-07-26

## 2021-07-02 RX ORDER — HYDROCODONE BITARTRATE AND ACETAMINOPHEN 5; 325 MG/1; MG/1
1 TABLET ORAL EVERY 6 HOURS PRN
Qty: 10 TABLET | Refills: 0 | Status: SHIPPED | OUTPATIENT
Start: 2021-07-02 | End: 2021-08-10

## 2021-07-02 RX ORDER — SODIUM CHLORIDE 0.9 % (FLUSH) 0.9 %
10 SYRINGE (ML) INJECTION AS NEEDED
Status: DISCONTINUED | OUTPATIENT
Start: 2021-07-02 | End: 2021-07-03 | Stop reason: HOSPADM

## 2021-07-02 RX ORDER — DROPERIDOL 2.5 MG/ML
1.25 INJECTION, SOLUTION INTRAMUSCULAR; INTRAVENOUS ONCE
Status: COMPLETED | OUTPATIENT
Start: 2021-07-02 | End: 2021-07-02

## 2021-07-02 RX ADMIN — KETOROLAC TROMETHAMINE 30 MG: 30 INJECTION, SOLUTION INTRAMUSCULAR; INTRAVENOUS at 20:38

## 2021-07-02 RX ADMIN — DROPERIDOL 1.25 MG: 2.5 INJECTION, SOLUTION INTRAMUSCULAR; INTRAVENOUS at 20:39

## 2021-07-02 RX ADMIN — SODIUM CHLORIDE 1000 ML: 9 INJECTION, SOLUTION INTRAVENOUS at 20:39

## 2021-07-03 LAB — BACTERIA SPEC AEROBE CULT: NO GROWTH

## 2021-07-10 NOTE — ED PROVIDER NOTES
Subjective     History provided by:  Patient   used: No    Flank Pain  Pain location:  R flank  Pain quality: aching, cramping and sharp    Pain radiates to:  Does not radiate  Pain severity:  Moderate (5/10 on the pain severity scale.)  Onset quality:  Sudden  Timing:  Constant  Progression:  Worsening  Chronicity:  New  Context: not alcohol use, not awakening from sleep, not diet changes, not eating, not laxative use, not medication withdrawal, not previous surgeries, not recent illness, not recent sexual activity, not recent travel, not retching, not sick contacts, not suspicious food intake and not trauma    Relieved by:  Nothing  Worsened by:  Nothing  Ineffective treatments:  None tried  Associated symptoms: dysuria    Associated symptoms: no anorexia, no belching, no chest pain, no chills, no constipation, no cough, no diarrhea, no fatigue, no fever, no flatus, no hematemesis, no hematochezia, no hematuria, no melena, no nausea, no shortness of breath, no sore throat and no vomiting    Risk factors: no alcohol abuse, no aspirin use, not elderly, has not had multiple surgeries, no NSAID use, not obese and no recent hospitalization        Review of Systems   Constitutional: Negative for activity change, appetite change, chills, diaphoresis, fatigue and fever.   HENT: Negative for congestion, ear pain and sore throat.    Eyes: Negative for redness.   Respiratory: Negative for cough, chest tightness, shortness of breath and wheezing.    Cardiovascular: Negative for chest pain, palpitations and leg swelling.   Gastrointestinal: Positive for abdominal pain. Negative for anorexia, constipation, diarrhea, flatus, hematemesis, hematochezia, melena, nausea and vomiting.   Genitourinary: Positive for dysuria and flank pain. Negative for hematuria and urgency.   Musculoskeletal: Negative for arthralgias, back pain, myalgias and neck pain.   Skin: Negative for pallor, rash and wound.   Neurological:  Negative for dizziness, speech difficulty, weakness and headaches.   Psychiatric/Behavioral: Negative for agitation, behavioral problems, confusion and decreased concentration.   All other systems reviewed and are negative.      Past Medical History:   Diagnosis Date   • Anxiety    • Bipolar 1 disorder (CMS/HCC)    • Depression    • Head injury    • HL (hearing loss)    • Hyperlipidemia    • Hypertension    • Kidney stone 17   • Migraine    • Obesity    • Psychiatric illness    • Type 2 diabetes mellitus (CMS/HCC)        Allergies   Allergen Reactions   • Statins Other (See Comments)     All over pain,    • Zofran [Ondansetron] Nausea And Vomiting   • Zoloft [Sertraline Hcl] Nausea And Vomiting       Past Surgical History:   Procedure Laterality Date   • KNEE SURGERY      right   • NASAL POLYP EXCISION     • URETHRAL DILATATION      infant       Family History   Problem Relation Age of Onset   • Hypertension Mother    • Hypertension Father    • Diabetes Maternal Grandfather    • Heart disease Maternal Grandfather    • Mental illness Maternal Grandfather         PTSD   • Liver cancer Paternal Grandfather    • Asthma Paternal Grandfather          in .   • Diabetes Paternal Grandfather        Social History     Socioeconomic History   • Marital status: Single     Spouse name: Not on file   • Number of children: Not on file   • Years of education: Not on file   • Highest education level: Not on file   Tobacco Use   • Smoking status: Former Smoker     Packs/day: 2.00     Years: 15.00     Pack years: 30.00     Start date: 1993     Quit date: 3/6/2009     Years since quittin.3   • Smokeless tobacco: Never Used   Substance and Sexual Activity   • Alcohol use: Yes     Alcohol/week: 1.0 standard drinks     Types: 1 Cans of beer per week     Comment: Very seldom.   • Drug use: No   • Sexual activity: Not Currently     Partners: Female     Birth control/protection: Condom           Objective    Physical Exam  Vitals and nursing note reviewed.   Constitutional:       General: He is not in acute distress.     Appearance: Normal appearance. He is well-developed. He is not toxic-appearing or diaphoretic.   HENT:      Head: Normocephalic and atraumatic.      Right Ear: External ear normal.      Left Ear: External ear normal.      Nose: Nose normal.      Mouth/Throat:      Pharynx: No oropharyngeal exudate.      Tonsils: No tonsillar exudate.   Eyes:      General: Lids are normal.      Conjunctiva/sclera: Conjunctivae normal.      Pupils: Pupils are equal, round, and reactive to light.   Neck:      Thyroid: No thyromegaly.   Cardiovascular:      Rate and Rhythm: Normal rate and regular rhythm.      Pulses: Normal pulses.      Heart sounds: Normal heart sounds, S1 normal and S2 normal.   Pulmonary:      Effort: Pulmonary effort is normal. No tachypnea or respiratory distress.      Breath sounds: Normal breath sounds. No decreased breath sounds, wheezing or rales.   Chest:      Chest wall: No tenderness.   Abdominal:      General: Bowel sounds are normal. There is no distension.      Palpations: Abdomen is soft.      Tenderness: There is abdominal tenderness. There is right CVA tenderness. There is no guarding or rebound.   Musculoskeletal:         General: No tenderness or deformity. Normal range of motion.      Cervical back: Full passive range of motion without pain, normal range of motion and neck supple.   Lymphadenopathy:      Cervical: No cervical adenopathy.   Skin:     General: Skin is warm and dry.      Coloration: Skin is not pale.      Findings: No erythema or rash.   Neurological:      Mental Status: He is alert and oriented to person, place, and time.      GCS: GCS eye subscore is 4. GCS verbal subscore is 5. GCS motor subscore is 6.      Cranial Nerves: No cranial nerve deficit.      Sensory: No sensory deficit.   Psychiatric:         Speech: Speech normal.         Behavior: Behavior normal.          Thought Content: Thought content normal.         Judgment: Judgment normal.         Procedures           ED Course  ED Course as of Jul 10 0501   Fri Jul 02, 2021   2144 IMPRESSION:  1.  Moderate bilateral hydronephrosis. Moderate urinary bladder  distention.  2.  Large calcification noted within the inferior aspect of the prostate  measuring about 8 mm that could represent obstructive urinary calculus.   CT Abdomen Pelvis Without Contrast [ES]      ED Course User Index  [ES] Martir Wong MD                                           MDM  Number of Diagnoses or Management Options  Prostate calculus: new and requires workup     Amount and/or Complexity of Data Reviewed  Clinical lab tests: reviewed and ordered  Tests in the radiology section of CPT®: reviewed and ordered  Tests in the medicine section of CPT®: ordered and reviewed  Review and summarize past medical records: yes  Independent visualization of images, tracings, or specimens: yes    Risk of Complications, Morbidity, and/or Mortality  Presenting problems: moderate  Diagnostic procedures: moderate  Management options: moderate    Patient Progress  Patient progress: stable      Final diagnoses:   Prostate calculus       ED Disposition  ED Disposition     ED Disposition Condition Comment    Discharge Stable           Kaycee Aleman, APRN  [address] [place] [address] [phone]    Schedule an appointment as soon as possible for a visit in 1 day  Evaluate         Medication List      New Prescriptions    ciprofloxacin 500 MG tablet  Commonly known as: CIPRO  Take 1 tablet by mouth 2 (Two) Times a Day.     HYDROcodone-acetaminophen 5-325 MG per tablet  Commonly known as: NORCO  Take 1 tablet by mouth Every 6 (Six) Hours As Needed for Severe Pain .           Where to Get Your Medications      These medications were sent to [facility][id number] - [address] [facility] [facility] [address] [facility] [address] - [phone]  -  [phone] FX  [address] [address]    Phone: [phone]   · ciprofloxacin 500 MG tablet  · HYDROcodone-acetaminophen 5-325 MG per tablet          Martir Wong MD  07/10/21 0143

## 2021-07-12 ENCOUNTER — OFFICE VISIT (OUTPATIENT)
Dept: PSYCHIATRY | Facility: CLINIC | Age: 46
End: 2021-07-12

## 2021-07-12 VITALS
HEIGHT: 70 IN | DIASTOLIC BLOOD PRESSURE: 80 MMHG | HEART RATE: 84 BPM | SYSTOLIC BLOOD PRESSURE: 131 MMHG | WEIGHT: 269.5 LBS | BODY MASS INDEX: 38.58 KG/M2

## 2021-07-12 DIAGNOSIS — F31.81 BIPOLAR II DISORDER (HCC): Primary | ICD-10-CM

## 2021-07-12 DIAGNOSIS — F51.05 INSOMNIA DUE TO MENTAL DISORDER: ICD-10-CM

## 2021-07-12 DIAGNOSIS — Z79.899 MEDICATION MANAGEMENT: ICD-10-CM

## 2021-07-12 PROCEDURE — 90792 PSYCH DIAG EVAL W/MED SRVCS: CPT | Performed by: NURSE PRACTITIONER

## 2021-07-12 RX ORDER — TRAZODONE HYDROCHLORIDE 50 MG/1
25-50 TABLET ORAL NIGHTLY
Qty: 30 TABLET | Refills: 1 | Status: SHIPPED | OUTPATIENT
Start: 2021-07-12 | End: 2021-08-12

## 2021-07-12 RX ORDER — ZIPRASIDONE HYDROCHLORIDE 60 MG/1
60 CAPSULE ORAL 2 TIMES DAILY WITH MEALS
Qty: 60 CAPSULE | Refills: 1 | Status: SHIPPED | OUTPATIENT
Start: 2021-07-12 | End: 2021-08-12 | Stop reason: SDUPTHER

## 2021-07-12 RX ORDER — HYDROXYZINE HYDROCHLORIDE 10 MG/1
10-20 TABLET, FILM COATED ORAL 3 TIMES DAILY PRN
Qty: 60 TABLET | Refills: 0 | Status: SHIPPED | OUTPATIENT
Start: 2021-07-12 | End: 2021-08-12 | Stop reason: SDUPTHER

## 2021-07-12 NOTE — PROGRESS NOTES
Subjective   Thomas Mireles is a 45 y.o. male who is here today for initial appointment to discuss  medication options to treat Bipolar Disorder. He was referred by his PCP, LAUREN Majano. This is his initial encounter with this provider. Details from EMR incorporated into note.      Chief Complaint: chronic depression, insomnia    HPI: Patient reports he was diagnosed with Bipolar II disorder at age 27 and has taken numerous medications over the years. Current psychotropic medications include Vistaril 25-50 mg at night as needed,  Geodon 60 bid started while inpatient at [facility] 2/14/2021 and managed by Kaycee Aleman. He reports the event that triggered the his psychiatric referral was his conversation with his PCP discussing his intentional self harm by not treating his diabetes for the last 7 years. Depression increased in 2016 after sustaining head injury when a wet ceiling tile fell on his head. He suffers from  Migraine headaches since the injury which  makes it difficult for him to work.  His PHQ score today is  17. He identifies the following criteria of MDD occurring over the last 2 weeks: anhedonia, feeling depressed, insomnia, fatigue, anorexia, poor self esteem, trouble concentration, SI. Denies active plan or intent for suicide. Denies HI/AVH.  MDQ score 13 . Confirms the following criteria for mood disorder: hyperactivity, irritability, inflated self-esteem,decrease need for sleep, rapid speech, racing thoughts, distractibility,  more socially engaging, hypersexual, risky behavior, excessive spending. Most of these symptoms occur simultaneously and have caused serious problems in his family. Patient reports his longest consecutive period without sleep is three days. He describes going on a spending spree and borrowing $15,000 from his 401k. He was unable to repay the money. The loan defaulted and he had to pay taxes on it. Reports chronic insomnia. He is awake for 2 nights if he does not take Vistaril, but  "feels oversedated in the morning if he does.  Appetite is decreased. States he forces himself to eat. Reports losing 100 lbs in undefined span of time.     The following portions of the patient's history were reviewed and updated as appropriate: allergies, current medications, past family history, past medical history, past social history, past surgical history and problem list.    Family History:  Family History   Problem Relation Age of Onset   • Hypertension Mother    • Hypertension Father    • Diabetes Maternal Grandfather    • Heart disease Maternal Grandfather    • Mental illness Maternal Grandfather         PTSD   • Liver cancer Paternal Grandfather    • Asthma Paternal Grandfather          in .   • Diabetes Paternal Grandfather      Social History: Born in [place], [place]. One sister. Lives in [place] with his parents. Never ; no children. Associate's degree from [employer]. 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 Wet ceiling tile fell and hit him on top of head 2016    Previous Psych History:  Diagnoses: Bipolar II DO age 27    OP: Therapy in [place] for depression 1 hr every week x several months (date/provider unknown);  [facility]/telehealth for psychotherapy and med management after [facility] discharge .     IP: 2017 to 11/3/2017  [facility] SI reported after Haldol. Seroquel started while inpt-reported as effective in note  2021 hospitalized [facility]/Dr. JON Vargas with dx of SI after verbalizing plans to OD. Defined as his  \"Come to [place] Moment.\"     Previous Psych Meds: Lexapro, Paxil, Zoloft, Seroquel (zombie), vistaril, Geodon, Lithium (GI upset, hand tremor) , Lamictal (photosensitivity), Topamax (ineffective), Nuedexta. Unknown if adequate med trials.    Self Harm- medical \"attrition\";  medical noncompliance x 7 years. Patient " reports he thought it was a clever plan.  stockpiled meds.    Substance Abuse: smoked cigarettes 2 PPD from 2003 to 2009; drank fifth of liquor daily x 2 years. No heavy alcohol use x 2 years    Legal History: none    Past Surgical History:  Past Surgical History:   Procedure Laterality Date   • KNEE SURGERY  1992    right   • NASAL POLYP EXCISION  2009   • URETHRAL DILATATION      infant     Problem List:  Patient Active Problem List   Diagnosis   • Hypertension   • Hyperlipidemia   • Type 2 diabetes mellitus with hyperglycemia (CMS/[facility])   • Bipolar II disorder (CMS/[facility])   • Rosacea   • Abnormal EKG   • Old myocardial infarction by EKG criteria.   • Dyspnea on exertion (class 2-3)   • Head injury     Allergy:  Allergies   Allergen Reactions   • Statins Other (See Comments)     All over pain,    • Zofran [Ondansetron] Nausea And Vomiting   • Zoloft [Sertraline Hcl] Nausea And Vomiting     Current Medications:   Current Outpatient Medications   Medication Sig Dispense Refill   • aspirin 81 MG EC tablet Take 1 tablet by mouth Daily. 90 tablet 1   • butalbital-aspirin-caffeine (FIORINAL) -40 MG per capsule Take 1 capsule by mouth 2 (Two) Times a Day As Needed for Headache. 40 capsule 0   • ciprofloxacin (CIPRO) 500 MG tablet Take 1 tablet by mouth 2 (Two) Times a Day. 20 tablet 0   • cyclobenzaprine (FLEXERIL) 5 MG tablet Take 1 tablet by mouth At Night As Needed for Muscle Spasms. 90 tablet 1   • Dulaglutide (Trulicity) 1.5 MG/0.5ML solution pen-injector Inject 1.5 mg under the skin into the appropriate area as directed 1 (One) Time Per Week. 9 mL 1   • fenofibrate (TRICOR) 145 MG tablet Take 1 tablet by mouth Daily. 90 tablet 1   • glucose blood test strip For daily testing  E11.65 100 each 11   • glucose blood test strip Use as instructed to check sugar once daily 100 each 12   • glucose monitor monitoring kit 1 each As Needed (use to check sugar once daily). 1 each 0   • hydrOXYzine pamoate (Vistaril) 25 MG  capsule Take 2 capsules by mouth At Night As Needed for Itching. 90 capsule 1   • Lancets misc 1 each Daily. 100 each 1   • lisinopril (PRINIVIL,ZESTRIL) 5 MG tablet Take 1 tablet by mouth Daily. 90 tablet 1   • magnesium citrate solution Take 296 mL by mouth Take As Directed. Follow bowel prep instructions given at office 592 mL 0   • metoprolol tartrate (LOPRESSOR) 25 MG tablet Take 1 tablet by mouth Every 12 (Twelve) Hours. 180 tablet 1   • metroNIDAZOLE (METROGEL) 0.75 % gel Apply  topically to the appropriate area as directed 2 (Two) Times a Day. 45 g 5   • naproxen (Naprosyn) 500 MG tablet Take 1 tablet by mouth 2 (Two) Times a Day With Meals. 180 tablet 1   • Omega-3 Fatty Acids (fish oil) 1000 MG capsule capsule Take 1 capsule by mouth 2 (Two) Times a Day With Meals. 180 capsule 1   • pantoprazole (PROTONIX) 40 MG EC tablet Take 1 tablet by mouth Daily. 90 tablet 1   • polyethylene glycol (MIRALAX) 17 GM/SCOOP powder Take 17 g by mouth Daily. Mix with 8 oz liquid 510 g 5   • vitamin D (ERGOCALCIFEROL) 1.25 MG (71641 UT) capsule capsule Take 1 capsule by mouth Every 7 (Seven) Days. 5 capsule 2   • ziprasidone (GEODON) 60 MG capsule Take 1 capsule by mouth twice a day 180 capsule 1   • HYDROcodone-acetaminophen (NORCO) 5-325 MG per tablet Take 1 tablet by mouth Every 6 (Six) Hours As Needed for Severe Pain . 10 tablet 0     No current facility-administered medications for this visit.     Review of Systems  Review of Systems   Constitutional: Positive for activity change, appetite change and fatigue.   Musculoskeletal: Positive for arthralgias.   Neurological: Positive for light-headedness, numbness and headache.   Psychiatric/Behavioral: Positive for agitation, decreased concentration, sleep disturbance, depressed mood and stress. Negative for hallucinations, self-injury and suicidal ideas. The patient is nervous/anxious.    All other systems reviewed and are negative.    Objective   Physical Exam  Vitals  "reviewed.   Neurological:      Mental Status: He is alert and oriented to person, place, and time.      Gait: Gait is intact.   Psychiatric:         Attention and Perception: Attention and perception normal.         Mood and Affect: Affect is blunt.         Speech: Speech normal.         Behavior: Behavior normal. Behavior is cooperative.         Thought Content: Thought content normal. Thought content is not paranoid or delusional. Thought content does not include homicidal or suicidal ideation.         Cognition and Memory: Cognition is impaired. Memory is impaired.         Judgment: Judgment is impulsive.       Blood pressure 131/80, pulse 84, height 177.8 cm (70\"), weight 122 kg (269 lb 8 oz).    Appearance: Thomas is a 45 year old  male. He appears clean, appropriately dressed. BMI 38.67    Gait, Station, Strength: Posture upright, gait steady. No obvious signs of impairment or acute distress. No abnormal muscle movements, motor tics or tremors observed.     Mental Status Exam:   Hygiene:   good  Cooperation:  Cooperative  Eye Contact:  Good  Psychomotor Behavior:  Appropriate  Affect:  Appropriate  Hopelessness: Denies  Optimistic:minimally  Speech:  Normal  Thought Process:  Goal directed and Linear  Thought Content:  Normal  Suicidal:  Suicidal Ideation and chronic passive, no active plan  Homicidal:  None  Hallucinations:  None  Delusion:  None  Memory:  Deficits  Orientation:  Person, Place, Time and Situation  Reliability:  fair  Insight:  Fair  Judgement:  Impaired  Impulse Control:  Impaired  Physical/Medical Issues:  DMII, HTN, Head injury, migraine,     PHQ-Score Total:  PHQ-9 Total Score: 17    Patient screened positive for depression based on a PHQ-9 score of  on . Follow-up recommendations include: Prescribed antidepressant medication treatment.    Lab Results:   Admission on 07/02/2021, Discharged on 07/02/2021   Component Date Value Ref Range Status   • Glucose 07/02/2021 154* 65 - 99 " mg/dL Final   • BUN 07/02/2021 19  6 - 20 mg/dL Final   • Creatinine 07/02/2021 1.31* 0.76 - 1.27 mg/dL Final   • Sodium 07/02/2021 139  136 - 145 mmol/L Final   • Potassium 07/02/2021 4.1  3.5 - 5.2 mmol/L Final   • Chloride 07/02/2021 100  98 - 107 mmol/L Final   • CO2 07/02/2021 26.4  22.0 - 29.0 mmol/L Final   • Calcium 07/02/2021 11.3* 8.6 - 10.5 mg/dL Final   • Total Protein 07/02/2021 8.8* 6.0 - 8.5 g/dL Final   • Albumin 07/02/2021 4.89  3.50 - 5.20 g/dL Final   • ALT (SGPT) 07/02/2021 20  1 - 41 U/L Final   • AST (SGOT) 07/02/2021 23  1 - 40 U/L Final   • Alkaline Phosphatase 07/02/2021 65  39 - 117 U/L Final   • Total Bilirubin 07/02/2021 0.5  0.0 - 1.2 mg/dL Final   • eGFR Non African Amer 07/02/2021 59* >60 mL/min/1.73 Final   • Globulin 07/02/2021 3.9  gm/dL Final   • A/G Ratio 07/02/2021 1.3  g/dL Final   • BUN/Creatinine Ratio 07/02/2021 14.5  7.0 - 25.0 Final   • Anion Gap 07/02/2021 12.6  5.0 - 15.0 mmol/L Final   • Color, UA 07/02/2021 Orange* Yellow, Straw Final    Dipstick results may be inaccurate due to color interference.    • Appearance, UA 07/02/2021 Clear  Clear Final   • pH, UA 07/02/2021 5.5  5.0 - 8.0 Final   • Specific Gravity, UA 07/02/2021 1.017  1.005 - 1.030 Final   • Glucose, UA 07/02/2021 500 mg/dL (2+)* Negative Final   • Ketones, UA 07/02/2021 Negative  Negative Final   • Bilirubin, UA 07/02/2021 Negative  Negative Final   • Blood, UA 07/02/2021 Large (3+)* Negative Final   • Protein, UA 07/02/2021 Negative  Negative Final   • Leuk Esterase, UA 07/02/2021 Small (1+)* Negative Final   • Nitrite, UA 07/02/2021 Negative  Negative Final   • Urobilinogen, UA 07/02/2021 1.0 E.U./dL  0.2 - 1.0 E.U./dL Final   • Sed Rate 07/02/2021 28* 0 - 15 mm/hr Final   • C-Reactive Protein 07/02/2021 <0.30  0.00 - 0.50 mg/dL Final   • WBC 07/02/2021 11.38* 3.40 - 10.80 10*3/mm3 Final   • RBC 07/02/2021 5.46  4.14 - 5.80 10*6/mm3 Final   • Hemoglobin 07/02/2021 16.0  13.0 - 17.7 g/dL Final   •  Hematocrit 07/02/2021 46.1  37.5 - 51.0 % Final   • MCV 07/02/2021 84.4  79.0 - 97.0 fL Final   • MCH 07/02/2021 29.3  26.6 - 33.0 pg Final   • MCHC 07/02/2021 34.7  31.5 - 35.7 g/dL Final   • RDW 07/02/2021 12.6  12.3 - 15.4 % Final   • RDW-SD 07/02/2021 38.1  37.0 - 54.0 fl Final   • MPV 07/02/2021 8.4  6.0 - 12.0 fL Final   • Platelets 07/02/2021 342  140 - 450 10*3/mm3 Final   • Neutrophil % 07/02/2021 78.2* 42.7 - 76.0 % Final   • Lymphocyte % 07/02/2021 11.8* 19.6 - 45.3 % Final   • Monocyte % 07/02/2021 7.4  5.0 - 12.0 % Final   • Eosinophil % 07/02/2021 1.8  0.3 - 6.2 % Final   • Basophil % 07/02/2021 0.5  0.0 - 1.5 % Final   • Immature Grans % 07/02/2021 0.3  0.0 - 0.5 % Final   • Neutrophils, Absolute 07/02/2021 8.90* 1.70 - 7.00 10*3/mm3 Final   • Lymphocytes, Absolute 07/02/2021 1.34  0.70 - 3.10 10*3/mm3 Final   • Monocytes, Absolute 07/02/2021 0.84  0.10 - 0.90 10*3/mm3 Final   • Eosinophils, Absolute 07/02/2021 0.21  0.00 - 0.40 10*3/mm3 Final   • Basophils, Absolute 07/02/2021 0.06  0.00 - 0.20 10*3/mm3 Final   • Immature Grans, Absolute 07/02/2021 0.03  0.00 - 0.05 10*3/mm3 Final   • nRBC 07/02/2021 0.0  0.0 - 0.2 /100 WBC Final   • RBC, UA 07/02/2021 Too Numerous to Count* None Seen, 0-2 /HPF Final   • WBC, UA 07/02/2021 6-12* None Seen, 0-2 /HPF Final   • Bacteria, UA 07/02/2021 None Seen  None Seen /HPF Final   • Squamous Epithelial Cells, UA 07/02/2021 None Seen  None Seen, 0-2 /HPF Final   • Hyaline Casts, UA 07/02/2021 None Seen  None Seen /LPF Final   • Methodology 07/02/2021 Automated Microscopy   Final   • Urine Culture 07/02/2021 No growth   Final       Assessment/Plan   Diagnoses and all orders for this visit:    1. Bipolar II disorder (CMS/HCC) (Primary)  -     traZODone (DESYREL) 50 MG tablet; Take 0.5-1 tablets by mouth Every Night.  Dispense: 30 tablet; Refill: 1  -     ziprasidone (Geodon) 60 MG capsule; Take 1 capsule by mouth 2 (Two) Times a Day With Meals.  Dispense: 60 capsule;  Refill: 1    2. Insomnia due to mental disorder  -     traZODone (DESYREL) 50 MG tablet; Take 0.5-1 tablets by mouth Every Night.  Dispense: 30 tablet; Refill: 1  -     hydrOXYzine (ATARAX) 10 MG tablet; Take 1-2 tablets by mouth 3 (Three) Times a Day As Needed (insomnia).  Dispense: 60 tablet; Refill: 0    3. Medication management    Patient takes Geodon and Vistaril as prescribed. He finds Geodon somewhat effective for Bipolar symptoms. Vistaril makes him feel oversedated. Potential benefits, risks sided effects of changing formulation of hydroxyzine (for dosing flexibility) and adding Trazodone discussed. Patient is agreeable.      -Continue Geodon 60 mg twice daily with 500 calorie meal for Bipolar disorder symptoms    -Discontinue Hydroxyzine pamoate- oversedating    -Start Hydroxyzine HCL 10 mg tab 10-20 mg three times daily as needed for anxiety, insomnia     -Start Trazodone 50 mg - half to one whole tablet at night for insomnia, depression     -Discussed utility in monitoring labs for baseline comparison. Importance of correcting vitamin and metabolic alterations to optimize psychiatric treatment. Labs will need to be monitored regularly while taking SGA.     - Will need EKG to monitor for Qtc prolongation    -The benefits of a healthy diet and exercise were discussed with patient, especially the positive effects they have on mental health. Patient encouraged to consider lifestyle modification regarding  diet and exercise patterns to maximize results of mental health treatment.    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 Normal metabolizer of other substrates    -Thomas Mireles  reports that he quit smoking about 12 years ago. He started smoking about 28 years ago. He has a 30.00 pack-year smoking history. He has never used smokeless tobacco.         Visit Diagnoses:    ICD-10-CM ICD-9-CM   1.  Bipolar II disorder (CMS/[facility])  F31.81 296.89   2. Insomnia due to mental disorder  F51.05 300.9     327.02   3. Medication management  Z79.899 V58.69     TREATMENT PLAN/GOALS:  Treatment and medication options discussed during today's visit. Patient acknowledged and verbally consents to proceed with mutually agreed upon treatment plan. Patient reminded of important role medication adherence and regular follow-up appointments play in symptom alleviation and long term prognosis. Encouraged to Pursue  supportive psychotherapy and take medications as prescribed.    MEDICATION ISSUES:  Discussed medication treatment options and plan of prescribed medication. Potential risks, benefits, and side effects including but not limited to the following reviewed: Black Box warnings, worsening symptoms, SI, sedation, GI side effects, metabolic alterations and blood pressure fluctuations. Patient is reminded to refrain from illicit substance use, including alcohol while taking medications. Also advised to refrain from activity requiring  alertness until sedative effects of medication are assessed. Patient  agrees to call [facility] with any worsening of symptoms or onset of intolerable side effects. Patient is agreeable to call 911 or go to the nearest ER should he  begin having SI/HI.     PROGNOSIS  .Patient's diagnosis include Bipolar II Disorder and Insomnia. Unique factors influencing symptom alleviation/remission include: pre-existing conditions, symptom chronicity, symptom severity, degree of impairment, social support, financial security, motivation, patient engagement and medication adherence. His prognosis  is unknown at this time    Prognosis: Guarded- dependent on patient's adherence to medication treatment plan and follow up appointments  Functionality: Patient not yet showing improvements in important areas of daily functioning.     Short-term goals: Patient will adhere to medication regimen and experience continued  improvement in symptoms over the next 3 months.     Long-term goals: Patient will adhere to medication treatment plan and report improvement in symptoms over the next 6 months    MEDS ORDERED DURING VISIT:  New Medications Ordered This Visit   Medications   • traZODone (DESYREL) 50 MG tablet     Sig: Take 0.5-1 tablets by mouth Every Night.     Dispense:  30 tablet     Refill:  1   • ziprasidone (Geodon) 60 MG capsule     Sig: Take 1 capsule by mouth 2 (Two) Times a Day With Meals.     Dispense:  60 capsule     Refill:  1   • hydrOXYzine (ATARAX) 10 MG tablet     Sig: Take 1-2 tablets by mouth 3 (Three) Times a Day As Needed (insomnia).     Dispense:  60 tablet     Refill:  0     Return in about 4 weeks (around 8/9/2021).          This document has been electronically signed by LAUREN Oswald   July 12, 2021 15:48 EDT    Part of this note may be an electronic transcription/translation of spoken language to printed text using the Dragon Dictation System.

## 2021-07-20 PROBLEM — K59.04 CHRONIC IDIOPATHIC CONSTIPATION: Status: ACTIVE | Noted: 2021-07-20

## 2021-07-27 ENCOUNTER — ANESTHESIA EVENT (OUTPATIENT)
Dept: PERIOP | Facility: HOSPITAL | Age: 46
End: 2021-07-27

## 2021-07-27 ENCOUNTER — HOSPITAL ENCOUNTER (OUTPATIENT)
Facility: HOSPITAL | Age: 46
Setting detail: HOSPITAL OUTPATIENT SURGERY
Discharge: HOME | End: 2021-07-27
Attending: INTERNAL MEDICINE | Admitting: INTERNAL MEDICINE

## 2021-07-27 ENCOUNTER — ANESTHESIA (OUTPATIENT)
Dept: PERIOP | Facility: HOSPITAL | Age: 46
End: 2021-07-27

## 2021-07-27 ENCOUNTER — TELEPHONE (OUTPATIENT)
Dept: GASTROENTEROLOGY | Facility: CLINIC | Age: 46
End: 2021-07-27

## 2021-07-27 VITALS
HEIGHT: 70 IN | OXYGEN SATURATION: 97 % | BODY MASS INDEX: 38.65 KG/M2 | HEART RATE: 77 BPM | SYSTOLIC BLOOD PRESSURE: 118 MMHG | TEMPERATURE: 98.8 F | WEIGHT: 270 LBS | DIASTOLIC BLOOD PRESSURE: 79 MMHG | RESPIRATION RATE: 16 BRPM

## 2021-07-27 DIAGNOSIS — Z12.11 ENCOUNTER FOR COLORECTAL CANCER SCREENING: Primary | ICD-10-CM

## 2021-07-27 DIAGNOSIS — Z12.12 ENCOUNTER FOR COLORECTAL CANCER SCREENING: Primary | ICD-10-CM

## 2021-07-27 DIAGNOSIS — K59.04 CHRONIC IDIOPATHIC CONSTIPATION: ICD-10-CM

## 2021-07-27 LAB — GLUCOSE BLDC GLUCOMTR-MCNC: 109 MG/DL (ref 70–130)

## 2021-07-27 PROCEDURE — 45378 DIAGNOSTIC COLONOSCOPY: CPT | Performed by: INTERNAL MEDICINE

## 2021-07-27 PROCEDURE — 82962 GLUCOSE BLOOD TEST: CPT

## 2021-07-27 PROCEDURE — 25010000002 PROPOFOL 10 MG/ML EMULSION: Performed by: NURSE ANESTHETIST, CERTIFIED REGISTERED

## 2021-07-27 PROCEDURE — 25010000002 MIDAZOLAM PER 1 MG: Performed by: NURSE ANESTHETIST, CERTIFIED REGISTERED

## 2021-07-27 RX ORDER — FENTANYL CITRATE 50 UG/ML
50 INJECTION, SOLUTION INTRAMUSCULAR; INTRAVENOUS
Status: DISCONTINUED | OUTPATIENT
Start: 2021-07-27 | End: 2021-07-27 | Stop reason: HOSPADM

## 2021-07-27 RX ORDER — MIDAZOLAM HYDROCHLORIDE 1 MG/ML
1 INJECTION INTRAMUSCULAR; INTRAVENOUS
Status: DISCONTINUED | OUTPATIENT
Start: 2021-07-27 | End: 2021-07-27 | Stop reason: HOSPADM

## 2021-07-27 RX ORDER — ONDANSETRON 2 MG/ML
4 INJECTION INTRAMUSCULAR; INTRAVENOUS AS NEEDED
Status: DISCONTINUED | OUTPATIENT
Start: 2021-07-27 | End: 2021-07-27 | Stop reason: HOSPADM

## 2021-07-27 RX ORDER — BISACODYL 5 MG
TABLET, DELAYED RELEASE (ENTERIC COATED) ORAL
Qty: 4 TABLET | Refills: 0 | Status: SHIPPED | OUTPATIENT
Start: 2021-07-27 | End: 2022-02-22

## 2021-07-27 RX ORDER — SODIUM CHLORIDE, SODIUM LACTATE, POTASSIUM CHLORIDE, CALCIUM CHLORIDE 600; 310; 30; 20 MG/100ML; MG/100ML; MG/100ML; MG/100ML
125 INJECTION, SOLUTION INTRAVENOUS ONCE
Status: COMPLETED | OUTPATIENT
Start: 2021-07-27 | End: 2021-07-27

## 2021-07-27 RX ORDER — IPRATROPIUM BROMIDE AND ALBUTEROL SULFATE 2.5; .5 MG/3ML; MG/3ML
3 SOLUTION RESPIRATORY (INHALATION) ONCE AS NEEDED
Status: DISCONTINUED | OUTPATIENT
Start: 2021-07-27 | End: 2021-07-27 | Stop reason: HOSPADM

## 2021-07-27 RX ORDER — PROPOFOL 10 MG/ML
VIAL (ML) INTRAVENOUS CONTINUOUS PRN
Status: DISCONTINUED | OUTPATIENT
Start: 2021-07-27 | End: 2021-07-27 | Stop reason: SURG

## 2021-07-27 RX ORDER — MEPERIDINE HYDROCHLORIDE 25 MG/ML
12.5 INJECTION INTRAMUSCULAR; INTRAVENOUS; SUBCUTANEOUS
Status: DISCONTINUED | OUTPATIENT
Start: 2021-07-27 | End: 2021-07-27 | Stop reason: HOSPADM

## 2021-07-27 RX ORDER — SODIUM CHLORIDE 0.9 % (FLUSH) 0.9 %
10 SYRINGE (ML) INJECTION EVERY 12 HOURS SCHEDULED
Status: DISCONTINUED | OUTPATIENT
Start: 2021-07-27 | End: 2021-07-27 | Stop reason: HOSPADM

## 2021-07-27 RX ORDER — SODIUM CHLORIDE 0.9 % (FLUSH) 0.9 %
10 SYRINGE (ML) INJECTION AS NEEDED
Status: DISCONTINUED | OUTPATIENT
Start: 2021-07-27 | End: 2021-07-27 | Stop reason: HOSPADM

## 2021-07-27 RX ORDER — MIDAZOLAM HYDROCHLORIDE 1 MG/ML
INJECTION INTRAMUSCULAR; INTRAVENOUS AS NEEDED
Status: DISCONTINUED | OUTPATIENT
Start: 2021-07-27 | End: 2021-07-27 | Stop reason: SURG

## 2021-07-27 RX ADMIN — MIDAZOLAM 2 MG: 1 INJECTION INTRAMUSCULAR; INTRAVENOUS at 09:56

## 2021-07-27 RX ADMIN — PROPOFOL 200 MCG/KG/MIN: 10 INJECTION, EMULSION INTRAVENOUS at 09:56

## 2021-07-27 RX ADMIN — SODIUM CHLORIDE, POTASSIUM CHLORIDE, SODIUM LACTATE AND CALCIUM CHLORIDE: 600; 310; 30; 20 INJECTION, SOLUTION INTRAVENOUS at 09:56

## 2021-07-27 NOTE — ANESTHESIA PREPROCEDURE EVALUATION
Anesthesia Evaluation     Patient summary reviewed and Nursing notes reviewed   no history of anesthetic complications:  NPO Solid Status: > 8 hours  NPO Liquid Status: > 8 hours           Airway   Mallampati: II  TM distance: >3 FB  Neck ROM: full  Comment: Large Beard  Dental - normal exam     Pulmonary - normal exam   Cardiovascular - normal exam    ECG reviewed    (+) hypertension, past MI  >12 months, hyperlipidemia,     ROS comment: Echo 2018  Echocardiogram Findings    Left Ventricle Estimated EF appears to be in the range of 61 - 65%. Normal left ventricular cavity size and wall thickness noted. All left ventricular wall segments contract normally.  Right Ventricle Normal right ventricular cavity size, wall thickness, systolic function and septal motion noted.  Left Atrium Normal left atrial size and volume noted.  Right Atrium Normal right atrial size noted.  Aortic Valve The aortic valve is structurally normal. No aortic valve regurgitation is present. No aortic valve stenosis is present.  Mitral Valve The mitral valve is normal in structure. No mitral valve regurgitation is present. No significant mitral valve stenosis is present.  Tricuspid Valve The tricuspid valve is normal.  No tricuspid valve stenosis is present. No tricuspid valve regurgitation is present.  Pulmonic Valve The pulmonic valve is structurally normal. There is no significant pulmonic valve stenosis present. There is no pulmonic valve regurgitation present.  Greater Vessels No dilation of the aortic root is present.  Pericardium The pericardium is normal. There is no evidence of pericardial effusion.        Neuro/Psych  (+) headaches, psychiatric history Bipolar,     GI/Hepatic/Renal/Endo    (+) obesity, morbid obesity,  renal disease, diabetes mellitus,     Musculoskeletal (-) negative ROS    Abdominal  - normal exam   Substance History - negative use     OB/GYN    (+) pregnancy induced hypertension        Other - negative ROS                      Anesthesia Plan    ASA 3     general     intravenous induction     Anesthetic plan, all risks, benefits, and alternatives have been provided, discussed and informed consent has been obtained with: patient.  Use of blood products discussed with patient  Consented to blood products.

## 2021-07-27 NOTE — OP NOTE
COLONOSCOPY PROCEDURE NOTE    Thomas Mireles  7/27/2021    GASTROENTEROLOGIST:  Judy Grant MD      PRE-PROCEDURE DIAGNOSIS:   Chronic idiopathic constipation [K59.04]    POST-PROCEDURE DIAGNOSIS  1.  Inadequate colon prep    PROCEDURE:  COLONOSCOPY aborted    ANESTHESIA:  Propofol administered by anesthesia.  See anesthesia notes for ASA classification    STAFF  Circulator: Alma Avendano RN  Endo Technician: Leon Cespedes    Findings:  1.  Inadequate colon prep solid and liquid stool within the rectal vault.  I could not clear the rectal vault to proceed.    OPERATIVE PROCEDURE   After proper informed consent was obtained, the patient was taken the operating suite and placed in left lateral decubitus position.  An Olympus video colonoscope 180 series was inserted in the rectum and advanced to the rectum under direct visualization.  The colonoscope was then slowly withdrawn from  the rectum.  Scope was then withdrawn. Patient tolerated the procedure well. There were no immediate complications.  The procedure was aborted.    ESTIMATED BLOOD LOSS  None     COMPLICATIONS  None    RECOMMENDATIONS:  1.  Linzess 145 mcg once daily.  2.  Repeat colonoscopy for screening purposes and 3 to 6 months with a 2-day colon prep.    Judy Grant MD  07/27/21 10:03 EDT

## 2021-07-27 NOTE — TELEPHONE ENCOUNTER
Please call patient with an appointment for a follow up colonoscopy in three months with Dr. Blackwood.  He will need to do a 2 day bowel prep.  The first prep day he will need to start clear liquids and use 4 dulcolax tablets.  On the second prep day he will continue clear liquids, use 4 more dulcolax tablets and complete a two bottle magnesium citrate cleanout. Orders placed.

## 2021-07-27 NOTE — ANESTHESIA POSTPROCEDURE EVALUATION
Patient: Thomas Mireles    Procedure Summary     Date: 07/27/21 Room / Location: [facility] OR 07 /  [facility] OR    Anesthesia Start: 0954 Anesthesia Stop: 1006    Procedure: COLONOSCOPY (N/A ) Diagnosis:       Chronic idiopathic constipation      (Chronic idiopathic constipation [K59.04])    Surgeons: Judy Grant MD Provider: Boogie Hall MD    Anesthesia Type: general ASA Status: 3          Anesthesia Type: general    Vitals  No vitals data found for the desired time range.          Post Anesthesia Care and Evaluation    Patient location during evaluation: PACU  Patient participation: complete - patient participated  Level of consciousness: awake  Pain score: 0  Pain management: adequate  Airway patency: patent  Anesthetic complications: No anesthetic complications  PONV Status: none  Cardiovascular status: acceptable  Respiratory status: acceptable  Hydration status: acceptable

## 2021-08-10 ENCOUNTER — OFFICE VISIT (OUTPATIENT)
Dept: FAMILY MEDICINE CLINIC | Facility: CLINIC | Age: 46
End: 2021-08-10

## 2021-08-10 VITALS
HEIGHT: 70 IN | TEMPERATURE: 97.1 F | WEIGHT: 265.8 LBS | OXYGEN SATURATION: 98 % | SYSTOLIC BLOOD PRESSURE: 110 MMHG | DIASTOLIC BLOOD PRESSURE: 80 MMHG | HEART RATE: 82 BPM | BODY MASS INDEX: 38.05 KG/M2

## 2021-08-10 DIAGNOSIS — R51.9 CHRONIC DAILY HEADACHE: ICD-10-CM

## 2021-08-10 DIAGNOSIS — E66.01 CLASS 2 SEVERE OBESITY DUE TO EXCESS CALORIES WITH SERIOUS COMORBIDITY AND BODY MASS INDEX (BMI) OF 38.0 TO 38.9 IN ADULT (HCC): ICD-10-CM

## 2021-08-10 DIAGNOSIS — I10 ESSENTIAL HYPERTENSION: ICD-10-CM

## 2021-08-10 DIAGNOSIS — E11.65 TYPE 2 DIABETES MELLITUS WITH HYPERGLYCEMIA, WITH LONG-TERM CURRENT USE OF INSULIN (HCC): Primary | ICD-10-CM

## 2021-08-10 DIAGNOSIS — F31.81 BIPOLAR II DISORDER (HCC): ICD-10-CM

## 2021-08-10 DIAGNOSIS — Z79.4 TYPE 2 DIABETES MELLITUS WITH HYPERGLYCEMIA, WITH LONG-TERM CURRENT USE OF INSULIN (HCC): Primary | ICD-10-CM

## 2021-08-10 DIAGNOSIS — M25.50 ARTHRALGIA, UNSPECIFIED JOINT: ICD-10-CM

## 2021-08-10 PROCEDURE — 99214 OFFICE O/P EST MOD 30 MIN: CPT | Performed by: NURSE PRACTITIONER

## 2021-08-10 RX ORDER — HYDROXYZINE PAMOATE 25 MG/1
CAPSULE ORAL
COMMUNITY
Start: 2021-08-03 | End: 2021-08-10

## 2021-08-10 NOTE — PROGRESS NOTES
Subjective   Thomas Mireles is a 45 y.o. male.   Chief Compliant: The patient presents with Hyperlipidemia    Depression  Visit Type: follow-up (Feeling some better with improved energy and activity)  Patient presents with the following symptoms: dizziness, excessive worry, impotence, insomnia (able to rest with vistaril), muscle tension, nervousness/anxiety and suicidal ideas.  Patient is not experiencing: confusion, decreased concentration, depressed mood, malaise, palpitations, shortness of breath, suicidal planning, thoughts of death, weight gain and weight loss.  Frequency of symptoms: most days   Severity: interfering with daily activities   Sleep quality: fair  Nighttime awakenings: occasional  Compliance with medications:  0-25%  Side effects:  Headaches and weight gain  Diabetes  He presents for his follow-up diabetic visit. He has type 2 diabetes mellitus. No MedicAlert identification noted. The initial diagnosis of diabetes was made 9 years ago. His disease course has been improving. Hypoglycemia symptoms include headaches (chronic), mood changes and nervousness/anxiousness. Pertinent negatives for hypoglycemia include no confusion, dizziness, hunger, pallor, seizures, sleepiness, speech difficulty, sweats or tremors. There are no diabetic associated symptoms. Pertinent negatives for diabetes include no blurred vision, no chest pain, no foot paresthesias, no foot ulcerations, no polydipsia, no polyphagia, no polyuria, no visual change, no weakness and no weight loss. There are no hypoglycemic complications. Pertinent negatives for hypoglycemia complications include no blackouts, no hospitalization, no nocturnal hypoglycemia, no required assistance and no required glucagon injection. Symptoms are stable. Diabetic complications include impotence. Pertinent negatives for diabetic complications include no autonomic neuropathy, CVA, heart disease, nephropathy, peripheral neuropathy, PVD or retinopathy. Risk  factors for coronary artery disease include obesity and stress. Current diabetic treatment includes insulin injections. He is compliant with treatment most of the time. He is currently taking insulin pre-breakfast. Insulin injections are given by patient. Rotation sites for injection include the abdominal wall. His weight is stable. He is following a diabetic diet. When asked about meal planning, he reported none. He has not had a previous visit with a dietitian. He participates in exercise daily. He monitors blood glucose at home 1-2 x per day. He monitors urine at home <1 x per month. Blood glucose monitoring compliance is adequate. There is no change in his home blood glucose trend. His breakfast blood glucose is taken between 7-8 am. His breakfast blood glucose range is generally 130-140 mg/dl. His lunch blood glucose is taken between 11-12 pm. His lunch blood glucose range is generally 110-130 mg/dl. His dinner blood glucose is taken between 5-6 pm. His dinner blood glucose range is generally 110-130 mg/dl. His highest blood glucose is 140-180 mg/dl. His overall blood glucose range is 140-180 mg/dl. An ACE inhibitor/angiotensin II receptor blocker is not being taken. He does not see a podiatrist.Eye exam is current.   Hypertension  This is a chronic problem. The current episode started more than 1 year ago. The problem is uncontrolled. Associated symptoms include headaches (chronic). Pertinent negatives include no anxiety, blurred vision, chest pain, malaise/fatigue, neck pain, orthopnea, palpitations, peripheral edema, PND, shortness of breath or sweats. Risk factors for coronary artery disease include family history, obesity and smoking/tobacco exposure. Current antihypertension treatment includes ACE inhibitors. The current treatment provides moderate improvement. Compliance problems include exercise, psychosocial issues, medication side effects, medication cost and diet.  There is no history of CVA, PVD or  retinopathy.   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  Since now has a daily headache can pinpoint the direct point of pain daily.  Slight improvement with restart of meds ) problem. The current episode started more than 1 year ago. The problem occurs daily. The problem has been unchanged. The pain is located in the parietal region. The pain does not radiate. The pain quality is similar to prior headaches. The quality of the pain is described as aching, dull and throbbing. The pain is at a severity of 4/10. The pain is moderate. Associated symptoms include insomnia (able to rest with vistaril) and scalp tenderness. Pertinent negatives include no abnormal behavior, back pain, blurred vision, dizziness, drainage, ear pain, eye redness, eye watering, facial sweating, hearing loss, loss of balance, muscle aches, neck pain, phonophobia, photophobia, seizures, sinus pressure, tinnitus, visual change, weakness or weight loss. Associated symptoms comments: Pain is described as sharp quick buzz or sting in the back of his head.  Patient can pinpoint the direct area.  Pain is aggravating every aspect of his life and further worsening his anxiety and depression   . Nothing aggravates the symptoms. He has tried acetaminophen, Excedrin and NSAIDs (Seen chiropractor and neurology with multiple medication trials no changes) for the symptoms. The treatment provided mild relief. His past medical history is significant for hypertension.      The following portions of the patient's history were reviewed and updated as appropriate: allergies, current medications, past family history, past medical history, past social history, past surgical history and problem list.      Review of Systems   Constitutional: Negative for activity change, malaise/fatigue, weight gain and weight loss.   HENT: Negative for ear pain, hearing loss, sinus  "pressure and tinnitus.    Eyes: Negative for blurred vision, photophobia and redness.   Respiratory: Negative.  Negative for shortness of breath.    Cardiovascular: Negative.  Negative for chest pain, palpitations, orthopnea and PND.   Endocrine: Negative for polydipsia, polyphagia and polyuria.   Genitourinary: Positive for impotence.   Musculoskeletal: Negative for back pain and neck pain.   Skin: Negative for pallor.   Neurological: Positive for headaches (chronic). Negative for dizziness, tremors, seizures, syncope, facial asymmetry, speech difficulty, weakness, light-headedness and loss of balance.   Psychiatric/Behavioral: Positive for suicidal ideas. Negative for agitation, behavioral problems, confusion, decreased concentration, self-injury and sleep disturbance. The patient is nervous/anxious and has insomnia (able to rest with vistaril).    All other systems reviewed and are negative.      Procedures    Vitals: Blood pressure 110/80, pulse 82, temperature 97.1 °F (36.2 °C), temperature source Temporal, height 177.8 cm (70\"), weight 121 kg (265 lb 12.8 oz), SpO2 98 %.     Allergies:   Allergies   Allergen Reactions   • Statins Other (See Comments)     All over pain,    • Zofran [Ondansetron] Nausea And Vomiting   • Zoloft [Sertraline Hcl] Nausea And Vomiting          Objective   Physical Exam   Constitutional: He is oriented to person, place, and time. He appears well-developed. No distress.   HENT:   Head: Normocephalic and atraumatic.   Right Ear: Hearing, tympanic membrane, external ear and ear canal normal.   Left Ear: Hearing, tympanic membrane, external ear and ear canal normal.   Nose: Nose normal. No mucosal edema or rhinorrhea. Right sinus exhibits no maxillary sinus tenderness and no frontal sinus tenderness. Left sinus exhibits no maxillary sinus tenderness and no frontal sinus tenderness.   Mouth/Throat: Uvula is midline. No oropharyngeal exudate. Tonsils are 0 on the right. Tonsils are 0 on the " left. No tonsillar exudate.   Eyes: Pupils are equal, round, and reactive to light. Conjunctivae are normal. Right eye exhibits no discharge. Left eye exhibits no discharge.   Neck: No thyromegaly present.   Cardiovascular: Normal rate, regular rhythm and normal heart sounds.   No murmur heard.  Pulmonary/Chest: Effort normal and breath sounds normal.   Musculoskeletal:      Right hip: Normal.      Left hip: Normal.      Right knee: Normal.      Cervical back: Normal.   Lymphadenopathy:     He has no cervical adenopathy.   Neurological: He is alert and oriented to person, place, and time. He has normal reflexes. He displays normal reflexes. No cranial nerve deficit or sensory deficit. He exhibits normal muscle tone. Coordination normal.   Skin: Skin is warm and dry. No rash noted. He is not diaphoretic. No erythema.          Psychiatric: His behavior is normal. Judgment and thought content normal.   Nursing note and vitals reviewed.      Assessment/Plan   Discussed with patient impression and plan, patient verbalizes understanding.        During this visit the following were done:  Labs Reviewed [x]    Labs Ordered []    Radiology Reports Reviewed []    Radiology Ordered []    PCP Records Reviewed []    Referring Provider Records Reviewed []    ER Records Reviewed []    Hospital Records Reviewed []    History Obtained From Family []    Radiology Images Reviewed []    Other Reviewed []    Records Requested [x]      Diagnoses and all orders for this visit:    1. Type 2 diabetes mellitus with hyperglycemia, with long-term current use of insulin (CMS/[facility]) (Primary)  Continue with current medications    2. Arthralgia, unspecified joint  Continue with improved activity    3. Chronic daily headache  Continue with current meds    4. Bipolar II disorder (CMS/[facility])  Continue with psych    5. Essential hypertension  Continue with current medications    6. Class 2 severe obesity due to excess calories with serious comorbidity  and body mass index (BMI) of 38.0 to 38.9 in adult (CMS/[facility])  Continue with current meds          Patient is a non smoker    Patient's Body mass index is 38.14 kg/m². BMI is above normal parameters. Recommendations include: exercise counseling and nutrition counseling.               Physical Exam  Vitals and nursing note reviewed.   Constitutional:       General: He is not in acute distress.     Appearance: He is well-developed. He is not diaphoretic.   HENT:      Head: Normocephalic and atraumatic.      Right Ear: Hearing, tympanic membrane, ear canal and external ear normal.      Left Ear: Hearing, tympanic membrane, ear canal and external ear normal.      Nose: Nose normal. No mucosal edema or rhinorrhea.      Right Sinus: No maxillary sinus tenderness or frontal sinus tenderness.      Left Sinus: No maxillary sinus tenderness or frontal sinus tenderness.      Mouth/Throat:      Pharynx: Uvula midline. No oropharyngeal exudate.      Tonsils: No tonsillar exudate. 0 on the right. 0 on the left.   Eyes:      General:         Right eye: No discharge.         Left eye: No discharge.      Conjunctiva/sclera: Conjunctivae normal.      Pupils: Pupils are equal, round, and reactive to light.   Neck:      Thyroid: No thyromegaly.   Cardiovascular:      Rate and Rhythm: Normal rate and regular rhythm.      Heart sounds: Normal heart sounds. No murmur heard.     Pulmonary:      Effort: Pulmonary effort is normal.      Breath sounds: Normal breath sounds.   Musculoskeletal:      Cervical back: Neck supple. Normal.      Right hip: Normal.      Left hip: Normal.      Right knee: Normal.   Lymphadenopathy:      Cervical: No cervical adenopathy.   Skin:     General: Skin is warm and dry.      Findings: No erythema or rash.      Comments:        Neurological:      Mental Status: He is alert and oriented to person, place, and time.      Cranial Nerves: No cranial nerve deficit.      Sensory: No sensory deficit.      Motor: No  abnormal muscle tone.      Coordination: Coordination normal.      Deep Tendon Reflexes: Reflexes are normal and symmetric. Reflexes normal.   Psychiatric:         Behavior: Behavior normal.         Thought Content: Thought content normal.         Judgment: Judgment normal.

## 2021-08-12 ENCOUNTER — OFFICE VISIT (OUTPATIENT)
Dept: PSYCHIATRY | Facility: CLINIC | Age: 46
End: 2021-08-12

## 2021-08-12 VITALS
WEIGHT: 267 LBS | SYSTOLIC BLOOD PRESSURE: 125 MMHG | HEIGHT: 70 IN | DIASTOLIC BLOOD PRESSURE: 85 MMHG | BODY MASS INDEX: 38.22 KG/M2 | HEART RATE: 90 BPM

## 2021-08-12 DIAGNOSIS — F51.05 INSOMNIA DUE TO MENTAL DISORDER: ICD-10-CM

## 2021-08-12 DIAGNOSIS — Z79.899 MEDICATION MANAGEMENT: ICD-10-CM

## 2021-08-12 DIAGNOSIS — F31.81 BIPOLAR II DISORDER (HCC): Primary | ICD-10-CM

## 2021-08-12 PROCEDURE — 99214 OFFICE O/P EST MOD 30 MIN: CPT | Performed by: NURSE PRACTITIONER

## 2021-08-12 RX ORDER — ZIPRASIDONE HYDROCHLORIDE 60 MG/1
60 CAPSULE ORAL 2 TIMES DAILY WITH MEALS
Qty: 60 CAPSULE | Refills: 1 | Status: SHIPPED | OUTPATIENT
Start: 2021-08-12 | End: 2021-09-23 | Stop reason: SDUPTHER

## 2021-08-12 RX ORDER — TRAZODONE HYDROCHLORIDE 100 MG/1
100 TABLET ORAL NIGHTLY
Qty: 30 TABLET | Refills: 1 | Status: SHIPPED | OUTPATIENT
Start: 2021-08-12 | End: 2021-09-23

## 2021-08-12 RX ORDER — HYDROXYZINE HYDROCHLORIDE 10 MG/1
10-20 TABLET, FILM COATED ORAL 3 TIMES DAILY PRN
Qty: 60 TABLET | Refills: 0 | Status: SHIPPED | OUTPATIENT
Start: 2021-08-12 | End: 2021-09-23 | Stop reason: SDUPTHER

## 2021-08-12 NOTE — PROGRESS NOTES
"Subjective   Thomas Mireles is a 45 y.o. male who is here today for in his follow-up appointment.      Chief Complaint: chronic depression, insomnia    HPI: Patient reports things are \"going okay actually.\"  \"Not really had any episodes of any kind.\" States his biggest obstacle since last visit was failed attempt to perform colonoscopy.  Sleep is \"touch and go,\" but he has slept well the last 2 nights with Geodon, hydroxyzine 10 mg and trazodone 50 mg. Predominantly has  problems falling asleep; occasionally wakes up spontaneously. States he has a large dog that sometimes sleeps in the bed with him.  Appetite is good. States  Anxiety is exacerbated due to trying to get back into the workforce and not having any response from potential employers.  PHQ score 13.  Identifies following symptoms of MDD occurring every last 2 weeks: Anhedonia, feeling depressed, insomnia, fatigue, anorexia, poor self-esteem, restlessness. Rates both depression and anxiety rated as 3/10 on 0-10 scale with 10 being the worst.  Continues to have fleeting thoughts of suicide that he does not entertain. Adamantly denies plan for self harm.     The following portions of the patient's history were reviewed and updated as appropriate: allergies, current medications, past family history, past medical history, past social history, past surgical history and problem list.    Family History:  Family History   Problem Relation Age of Onset   • Hypertension Mother    • Hypertension Father    • Diabetes Maternal Grandfather    • Heart disease Maternal Grandfather    • Mental illness Maternal Grandfather         PTSD   • Liver cancer Paternal Grandfather    • Asthma Paternal Grandfather          in .   • Diabetes Paternal Grandfather      Past Surgical History:  Past Surgical History:   Procedure Laterality Date   • COLONOSCOPY N/A 2021    Procedure: COLONOSCOPY;  Surgeon: Judy Grant MD;  Location: [facility] OR;  Service: " Gastroenterology;  Laterality: N/A;   • KNEE SURGERY  1992    right   • NASAL POLYP EXCISION  2009   • URETHRAL DILATATION      infant     Problem List:  Patient Active Problem List   Diagnosis   • Hypertension   • Hyperlipidemia   • Type 2 diabetes mellitus with hyperglycemia (CMS/HCC)   • Bipolar II disorder (CMS/HCC)   • Rosacea   • Abnormal EKG   • Old myocardial infarction by EKG criteria.   • Dyspnea on exertion (class 2-3)   • Head injury   • Chronic idiopathic constipation     Allergy:  Allergies   Allergen Reactions   • Statins Other (See Comments)     All over pain,    • Zofran [Ondansetron] Nausea And Vomiting   • Zoloft [Sertraline Hcl] Nausea And Vomiting     Current Medications:   Current Outpatient Medications   Medication Sig Dispense Refill   • aspirin 81 MG EC tablet Take 1 tablet by mouth Daily. 90 tablet 1   • bisacodyl (Dulcolax) 5 MG EC tablet Take 4 tablets at 8am with a full glass of water. 4 tablet 0   • butalbital-aspirin-caffeine (FIORINAL) -40 MG per capsule Take 1 capsule by mouth 2 (Two) Times a Day As Needed for Headache. 40 capsule 0   • cyclobenzaprine (FLEXERIL) 5 MG tablet Take 1 tablet by mouth At Night As Needed for Muscle Spasms. 90 tablet 1   • Dulaglutide (Trulicity) 1.5 MG/0.5ML solution pen-injector Inject 1.5 mg under the skin into the appropriate area as directed 1 (One) Time Per Week. 9 mL 1   • fenofibrate (TRICOR) 145 MG tablet Take 1 tablet by mouth Daily. 90 tablet 1   • glucose blood test strip For daily testing  E11.65 100 each 11   • glucose blood test strip Use as instructed to check sugar once daily 100 each 12   • glucose monitor monitoring kit 1 each As Needed (use to check sugar once daily). 1 each 0   • hydrOXYzine (ATARAX) 10 MG tablet Take 1-2 tablets by mouth 3 (Three) Times a Day As Needed (insomnia). 60 tablet 0   • Lancets misc 1 each Daily. 100 each 1   • linaclotide (Linzess) 145 MCG capsule capsule Take 1 capsule by mouth Daily. 30 minutes  before meals on an empty stomach. 30 capsule 5   • lisinopril (PRINIVIL,ZESTRIL) 5 MG tablet Take 1 tablet by mouth Daily. 90 tablet 1   • magnesium citrate solution Take 296 mL by mouth Take As Directed. Follow bowel prep instructions given at office 592 mL 0   • metoprolol tartrate (LOPRESSOR) 25 MG tablet Take 1 tablet by mouth Every 12 (Twelve) Hours. 180 tablet 1   • metroNIDAZOLE (METROGEL) 0.75 % gel Apply  topically to the appropriate area as directed 2 (Two) Times a Day. 45 g 5   • naproxen (Naprosyn) 500 MG tablet Take 1 tablet by mouth 2 (Two) Times a Day With Meals. 180 tablet 1   • Omega-3 Fatty Acids (fish oil) 1000 MG capsule capsule Take 1 capsule by mouth 2 (Two) Times a Day With Meals. 180 capsule 1   • pantoprazole (PROTONIX) 40 MG EC tablet Take 1 tablet by mouth Daily. 90 tablet 1   • polyethylene glycol (MIRALAX) 17 GM/SCOOP powder Take 17 g by mouth Daily. Mix with 8 oz liquid 510 g 5   • traZODone (DESYREL) 50 MG tablet Take 0.5-1 tablets by mouth Every Night. 30 tablet 1   • vitamin D (ERGOCALCIFEROL) 1.25 MG (34351 UT) capsule capsule Take 1 capsule by mouth Every 7 (Seven) Days. 5 capsule 2   • ziprasidone (Geodon) 60 MG capsule Take 1 capsule by mouth 2 (Two) Times a Day With Meals. 60 capsule 1     No current facility-administered medications for this visit.     Review of Systems  Review of Systems   Constitutional: Positive for activity change, appetite change and fatigue.   Musculoskeletal: Positive for arthralgias.   Neurological: Positive for light-headedness, numbness and headache.   Psychiatric/Behavioral: Positive for agitation, decreased concentration, sleep disturbance, depressed mood and stress. Negative for hallucinations, self-injury and suicidal ideas. The patient is nervous/anxious.    All other systems reviewed and are negative.    Objective   Physical Exam  Vitals reviewed.   Constitutional:       Appearance: He is well-groomed. He is obese.   Neurological:      Mental  "Status: He is alert and oriented to person, place, and time.      Gait: Gait is intact.   Psychiatric:         Attention and Perception: Attention and perception normal.         Mood and Affect: Mood normal.         Speech: Speech normal.         Behavior: Behavior normal. Behavior is cooperative.         Thought Content: Thought content is not paranoid or delusional. Thought content includes suicidal ideation. Thought content does not include homicidal ideation. Thought content does not include suicidal plan.         Cognition and Memory: Cognition is impaired. Memory is impaired.         Judgment: Judgment is impulsive.       Blood pressure 125/85, pulse 90, height 177.8 cm (70\"), weight 121 kg (267 lb).    Appearance: Thomas is a 45 year old  male. He appears clean, appropriately dressed. BMI 38.31    Gait, Station, Strength: Posture upright, gait steady. No obvious signs of impairment or acute distress. No abnormal muscle movements, motor tics or tremors observed.     Mental Status Exam:   Hygiene:   good  Cooperation:  Cooperative  Eye Contact:  Good  Psychomotor Behavior:  Appropriate  Affect:  Appropriate  Hopelessness: Denies  Optimistic:minimally  Speech:  Normal  Thought Process:  Goal directed and Linear  Thought Content:  Normal  Suicidal:  Suicidal Ideation and chronic passive, no active plan  Homicidal:  None  Hallucinations:  None  Delusion:  None  Memory:  Deficits  Orientation:  Person, Place, Time and Situation  Reliability:  fair  Insight:  Fair  Judgement:  Impaired  Impulse Control:  Impaired  Physical/Medical Issues:  DMII, HTN, Head injury, migraine,     PHQ-Score Total:  .Patient screened positive for depression based on a PHQ-9 score of 13 on 8/12/2021. Follow-up recommendations include: Prescribed antidepressant medication treatment   .    Assessment/Plan   Diagnoses and all orders for this visit:    1. Bipolar II disorder (CMS/[facility]) (Primary)  -     traZODone (DESYREL) 100 MG " tablet; Take 1 tablet by mouth Every Night.  Dispense: 30 tablet; Refill: 1  -     ziprasidone (Geodon) 60 MG capsule; Take 1 capsule by mouth 2 (Two) Times a Day With Meals.  Dispense: 60 capsule; Refill: 1    2. Insomnia due to mental disorder  -     hydrOXYzine (ATARAX) 10 MG tablet; Take 1-2 tablets by mouth 3 (Three) Times a Day As Needed (insomnia).  Dispense: 60 tablet; Refill: 0  -     traZODone (DESYREL) 100 MG tablet; Take 1 tablet by mouth Every Night.  Dispense: 30 tablet; Refill: 1    3. Medication management  -     ECG 12 Lead; Future  -     TSH  -     T4, free  -     Vitamin D 1,25 Dihydroxy  -     Hemoglobin A1c; Future  -     Lipid Panel; Future  -     Vitamin B12 & Folate  -     Ziprasidone; Future  -     Cancel: TSH  -     Cancel: T4, free  -     [facility] Drug Screen  -     Prolactin; Future    Patient states he takes meds as prescribed and takes Geodon with food twice daily. Takes hydroxyzine 10 mg and trazadone 50 mg at night and continues to experience insomnia.  Advised to use hydroxyzine only as needed to prevent worsening constipation. Benefits risks, side effects of increasing  trazodone to 100 mg discussed. Patient agreeable. Also discussed importance of measuring Qtc with use of Geodon and drawing other labs to assess metabolic function, vitamin/mineral deficiencies and  prolactin level. Patient agreeable and aware to fast prior to lab draw.      -Continue Geodon 60 mg twice daily with 500 calorie meal for Bipolar disorder symptoms    -Continue  hydroxyzine HCL 10 mg tab 10-20 mg three times daily as needed for anxiety, insomnia     -Increase trazodone 100 mg -  insomnia, depression     -Discussed utility in monitoring labs for baseline comparison. Importance of correcting vitamin and metabolic alterations to optimize psychiatric treatment.  Patient agreeable to provider ordering Order labs and EKG    -The benefits of a healthy diet and exercise were discussed with patient, especially the  positive effects they have on mental health. Patient encouraged to consider lifestyle modification regarding  diet and exercise patterns to maximize results of mental health treatment.      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 Normal metabolizer of other substrates    -Thomas Mireles  reports that he quit smoking about 12 years ago. He started smoking about 28 years ago. He has a 30.00 pack-year smoking history. He has never used smokeless tobacco.         Visit Diagnoses:    ICD-10-CM ICD-9-CM   1. Bipolar II disorder (CMS/[facility])  F31.81 296.89   2. Insomnia due to mental disorder  F51.05 300.9     327.02   3. Medication management  Z79.899 V58.69     TREATMENT PLAN/GOALS:  Treatment and medication options discussed during today's visit. Patient acknowledged and verbally consents to proceed with mutually agreed upon treatment plan. Patient reminded of important role medication adherence and regular follow-up appointments play in symptom alleviation and long term prognosis. Encouraged to Pursue  supportive psychotherapy and take medications as prescribed.    MEDICATION ISSUES:  Discussed medication treatment options and plan of prescribed medication. Potential risks, benefits, and side effects including but not limited to the following reviewed: Black Box warnings, worsening symptoms, SI, sedation, GI side effects, metabolic alterations and blood pressure fluctuations. Patient is reminded to refrain from illicit substance use, including alcohol while taking medications. Also advised to refrain from activity requiring  alertness until sedative effects of medication are assessed. Patient  agrees to call [facility] with any worsening of symptoms or onset of intolerable side effects. Patient is agreeable to call 911 or go to the nearest ER should he  begin having SI/HI.     PROGNOSIS  .Patient's diagnosis include Bipolar  II Disorder and Insomnia. Unique factors influencing symptom alleviation/remission include: pre-existing conditions, symptom chronicity, symptom severity, degree of impairment, social support, financial security, motivation, patient engagement and medication adherence. His prognosis  is unknown at this time    Prognosis: Guarded- dependent on patient's adherence to medication treatment plan and follow up appointments  Functionality: Patient not yet showing improvements in important areas of daily functioning.     Short-term goals: Patient will adhere to medication regimen and experience continued improvement in symptoms over the next 3 months.     Long-term goals: Patient will adhere to medication treatment plan and report improvement in symptoms over the next 6 months    MEDS ORDERED DURING VISIT:  New Medications Ordered This Visit   Medications   • hydrOXYzine (ATARAX) 10 MG tablet     Sig: Take 1-2 tablets by mouth 3 (Three) Times a Day As Needed (insomnia).     Dispense:  60 tablet     Refill:  0   • traZODone (DESYREL) 100 MG tablet     Sig: Take 1 tablet by mouth Every Night.     Dispense:  30 tablet     Refill:  1   • ziprasidone (Geodon) 60 MG capsule     Sig: Take 1 capsule by mouth 2 (Two) Times a Day With Meals.     Dispense:  60 capsule     Refill:  1     Return in about 6 weeks (around 9/23/2021).          This document has been electronically signed by LAUREN Oswald   August 12, 2021 15:10 EDT    Part of this note may be an electronic transcription/translation of spoken language to printed text using the Dragon Dictation System.      Answers for HPI/ROS submitted by the patient on 8/5/2021  What is the primary reason for your visit?: Other  Please describe your symptoms.: Medicine follow up.  Have you had these symptoms before?: Yes  How long have you been having these symptoms?: Greater than 2 weeks  Please describe any probable cause for these symptoms. : Chemical imbalance.

## 2021-08-26 NOTE — PROGRESS NOTES
Contacted patient and informed him he had pending labs that needed to be completed. He voiced understanding and stated he would get those done within a week.I have postponed them for a week to allow him more time.

## 2021-08-27 ENCOUNTER — HOSPITAL ENCOUNTER (OUTPATIENT)
Dept: RESPIRATORY THERAPY | Facility: HOSPITAL | Age: 46
Discharge: HOME | End: 2021-08-27

## 2021-08-27 ENCOUNTER — LAB (OUTPATIENT)
Dept: LAB | Facility: HOSPITAL | Age: 46
End: 2021-08-27

## 2021-08-27 DIAGNOSIS — Z79.899 MEDICATION MANAGEMENT: ICD-10-CM

## 2021-08-27 LAB
6-ACETYL MORPHINE: NEGATIVE
AMPHET+METHAMPHET UR QL: NEGATIVE
BARBITURATES UR QL SCN: NEGATIVE
BENZODIAZ UR QL SCN: NEGATIVE
BUPRENORPHINE SERPL-MCNC: NEGATIVE NG/ML
CANNABINOIDS SERPL QL: NEGATIVE
CHOLEST SERPL-MCNC: 169 MG/DL (ref 0–200)
COCAINE UR QL: NEGATIVE
FOLATE SERPL-MCNC: 11.6 NG/ML (ref 4.78–24.2)
HBA1C MFR BLD: 5.64 % (ref 4.8–5.6)
HDLC SERPL-MCNC: 32 MG/DL (ref 40–60)
LDLC SERPL CALC-MCNC: 107 MG/DL (ref 0–100)
LDLC/HDLC SERPL: 3.21 {RATIO}
METHADONE UR QL SCN: NEGATIVE
OPIATES UR QL: NEGATIVE
OXYCODONE UR QL SCN: NEGATIVE
PCP UR QL SCN: NEGATIVE
PROLACTIN SERPL-MCNC: 15.7 NG/ML (ref 4.04–15.2)
T4 FREE SERPL-MCNC: 1.37 NG/DL (ref 0.93–1.7)
TRIGL SERPL-MCNC: 171 MG/DL (ref 0–150)
TSH SERPL DL<=0.05 MIU/L-ACNC: 0.62 UIU/ML (ref 0.27–4.2)
VIT B12 BLD-MCNC: 306 PG/ML (ref 211–946)
VLDLC SERPL-MCNC: 30 MG/DL (ref 5–40)

## 2021-08-27 PROCEDURE — 80307 DRUG TEST PRSMV CHEM ANLYZR: CPT

## 2021-08-27 PROCEDURE — 93005 ELECTROCARDIOGRAM TRACING: CPT | Performed by: NURSE PRACTITIONER

## 2021-08-27 PROCEDURE — 84443 ASSAY THYROID STIM HORMONE: CPT | Performed by: NURSE PRACTITIONER

## 2021-08-27 PROCEDURE — 84146 ASSAY OF PROLACTIN: CPT

## 2021-08-27 PROCEDURE — 83036 HEMOGLOBIN GLYCOSYLATED A1C: CPT

## 2021-08-27 PROCEDURE — 82607 VITAMIN B-12: CPT | Performed by: NURSE PRACTITIONER

## 2021-08-27 PROCEDURE — 82746 ASSAY OF FOLIC ACID SERUM: CPT | Performed by: NURSE PRACTITIONER

## 2021-08-27 PROCEDURE — G0480 DRUG TEST DEF 1-7 CLASSES: HCPCS

## 2021-08-27 PROCEDURE — 36415 COLL VENOUS BLD VENIPUNCTURE: CPT

## 2021-08-27 PROCEDURE — 84439 ASSAY OF FREE THYROXINE: CPT | Performed by: NURSE PRACTITIONER

## 2021-08-27 PROCEDURE — 82652 VIT D 1 25-DIHYDROXY: CPT | Performed by: NURSE PRACTITIONER

## 2021-08-27 PROCEDURE — 80061 LIPID PANEL: CPT

## 2021-08-28 LAB
QT INTERVAL: 374 MS
QTC INTERVAL: 426 MS

## 2021-08-30 LAB — 1,25(OH)2D SERPL-MCNC: 51 PG/ML (ref 19.9–79.3)

## 2021-09-04 LAB — ZIPRASIDONE [MASS/VOLUME] IN SERUM OR PLASMA: 59.1 NG/ML

## 2021-09-20 ENCOUNTER — TELEPHONE (OUTPATIENT)
Dept: GASTROENTEROLOGY | Facility: CLINIC | Age: 46
End: 2021-09-20

## 2021-09-23 ENCOUNTER — OFFICE VISIT (OUTPATIENT)
Dept: PSYCHIATRY | Facility: CLINIC | Age: 46
End: 2021-09-23

## 2021-09-23 VITALS
BODY MASS INDEX: 37.48 KG/M2 | WEIGHT: 261.8 LBS | HEART RATE: 83 BPM | SYSTOLIC BLOOD PRESSURE: 127 MMHG | HEIGHT: 70 IN | DIASTOLIC BLOOD PRESSURE: 88 MMHG

## 2021-09-23 DIAGNOSIS — F31.81 BIPOLAR II DISORDER (HCC): Primary | ICD-10-CM

## 2021-09-23 DIAGNOSIS — F51.05 INSOMNIA DUE TO MENTAL DISORDER: ICD-10-CM

## 2021-09-23 DIAGNOSIS — Z79.899 MEDICATION MANAGEMENT: ICD-10-CM

## 2021-09-23 PROCEDURE — 99214 OFFICE O/P EST MOD 30 MIN: CPT | Performed by: NURSE PRACTITIONER

## 2021-09-23 RX ORDER — HYDROXYZINE HYDROCHLORIDE 10 MG/1
10-20 TABLET, FILM COATED ORAL 3 TIMES DAILY PRN
Qty: 60 TABLET | Refills: 0 | Status: SHIPPED | OUTPATIENT
Start: 2021-09-23 | End: 2022-04-01 | Stop reason: SDUPTHER

## 2021-09-23 RX ORDER — TRAZODONE HYDROCHLORIDE 100 MG/1
150 TABLET ORAL NIGHTLY
Qty: 30 TABLET | Refills: 1 | Status: SHIPPED | OUTPATIENT
Start: 2021-09-23 | End: 2022-04-01 | Stop reason: SDUPTHER

## 2021-09-23 RX ORDER — ZIPRASIDONE HYDROCHLORIDE 60 MG/1
60 CAPSULE ORAL 2 TIMES DAILY WITH MEALS
Qty: 60 CAPSULE | Refills: 1 | Status: SHIPPED | OUTPATIENT
Start: 2021-09-23 | End: 2022-04-01 | Stop reason: SDUPTHER

## 2021-09-23 NOTE — PROGRESS NOTES
"Subjective   Thomas Mireles is a 45 y.o. male who is here today for in his follow-up appointment.      Chief Complaint: chronic depression, insomnia    HPI: Patient states \"quite a bit has changed\" since his last visit. He got a Job working as [employer] at [employer] but today is his last day.  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  Patient feels he will be more comfortable working in the kitchen due to his previous work experience. Reports he  thought about  going back to college, but is hesitant due to age and assuming more debt. 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 He denies feeling manic during this time. States he has had some \"fleeting down points\". PHQ score 1. Rates depression as 5/10 on 0-10 scale with 10 being the worst.  Denies active SI/HI/AVH. Reports increased \"what if\" worries related  to job changes. Estimates sleep quality improved 20-30% after increasing trazodone.  Biggest problem is initiating asleep and often wakes every 2 hours.  Denies nightmares. Provider  discussed risk factors for sleep apnea and suggested he further discuss with PCP.    The following portions of the patient's history were reviewed and updated as appropriate: allergies, current medications, past family history, past medical history, past social history, past surgical history and problem list.    Family History:  Family History   Problem Relation Age of Onset   • Hypertension Mother    • Hypertension Father    • Diabetes Maternal Grandfather    • Heart disease Maternal Grandfather    • Mental illness Maternal Grandfather         PTSD   • Liver cancer Paternal Grandfather    • Asthma Paternal Grandfather          in .   • Diabetes Paternal Grandfather      Past Surgical History:  Past Surgical History:   Procedure Laterality " Date   • COLONOSCOPY N/A 7/27/2021    Procedure: COLONOSCOPY;  Surgeon: Judy Grant MD;  Location: [facility];  Service: Gastroenterology;  Laterality: N/A;   • KNEE SURGERY  1992    right   • NASAL POLYP EXCISION  2009   • URETHRAL DILATATION      infant     Problem List:  Patient Active Problem List   Diagnosis   • Hypertension   • Hyperlipidemia   • Type 2 diabetes mellitus with hyperglycemia (CMS/HCC)   • Bipolar II disorder (CMS/HCC)   • Rosacea   • Abnormal EKG   • Old myocardial infarction by EKG criteria.   • Dyspnea on exertion (class 2-3)   • Head injury   • Chronic idiopathic constipation     Allergy:  Allergies   Allergen Reactions   • Statins Other (See Comments)     All over pain,    • Zofran [Ondansetron] Nausea And Vomiting   • Zoloft [Sertraline Hcl] Nausea And Vomiting     Current Medications:   Current Outpatient Medications   Medication Sig Dispense Refill   • aspirin 81 MG EC tablet Take 1 tablet by mouth Daily. 90 tablet 1   • bisacodyl (Dulcolax) 5 MG EC tablet Take 4 tablets at 8am with a full glass of water. 4 tablet 0   • butalbital-aspirin-caffeine (FIORINAL) -40 MG per capsule Take 1 capsule by mouth 2 (Two) Times a Day As Needed for Headache. 40 capsule 0   • cyclobenzaprine (FLEXERIL) 5 MG tablet Take 1 tablet by mouth At Night As Needed for Muscle Spasms. 90 tablet 1   • Dulaglutide (Trulicity) 1.5 MG/0.5ML solution pen-injector Inject 1.5 mg under the skin into the appropriate area as directed 1 (One) Time Per Week. 9 mL 1   • fenofibrate (TRICOR) 145 MG tablet Take 1 tablet by mouth Daily. 90 tablet 1   • glucose blood test strip For daily testing  E11.65 100 each 11   • glucose blood test strip Use as instructed to check sugar once daily 100 each 12   • glucose monitor monitoring kit 1 each As Needed (use to check sugar once daily). 1 each 0   • hydrOXYzine (ATARAX) 10 MG tablet Take 1-2 tablets by mouth 3 (Three) Times a Day As Needed (insomnia). 60 tablet 0   •  Lancets misc 1 each Daily. 100 each 1   • linaclotide (Linzess) 145 MCG capsule capsule Take 1 capsule by mouth Daily. 30 minutes before meals on an empty stomach. 30 capsule 5   • lisinopril (PRINIVIL,ZESTRIL) 5 MG tablet Take 1 tablet by mouth Daily. 90 tablet 1   • magnesium citrate solution Take 296 mL by mouth Take As Directed. Follow bowel prep instructions given at office 592 mL 0   • metoprolol tartrate (LOPRESSOR) 25 MG tablet Take 1 tablet by mouth Every 12 (Twelve) Hours. 180 tablet 1   • metroNIDAZOLE (METROGEL) 0.75 % gel Apply  topically to the appropriate area as directed 2 (Two) Times a Day. 45 g 5   • naproxen (Naprosyn) 500 MG tablet Take 1 tablet by mouth 2 (Two) Times a Day With Meals. 180 tablet 1   • Omega-3 Fatty Acids (fish oil) 1000 MG capsule capsule Take 1 capsule by mouth 2 (Two) Times a Day With Meals. 180 capsule 1   • pantoprazole (PROTONIX) 40 MG EC tablet Take 1 tablet by mouth Daily. 90 tablet 1   • polyethylene glycol (MIRALAX) 17 GM/SCOOP powder Take 17 g by mouth Daily. Mix with 8 oz liquid 510 g 5   • traZODone (DESYREL) 100 MG tablet Take 1 tablet by mouth Every Night. 30 tablet 1   • vitamin D (ERGOCALCIFEROL) 1.25 MG (34454 UT) capsule capsule Take 1 capsule by mouth Every 7 (Seven) Days. 5 capsule 2   • ziprasidone (Geodon) 60 MG capsule Take 1 capsule by mouth 2 (Two) Times a Day With Meals. 60 capsule 1     No current facility-administered medications for this visit.     Review of Systems  Review of Systems   Constitutional: Positive for activity change, appetite change and fatigue.   Musculoskeletal: Positive for arthralgias.   Neurological: Positive for light-headedness, numbness and headache.   Psychiatric/Behavioral: Positive for agitation, decreased concentration, sleep disturbance, depressed mood and stress. Negative for hallucinations, self-injury and suicidal ideas. The patient is nervous/anxious.    All other systems reviewed and are negative.    Objective  "  Physical Exam  Vitals reviewed.   Constitutional:       Appearance: He is well-groomed. He is obese.   Neurological:      Mental Status: He is alert and oriented to person, place, and time.      Gait: Gait is intact.   Psychiatric:         Attention and Perception: Attention and perception normal.         Mood and Affect: Mood normal.         Speech: Speech normal.         Behavior: Behavior normal. Behavior is cooperative.         Thought Content: Thought content is not paranoid or delusional. Thought content does not include homicidal or suicidal ideation. Thought content does not include suicidal plan.         Cognition and Memory: Cognition is impaired. Memory is impaired.         Judgment: Judgment is impulsive.       Blood pressure 127/88, pulse 83, height 177.8 cm (70\"), weight 119 kg (261 lb 12.8 oz).    Appearance: Thomas is a 45 year old  male. He appears clean, appropriately dressed. BMI 37.56    Gait, Station, Strength: Posture upright, gait steady. No obvious signs of impairment or acute distress. No abnormal muscle movements, motor tics or tremors observed.     Mental Status Exam:   Hygiene:   good  Cooperation:  Cooperative  Eye Contact:  Good  Psychomotor Behavior:  Appropriate  Affect:  Appropriate  Hopelessness: Denies  Optimistic:minimally  Speech:  Normal  Thought Process:  Goal directed and Linear  Thought Content:  Normal  Suicidal:  Suicidal Ideation and chronic passive, no active plan  Homicidal:  None  Hallucinations:  None  Delusion:  None  Memory:  Deficits  Orientation:  Person, Place, Time and Situation  Reliability:  fair  Insight:  Fair  Judgement:  Impaired  Impulse Control:  Impaired  Physical/Medical Issues:  DMII, HTN, Head injury, migraine,     PHQ-Score Total:  .Patient screened positive for depression based on a PHQ-9 score of 1 on 9/23/2021. Follow-up recommendations include: Prescribed antidepressant medication treatment   .  Assessment/Plan   Diagnoses and all " orders for this visit:    1. Bipolar II disorder (HCC) (Primary)  -     ziprasidone (Geodon) 60 MG capsule; Take 1 capsule by mouth 2 (Two) Times a Day With Meals.  Dispense: 60 capsule; Refill: 1  -     traZODone (DESYREL) 100 MG tablet; Take 1.5 tablets by mouth Every Night.  Dispense: 30 tablet; Refill: 1    2. Insomnia due to mental disorder  -     hydrOXYzine (ATARAX) 10 MG tablet; Take 1-2 tablets by mouth 3 (Three) Times a Day As Needed (insomnia).  Dispense: 60 tablet; Refill: 0  -     traZODone (DESYREL) 100 MG tablet; Take 1.5 tablets by mouth Every Night.  Dispense: 30 tablet; Refill: 1    3. Medication management    Patient states he takes medication as prescribed and denies medication side effects.  Sleep quality somewhat improved with increase of trazodone.  Medication options discussed including increasing trazodone.  Patient agreeable to plan.      -Continue Geodon 60 mg twice daily with 500 calorie meal for Bipolar disorder symptoms    -Continue  hydroxyzine HCL 10 mg tab 10-20 mg three times daily as needed for anxiety, insomnia     -Increase trazodone 150 mg -  insomnia, depression     -Discussed lab results.     - Suggested SL  B12 supplement due to levels being on low end of normal    -The benefits of a healthy diet and exercise were discussed with patient, especially the positive effects they have on mental health. Patient encouraged to consider lifestyle modification regarding  diet and exercise patterns to maximize results of mental health treatment.    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 Normal metabolizer of other substrates    -Thomas Mireles  reports that he quit smoking about 12 years ago. He started smoking about 28 years ago. He has a 30.00 pack-year smoking history. He has never used smokeless tobacco.         Visit Diagnoses:    ICD-10-CM ICD-9-CM   1.  Bipolar II disorder (HCC)  F31.81 296.89   2. Insomnia due to mental disorder  F51.05 300.9     327.02   3. Situational anxiety  F41.8 300.09   4. Medication management  Z79.899 V58.69     TREATMENT PLAN/GOALS:  Treatment and medication options discussed during today's visit. Patient acknowledged and verbally consents to proceed with mutually agreed upon treatment plan. Patient reminded of important role medication adherence and regular follow-up appointments play in symptom alleviation and long term prognosis. Encouraged to take medications as prescribed.    MEDICATION ISSUES:  Discussed medication treatment options and plan of prescribed medication. Potential risks, benefits, and side effects including but not limited to the following reviewed: Black Box warnings, worsening symptoms, SI, sedation, GI side effects, metabolic alterations and blood pressure fluctuations. Patient is reminded to refrain from illicit substance use, including alcohol while taking medications. Also advised to refrain from activity requiring  alertness until sedative effects of medication are assessed. Patient  agrees to call [facility] with any worsening of symptoms or onset of intolerable side effects. Patient is agreeable to call 911 or go to the nearest ER should he  begin having SI/HI.     PROGNOSIS  Prognosis: Guarded- dependent on patient's adherence to medication treatment plan and follow up appointments  Functionality: Patient showing improvements in important areas of daily functioning.     Short-term goals: Patient will adhere to medication regimen and experience continued improvement in symptoms over the next 3 months.     Long-term goals: Patient will adhere to medication treatment plan and report improvement in symptoms over the next 6 months    MEDS ORDERED DURING VISIT:  New Medications Ordered This Visit   Medications   • hydrOXYzine (ATARAX) 10 MG tablet     Sig: Take 1-2 tablets by mouth 3 (Three) Times a Day As  Needed (insomnia).     Dispense:  60 tablet     Refill:  0   • ziprasidone (Geodon) 60 MG capsule     Sig: Take 1 capsule by mouth 2 (Two) Times a Day With Meals.     Dispense:  60 capsule     Refill:  1   • traZODone (DESYREL) 100 MG tablet     Sig: Take 1.5 tablets by mouth Every Night.     Dispense:  30 tablet     Refill:  1     Return in about 6 weeks (around 11/4/2021).          This document has been electronically signed by LAUREN Oswald   September 23, 2021 10:31 EDT    Part of this note may be an electronic transcription/translation of spoken language to printed text using the Dragon Dictation System.      Answers for HPI/ROS submitted by the patient on 9/23/2021  What is the primary reason for your visit?: Other  Please describe your symptoms.: Mental health care  Have you had these symptoms before?: Yes  How long have you been having these symptoms?: Greater than 2 weeks

## 2021-10-02 DIAGNOSIS — F31.81 BIPOLAR II DISORDER (HCC): ICD-10-CM

## 2021-10-02 DIAGNOSIS — G47.00 INSOMNIA, UNSPECIFIED TYPE: ICD-10-CM

## 2021-10-02 DIAGNOSIS — F32.A DEPRESSION WITH SUICIDAL IDEATION: ICD-10-CM

## 2021-10-02 DIAGNOSIS — R45.851 DEPRESSION WITH SUICIDAL IDEATION: ICD-10-CM

## 2021-10-04 RX ORDER — HYDROXYZINE PAMOATE 25 MG/1
50 CAPSULE ORAL NIGHTLY PRN
Qty: 90 CAPSULE | Refills: 1 | OUTPATIENT
Start: 2021-10-04

## 2021-10-27 ENCOUNTER — OFFICE VISIT (OUTPATIENT)
Dept: GASTROENTEROLOGY | Facility: CLINIC | Age: 46
End: 2021-10-27

## 2021-10-27 VITALS — BODY MASS INDEX: 36.13 KG/M2 | WEIGHT: 252.4 LBS | HEIGHT: 70 IN

## 2021-10-27 DIAGNOSIS — K59.04 CHRONIC IDIOPATHIC CONSTIPATION: Primary | ICD-10-CM

## 2021-10-27 PROCEDURE — 99213 OFFICE O/P EST LOW 20 MIN: CPT | Performed by: INTERNAL MEDICINE

## 2021-10-27 NOTE — PROGRESS NOTES
Chief Complaint   Patient presents with   • colonoscopy follow up       Thomas Mireles is a 45 y.o. male who presents to the office today for evaluation of colonoscopy follow up  .    HPI      Review of Systems   Constitutional: Negative for fever.   HENT: Negative for trouble swallowing.    Eyes: Negative.    Respiratory: Negative for chest tightness.    Cardiovascular: Negative for chest pain.   Gastrointestinal: Positive for abdominal pain and constipation. Negative for abdominal distention, anal bleeding, blood in stool, diarrhea, nausea, rectal pain and vomiting.   Endocrine: Negative.    Genitourinary: Negative for difficulty urinating.   Musculoskeletal: Positive for back pain.   Skin: Negative.    Allergic/Immunologic: Negative for environmental allergies and food allergies.   Neurological: Negative for headaches.   Hematological: Does not bruise/bleed easily.   Psychiatric/Behavioral: Negative.        ACTIVE PROBLEMS:   Specialty Problems        Gastroenterology Problems    Chronic idiopathic constipation              PAST MEDICAL HISTORY:  Past Medical History:   Diagnosis Date   • Anxiety    • Bipolar 1 disorder (HCC)    • Depression    • Head injury 2016   • HL (hearing loss)    • Hyperlipidemia    • Hypertension    • Kidney stone 01/25/18   • Migraine    • Obesity    • Psychiatric illness    • Type 2 diabetes mellitus (HCC)        SURGICAL HISTORY:  Past Surgical History:   Procedure Laterality Date   • COLONOSCOPY N/A 7/27/2021    Procedure: COLONOSCOPY;  Surgeon: Judy Grant MD;  Location: [facility];  Service: Gastroenterology;  Laterality: N/A;   • KNEE SURGERY  1992    right   • NASAL POLYP EXCISION  2009   • URETHRAL DILATATION      infant       FAMILY HISTORY:  Family History   Problem Relation Age of Onset   • Hypertension Mother    • Hypertension Father    • Diabetes Maternal Grandfather    • Heart disease Maternal Grandfather    • Mental illness Maternal Grandfather         PTSD    • Liver cancer Paternal Grandfather    • Asthma Paternal Grandfather          in .   • Diabetes Paternal Grandfather        SOCIAL HISTORY:  Social History     Tobacco Use   • Smoking status: Former Smoker     Packs/day: 2.00     Years: 15.00     Pack years: 30.00     Start date: 1993     Quit date: 3/6/2009     Years since quittin.6   • Smokeless tobacco: Never Used   Substance Use Topics   • Alcohol use: Yes     Alcohol/week: 1.0 standard drink     Types: 1 Cans of beer per week     Comment: Very seldom.       CURRENT MEDICATION:    Current Outpatient Medications:   •  aspirin 81 MG EC tablet, Take 1 tablet by mouth Daily., Disp: 90 tablet, Rfl: 1  •  bisacodyl (Dulcolax) 5 MG EC tablet, Take 4 tablets at 8am with a full glass of water., Disp: 4 tablet, Rfl: 0  •  butalbital-aspirin-caffeine (FIORINAL) -40 MG per capsule, Take 1 capsule by mouth 2 (Two) Times a Day As Needed for Headache., Disp: 40 capsule, Rfl: 0  •  cyclobenzaprine (FLEXERIL) 5 MG tablet, Take 1 tablet by mouth At Night As Needed for Muscle Spasms., Disp: 90 tablet, Rfl: 1  •  Dulaglutide (Trulicity) 1.5 MG/0.5ML solution pen-injector, Inject 1.5 mg under the skin into the appropriate area as directed 1 (One) Time Per Week., Disp: 9 mL, Rfl: 1  •  fenofibrate (TRICOR) 145 MG tablet, Take 1 tablet by mouth Daily., Disp: 90 tablet, Rfl: 1  •  glucose blood test strip, For daily testing  E11.65, Disp: 100 each, Rfl: 11  •  glucose blood test strip, Use as instructed to check sugar once daily, Disp: 100 each, Rfl: 12  •  glucose monitor monitoring kit, 1 each As Needed (use to check sugar once daily)., Disp: 1 each, Rfl: 0  •  hydrOXYzine (ATARAX) 10 MG tablet, Take 1-2 tablets by mouth 3 (Three) Times a Day As Needed (insomnia)., Disp: 60 tablet, Rfl: 0  •  Lancets misc, 1 each Daily., Disp: 100 each, Rfl: 1  •  linaclotide (Linzess) 145 MCG capsule capsule, Take 1 capsule by mouth Daily. 30 minutes before meals on an empty  stomach., Disp: 30 capsule, Rfl: 5  •  lisinopril (PRINIVIL,ZESTRIL) 5 MG tablet, Take 1 tablet by mouth Daily., Disp: 90 tablet, Rfl: 1  •  magnesium citrate solution, Take 296 mL by mouth Take As Directed. Follow bowel prep instructions given at office, Disp: 592 mL, Rfl: 0  •  metoprolol tartrate (LOPRESSOR) 25 MG tablet, Take 1 tablet by mouth Every 12 (Twelve) Hours., Disp: 180 tablet, Rfl: 1  •  metroNIDAZOLE (METROGEL) 0.75 % gel, Apply  topically to the appropriate area as directed 2 (Two) Times a Day., Disp: 45 g, Rfl: 5  •  naproxen (Naprosyn) 500 MG tablet, Take 1 tablet by mouth 2 (Two) Times a Day With Meals., Disp: 180 tablet, Rfl: 1  •  Omega-3 Fatty Acids (fish oil) 1000 MG capsule capsule, Take 1 capsule by mouth 2 (Two) Times a Day With Meals., Disp: 180 capsule, Rfl: 1  •  pantoprazole (PROTONIX) 40 MG EC tablet, Take 1 tablet by mouth Daily., Disp: 90 tablet, Rfl: 1  •  polyethylene glycol (MIRALAX) 17 GM/SCOOP powder, Take 17 g by mouth Daily. Mix with 8 oz liquid, Disp: 510 g, Rfl: 5  •  traZODone (DESYREL) 100 MG tablet, Take 1.5 tablets by mouth Every Night., Disp: 30 tablet, Rfl: 1  •  vitamin D (ERGOCALCIFEROL) 1.25 MG (27129 UT) capsule capsule, Take 1 capsule by mouth Every 7 (Seven) Days., Disp: 5 capsule, Rfl: 2  •  ziprasidone (Geodon) 60 MG capsule, Take 1 capsule by mouth 2 (Two) Times a Day With Meals., Disp: 60 capsule, Rfl: 1    ALLERGIES:  Statins, Zofran [ondansetron], and Zoloft [sertraline hcl]    VISIT VITALS:  There were no vitals taken for this visit.  Physical Exam      Assessment/Plan     No diagnosis found.    No follow-ups on file.                   This document has been electronically signed by Izzy Huang MA  October 27, 2021 13:13 EDT    Part of this note may be an electronic transcription/translation of spoken language to printed text using the Dragon Dictation System.

## 2021-10-27 NOTE — PROGRESS NOTES
Subjective   Thomas Mireles is a 45 y.o. male who presents to the office today as a follow up appointment regarding colonoscopy follow up      History of Present Illness:  The patient presents for follow up constipation.  An attempt was made to perform a colonoscopy but prep was inadequate.  He was placed on Linzess 145 mcg daily.  He feels like he is emptying well.  He will generally have a large bm and usually just one when he does have one.  He has been having about 1 times a week.  He states he eats vegetables but not much in the form of other fiber due to diabetes.  He is fearful that he will get too many carbohydrates.  No BRBPR.  He has noticed since he has been having more bowel movements, he is losing weight.  He no longer feels bloated.  No family h/o colon cancer.  His stool is a Crosby score 4.      Review of Systems:  Review of Systems   Constitutional: Negative for fever.   HENT: Negative for trouble swallowing.    Eyes: Negative.    Respiratory: Negative for chest tightness.    Cardiovascular: Negative for chest pain.   Gastrointestinal: Positive for abdominal pain and constipation. Negative for abdominal distention, anal bleeding, blood in stool, diarrhea, nausea, rectal pain and vomiting.   Endocrine: Negative.    Genitourinary: Negative for difficulty urinating.   Musculoskeletal: Positive for back pain.   Skin: Negative.    Allergic/Immunologic: Negative for environmental allergies and food allergies.   Neurological: Negative for headaches.   Hematological: Does not bruise/bleed easily.   Psychiatric/Behavioral: Negative.        Past Medical History:  Past Medical History:   Diagnosis Date   • Anxiety    • Bipolar 1 disorder (HCC)    • Depression    • Head injury 2016   • HL (hearing loss)    • Hyperlipidemia    • Hypertension    • Kidney stone 01/25/18   • Migraine    • Obesity    • Psychiatric illness    • Type 2 diabetes mellitus (HCC)        Past Surgical History:  Past Surgical History:    Procedure Laterality Date   • COLONOSCOPY N/A 2021    Procedure: COLONOSCOPY;  Surgeon: Judy Grant MD;  Location: [facility];  Service: Gastroenterology;  Laterality: N/A;   • KNEE SURGERY      right   • NASAL POLYP EXCISION     • URETHRAL DILATATION      infant       Family History:  Family History   Problem Relation Age of Onset   • Hypertension Mother    • Hypertension Father    • Diabetes Maternal Grandfather    • Heart disease Maternal Grandfather    • Mental illness Maternal Grandfather         PTSD   • Liver cancer Paternal Grandfather    • Asthma Paternal Grandfather          in .   • Diabetes Paternal Grandfather        Social History:  Social History     Socioeconomic History   • Marital status: Single   Tobacco Use   • Smoking status: Former Smoker     Packs/day: 2.00     Years: 15.00     Pack years: 30.00     Start date: 1993     Quit date: 3/6/2009     Years since quittin.6   • Smokeless tobacco: Never Used   Vaping Use   • Vaping Use: Never used   Substance and Sexual Activity   • Alcohol use: Yes     Alcohol/week: 1.0 standard drink     Types: 1 Cans of beer per week     Comment: Very seldom.   • Drug use: No   • Sexual activity: Not Currently     Partners: Female     Birth control/protection: Condom       Current Medication List:    Current Outpatient Medications:   •  aspirin 81 MG EC tablet, Take 1 tablet by mouth Daily., Disp: 90 tablet, Rfl: 1  •  bisacodyl (Dulcolax) 5 MG EC tablet, Take 4 tablets at 8am with a full glass of water., Disp: 4 tablet, Rfl: 0  •  butalbital-aspirin-caffeine (FIORINAL) -40 MG per capsule, Take 1 capsule by mouth 2 (Two) Times a Day As Needed for Headache., Disp: 40 capsule, Rfl: 0  •  cyclobenzaprine (FLEXERIL) 5 MG tablet, Take 1 tablet by mouth At Night As Needed for Muscle Spasms., Disp: 90 tablet, Rfl: 1  •  Dulaglutide (Trulicity) 1.5 MG/0.5ML solution pen-injector, Inject 1.5 mg under the skin into the  appropriate area as directed 1 (One) Time Per Week., Disp: 9 mL, Rfl: 1  •  fenofibrate (TRICOR) 145 MG tablet, Take 1 tablet by mouth Daily., Disp: 90 tablet, Rfl: 1  •  glucose blood test strip, For daily testing  E11.65, Disp: 100 each, Rfl: 11  •  glucose blood test strip, Use as instructed to check sugar once daily, Disp: 100 each, Rfl: 12  •  glucose monitor monitoring kit, 1 each As Needed (use to check sugar once daily)., Disp: 1 each, Rfl: 0  •  hydrOXYzine (ATARAX) 10 MG tablet, Take 1-2 tablets by mouth 3 (Three) Times a Day As Needed (insomnia)., Disp: 60 tablet, Rfl: 0  •  Lancets misc, 1 each Daily., Disp: 100 each, Rfl: 1  •  linaclotide (Linzess) 145 MCG capsule capsule, Take 1 capsule by mouth Daily. 30 minutes before meals on an empty stomach., Disp: 30 capsule, Rfl: 5  •  lisinopril (PRINIVIL,ZESTRIL) 5 MG tablet, Take 1 tablet by mouth Daily., Disp: 90 tablet, Rfl: 1  •  magnesium citrate solution, Take 296 mL by mouth Take As Directed. Follow bowel prep instructions given at office, Disp: 592 mL, Rfl: 0  •  metoprolol tartrate (LOPRESSOR) 25 MG tablet, Take 1 tablet by mouth Every 12 (Twelve) Hours., Disp: 180 tablet, Rfl: 1  •  metroNIDAZOLE (METROGEL) 0.75 % gel, Apply  topically to the appropriate area as directed 2 (Two) Times a Day., Disp: 45 g, Rfl: 5  •  naproxen (Naprosyn) 500 MG tablet, Take 1 tablet by mouth 2 (Two) Times a Day With Meals., Disp: 180 tablet, Rfl: 1  •  Omega-3 Fatty Acids (fish oil) 1000 MG capsule capsule, Take 1 capsule by mouth 2 (Two) Times a Day With Meals., Disp: 180 capsule, Rfl: 1  •  pantoprazole (PROTONIX) 40 MG EC tablet, Take 1 tablet by mouth Daily., Disp: 90 tablet, Rfl: 1  •  polyethylene glycol (MIRALAX) 17 GM/SCOOP powder, Take 17 g by mouth Daily. Mix with 8 oz liquid, Disp: 510 g, Rfl: 5  •  traZODone (DESYREL) 100 MG tablet, Take 1.5 tablets by mouth Every Night., Disp: 30 tablet, Rfl: 1  •  vitamin D (ERGOCALCIFEROL) 1.25 MG (44367 UT) capsule  "capsule, Take 1 capsule by mouth Every 7 (Seven) Days., Disp: 5 capsule, Rfl: 2  •  ziprasidone (Geodon) 60 MG capsule, Take 1 capsule by mouth 2 (Two) Times a Day With Meals., Disp: 60 capsule, Rfl: 1    Allergies:   Statins, Zofran [ondansetron], and Zoloft [sertraline hcl]    Vitals:  Ht 177.8 cm (70\")   Wt 114 kg (252 lb 6.4 oz)   BMI 36.22 kg/m²     Physical Exam:  Physical Exam  Constitutional:       Appearance: He is obese.   HENT:      Head: Normocephalic and atraumatic.      Nose: Nose normal. No congestion or rhinorrhea.   Eyes:      General: No scleral icterus.     Extraocular Movements: Extraocular movements intact.      Conjunctiva/sclera: Conjunctivae normal.      Pupils: Pupils are equal, round, and reactive to light.   Cardiovascular:      Rate and Rhythm: Normal rate and regular rhythm.      Pulses: Normal pulses.      Heart sounds: Normal heart sounds.   Pulmonary:      Effort: Pulmonary effort is normal.      Breath sounds: Normal breath sounds.   Abdominal:      General: Abdomen is flat. Bowel sounds are normal. There is no distension.      Palpations: Abdomen is soft. There is no shifting dullness, fluid wave, hepatomegaly, splenomegaly, mass or pulsatile mass.      Tenderness: There is no abdominal tenderness. There is no guarding or rebound.      Hernia: No hernia is present.   Musculoskeletal:         General: No swelling or tenderness.      Cervical back: Normal range of motion and neck supple.   Skin:     General: Skin is warm and dry.      Coloration: Skin is not jaundiced.   Neurological:      General: No focal deficit present.      Mental Status: He is alert and oriented to person, place, and time.   Psychiatric:         Mood and Affect: Mood normal.         Behavior: Behavior normal.         Results Review:  Lab Results:   No visits with results within 1 Month(s) from this visit.   Latest known visit with results is:   Hospital Outpatient Visit on 08/27/2021   Component Date Value Ref " Range Status   • QT Interval 08/27/2021 374  ms Final   • QTC Interval 08/27/2021 426  ms Final       Assessment/Plan     Visit Diagnoses:    ICD-10-CM ICD-9-CM   1. Chronic idiopathic constipation  K59.04 564.00       Plan:  I have discussed diet changes with the patient.  He will add prunes and kiwi to his diet.  He may mix his prunes with old-fashioned oatmeal daily.  He will continue the Linzess.  He would like to postpone reattempt at colonoscopy for the first of the year.  He will follow-up in 8 weeks.    * Surgery not found *      MEDS ORDERED DURING VISIT:  No orders of the defined types were placed in this encounter.      Return in about 8 weeks (around 12/22/2021).             This document has been electronically signed by Judy Grant MD   October 27, 2021 14:04 EDT      Part of this note may be an electronic transcription/translation of spoken language to printed text using the Dragon Dictation System.

## 2021-11-17 ENCOUNTER — OFFICE VISIT (OUTPATIENT)
Dept: FAMILY MEDICINE CLINIC | Facility: CLINIC | Age: 46
End: 2021-11-17

## 2021-11-17 VITALS
TEMPERATURE: 98 F | OXYGEN SATURATION: 98 % | WEIGHT: 250.4 LBS | HEART RATE: 97 BPM | HEIGHT: 70 IN | BODY MASS INDEX: 35.85 KG/M2 | SYSTOLIC BLOOD PRESSURE: 140 MMHG | DIASTOLIC BLOOD PRESSURE: 100 MMHG

## 2021-11-17 DIAGNOSIS — Z79.4 TYPE 2 DIABETES MELLITUS WITH HYPERGLYCEMIA, WITH LONG-TERM CURRENT USE OF INSULIN (HCC): ICD-10-CM

## 2021-11-17 DIAGNOSIS — R53.83 FATIGUE, UNSPECIFIED TYPE: ICD-10-CM

## 2021-11-17 DIAGNOSIS — I10 ESSENTIAL HYPERTENSION: ICD-10-CM

## 2021-11-17 DIAGNOSIS — E11.65 TYPE 2 DIABETES MELLITUS WITH HYPERGLYCEMIA, WITH LONG-TERM CURRENT USE OF INSULIN (HCC): ICD-10-CM

## 2021-11-17 DIAGNOSIS — E78.5 HYPERLIPIDEMIA, UNSPECIFIED HYPERLIPIDEMIA TYPE: Primary | ICD-10-CM

## 2021-11-17 DIAGNOSIS — I80.9 PHLEBITIS: ICD-10-CM

## 2021-11-17 PROCEDURE — 99214 OFFICE O/P EST MOD 30 MIN: CPT | Performed by: NURSE PRACTITIONER

## 2021-11-17 PROCEDURE — 90686 IIV4 VACC NO PRSV 0.5 ML IM: CPT | Performed by: NURSE PRACTITIONER

## 2021-11-17 PROCEDURE — 90471 IMMUNIZATION ADMIN: CPT | Performed by: NURSE PRACTITIONER

## 2021-11-17 RX ORDER — CEPHALEXIN 500 MG/1
500 CAPSULE ORAL 3 TIMES DAILY
Qty: 30 CAPSULE | Refills: 0 | Status: SHIPPED | OUTPATIENT
Start: 2021-11-17 | End: 2022-02-22

## 2021-11-17 RX ORDER — LISINOPRIL 10 MG/1
10 TABLET ORAL DAILY
Qty: 30 TABLET | Refills: 5 | Status: SHIPPED | OUTPATIENT
Start: 2021-11-17 | End: 2022-05-25 | Stop reason: SDUPTHER

## 2021-11-17 RX ORDER — ERGOCALCIFEROL 1.25 MG/1
50000 CAPSULE ORAL
Qty: 5 CAPSULE | Refills: 2 | Status: SHIPPED | OUTPATIENT
Start: 2021-11-17 | End: 2022-05-25

## 2021-11-17 RX ORDER — FENOFIBRATE 145 MG/1
145 TABLET, COATED ORAL DAILY
Qty: 90 TABLET | Refills: 1 | Status: SHIPPED | OUTPATIENT
Start: 2021-11-17 | End: 2022-05-25 | Stop reason: SDUPTHER

## 2021-11-24 ENCOUNTER — TELEPHONE (OUTPATIENT)
Dept: FAMILY MEDICINE CLINIC | Facility: CLINIC | Age: 46
End: 2021-11-24

## 2021-11-29 NOTE — PROGRESS NOTES
Subjective   Thomas Mireles is a 46 y.o. male.   Chief Compliant: The patient presents with Cyst (lower right leg), Anxiety, and Follow-up (labs needed?  check testosterone?)    Diabetes  He presents for his follow-up diabetic visit. He has type 2 diabetes mellitus. No MedicAlert identification noted. The initial diagnosis of diabetes was made 9 years ago. His disease course has been improving. Hypoglycemia symptoms include mood changes. Pertinent negatives for hypoglycemia include no hunger, pallor, sleepiness, speech difficulty, sweats or tremors. There are no diabetic associated symptoms. Pertinent negatives for diabetes include no foot paresthesias, no foot ulcerations, no polydipsia, no polyphagia and no polyuria. There are no hypoglycemic complications. Pertinent negatives for hypoglycemia complications include no blackouts, no hospitalization, no nocturnal hypoglycemia, no required assistance and no required glucagon injection. Symptoms are stable. Pertinent negatives for diabetic complications include no autonomic neuropathy, CVA, heart disease, nephropathy, peripheral neuropathy, PVD or retinopathy. Risk factors for coronary artery disease include obesity and stress. Current diabetic treatment includes insulin injections. He is compliant with treatment most of the time. He is currently taking insulin pre-breakfast. Insulin injections are given by patient. Rotation sites for injection include the abdominal wall. His weight is stable. He is following a diabetic diet. When asked about meal planning, he reported none. He has not had a previous visit with a dietitian. He participates in exercise daily. He monitors blood glucose at home 1-2 x per day. He monitors urine at home <1 x per month. Blood glucose monitoring compliance is adequate. There is no change in his home blood glucose trend. His breakfast blood glucose is taken between 7-8 am. His breakfast blood glucose range is generally 130-140 mg/dl. His  lunch blood glucose is taken between 11-12 pm. His lunch blood glucose range is generally 110-130 mg/dl. His dinner blood glucose is taken between 5-6 pm. His dinner blood glucose range is generally 110-130 mg/dl. His overall blood glucose range is 140-180 mg/dl. An ACE inhibitor/angiotensin II receptor blocker is not being taken. He does not see a podiatrist.Eye exam is current.   Hypertension  This is a chronic problem. The current episode started more than 1 year ago. The problem is uncontrolled. Pertinent negatives include no malaise/fatigue, orthopnea, peripheral edema, PND or sweats. Risk factors for coronary artery disease include family history, obesity and smoking/tobacco exposure. Current antihypertension treatment includes ACE inhibitors. The current treatment provides moderate improvement. Compliance problems include exercise, psychosocial issues, medication side effects, medication cost and diet.  There is no history of CVA, PVD or retinopathy.   Cyst  This is a new problem. The current episode started 1 to 4 weeks ago. The problem occurs daily. The problem has been unchanged. Associated symptoms comments: Pain and nodule noted left calf  . Nothing aggravates the symptoms. He has tried nothing for the symptoms.   Hyperlipidemia  This is a chronic problem. The current episode started more than 1 year ago. Recent lipid tests were reviewed and are variable. Exacerbating diseases include diabetes and obesity. Factors aggravating his hyperlipidemia include fatty foods. Current antihyperlipidemic treatment includes diet change. The current treatment provides moderate improvement of lipids. Compliance problems include adherence to exercise and adherence to diet.  Risk factors for coronary artery disease include family history, dyslipidemia, hypertension, male sex and obesity.      The following portions of the patient's history were reviewed and updated as appropriate: allergies, current medications, past  "family history, past medical history, past social history, past surgical history and problem list.      Review of Systems   Constitutional: Negative for activity change and malaise/fatigue.   Respiratory: Negative.    Cardiovascular: Negative.  Negative for orthopnea and PND.   Endocrine: Negative for polydipsia, polyphagia and polyuria.   Skin: Negative for pallor.   Neurological: Negative for tremors, syncope, facial asymmetry, speech difficulty and light-headedness.   Psychiatric/Behavioral: Negative for agitation, behavioral problems, self-injury and sleep disturbance.   All other systems reviewed and are negative.      Procedures    Vitals: Blood pressure 140/100, pulse 97, temperature 98 °F (36.7 °C), temperature source Temporal, height 177.8 cm (70\"), weight 114 kg (250 lb 6.4 oz), SpO2 98 %.     Allergies:   Allergies   Allergen Reactions   • Statins Other (See Comments)     All over pain,    • Zofran [Ondansetron] Nausea And Vomiting   • Zoloft [Sertraline Hcl] Nausea And Vomiting          Objective   Physical Exam   Constitutional: He is oriented to person, place, and time. He appears well-developed. No distress.   HENT:   Head: Normocephalic and atraumatic.   Right Ear: Hearing, tympanic membrane, external ear and ear canal normal.   Left Ear: Hearing, tympanic membrane, external ear and ear canal normal.   Nose: Nose normal. No mucosal edema or rhinorrhea. Right sinus exhibits no maxillary sinus tenderness and no frontal sinus tenderness. Left sinus exhibits no maxillary sinus tenderness and no frontal sinus tenderness.   Mouth/Throat: Uvula is midline. No oropharyngeal exudate. Tonsils are 0 on the right. Tonsils are 0 on the left. No tonsillar exudate.   Eyes: Pupils are equal, round, and reactive to light. Conjunctivae are normal. Right eye exhibits no discharge. Left eye exhibits no discharge.   Neck: No thyromegaly present.   Cardiovascular: Normal rate, regular rhythm and normal heart sounds.   No " murmur heard.  Pulmonary/Chest: Effort normal and breath sounds normal.   Musculoskeletal:      Right hip: Normal.      Left hip: Normal.      Right knee: Normal.      Cervical back: Normal.   Lymphadenopathy:     He has no cervical adenopathy.   Neurological: He is alert and oriented to person, place, and time. He has normal reflexes. He displays normal reflexes. No cranial nerve deficit or sensory deficit. He exhibits normal muscle tone. Coordination normal.   Skin: Skin is warm and dry. He is not diaphoretic. There is erythema.          Psychiatric: His behavior is normal. Judgment and thought content normal.   Nursing note and vitals reviewed.      Assessment/Plan   Discussed with patient impression and plan, patient verbalizes understanding.        During this visit the following were done:  Labs Reviewed [x]    Labs Ordered []    Radiology Reports Reviewed []    Radiology Ordered []    PCP Records Reviewed []    Referring Provider Records Reviewed []    ER Records Reviewed []    Hospital Records Reviewed []    History Obtained From Family []    Radiology Images Reviewed []    Other Reviewed []    Records Requested [x]      Diagnoses and all orders for this visit:    1. Type 2 diabetes mellitus with hyperglycemia, with long-term current use of insulin (CMS/[facility]) (Primary)  Continue with current medications    2. Arthralgia, unspecified joint  Continue with improved activity    3. Chronic daily headache  Continue with current meds    4. Bipolar II disorder (CMS/[facility])  Continue with psych    5. Essential hypertension  Continue with current medications    6. Class 2 severe obesity due to excess calories with serious comorbidity and body mass index (BMI) of 38.0 to 38.9 in adult (CMS/[facility])  Continue with current meds          Patient is a non smoker    Patient's Body mass index is 35.93 kg/m². BMI is above normal parameters. Recommendations include: exercise counseling and nutrition counseling.               Physical  Exam  Vitals and nursing note reviewed.   Constitutional:       General: He is not in acute distress.     Appearance: He is well-developed. He is not diaphoretic.   HENT:      Head: Normocephalic and atraumatic.      Right Ear: Hearing, tympanic membrane, ear canal and external ear normal.      Left Ear: Hearing, tympanic membrane, ear canal and external ear normal.      Nose: Nose normal. No mucosal edema or rhinorrhea.      Right Sinus: No maxillary sinus tenderness or frontal sinus tenderness.      Left Sinus: No maxillary sinus tenderness or frontal sinus tenderness.      Mouth/Throat:      Pharynx: Uvula midline. No oropharyngeal exudate.      Tonsils: No tonsillar exudate. 0 on the right. 0 on the left.   Eyes:      General:         Right eye: No discharge.         Left eye: No discharge.      Conjunctiva/sclera: Conjunctivae normal.      Pupils: Pupils are equal, round, and reactive to light.   Neck:      Thyroid: No thyromegaly.   Cardiovascular:      Rate and Rhythm: Normal rate and regular rhythm.      Heart sounds: Normal heart sounds. No murmur heard.      Pulmonary:      Effort: Pulmonary effort is normal.      Breath sounds: Normal breath sounds.   Musculoskeletal:      Cervical back: Neck supple. Normal.      Right hip: Normal.      Left hip: Normal.      Right knee: Normal.   Lymphadenopathy:      Cervical: No cervical adenopathy.   Skin:     General: Skin is warm and dry.      Findings: Erythema present.      Comments:        Neurological:      Mental Status: He is alert and oriented to person, place, and time.      Cranial Nerves: No cranial nerve deficit.      Sensory: No sensory deficit.      Motor: No abnormal muscle tone.      Coordination: Coordination normal.      Deep Tendon Reflexes: Reflexes are normal and symmetric. Reflexes normal.   Psychiatric:         Behavior: Behavior normal.         Thought Content: Thought content normal.         Judgment: Judgment normal.            Diagnoses and  all orders for this visit:    1. Hyperlipidemia, unspecified hyperlipidemia type (Primary)  -     fenofibrate (TRICOR) 145 MG tablet; Take 1 tablet by mouth Daily.  Dispense: 90 tablet; Refill: 1    2. Essential hypertension  -     metoprolol tartrate (LOPRESSOR) 25 MG tablet; Take 1 tablet by mouth Every 12 (Twelve) Hours.  Dispense: 180 tablet; Refill: 1  -     Comprehensive Metabolic Panel; Future  -     Lipid Panel; Future    3. Type 2 diabetes mellitus with hyperglycemia, with long-term current use of insulin (HCC)  -     vitamin D (ERGOCALCIFEROL) 1.25 MG (60745 UT) capsule capsule; Take 1 capsule by mouth Every 7 (Seven) Days.  Dispense: 5 capsule; Refill: 2  -     CBC Auto Differential; Future  -     Comprehensive Metabolic Panel; Future  -     Hemoglobin A1c; Future  -     TSH; Future  -     T4, Free; Future  -     Lipid Panel; Future  -     Testosterone, Free, Total; Future  -     lisinopril (PRINIVIL,ZESTRIL) 10 MG tablet; Take 1 tablet by mouth Daily.  Dispense: 30 tablet; Refill: 5    4. Fatigue, unspecified type  -     CBC Auto Differential; Future  -     TSH; Future  -     Testosterone, Free, Total; Future    5. Phlebitis  -     cephalexin (Keflex) 500 MG capsule; Take 1 capsule by mouth 3 (Three) Times a Day.  Dispense: 30 capsule; Refill: 0    Other orders  -     FluLaval/Fluarix/Fluzone >6 Months ([id number])

## 2021-12-13 ENCOUNTER — IMMUNIZATION (OUTPATIENT)
Dept: FAMILY MEDICINE CLINIC | Facility: CLINIC | Age: 46
End: 2021-12-13

## 2021-12-13 ENCOUNTER — LAB (OUTPATIENT)
Dept: FAMILY MEDICINE CLINIC | Facility: CLINIC | Age: 46
End: 2021-12-13

## 2021-12-13 DIAGNOSIS — E11.65 TYPE 2 DIABETES MELLITUS WITH HYPERGLYCEMIA, UNSPECIFIED WHETHER LONG TERM INSULIN USE (HCC): Primary | ICD-10-CM

## 2021-12-13 PROCEDURE — 91300 COVID-19 (PFIZER): CPT | Performed by: FAMILY MEDICINE

## 2021-12-13 PROCEDURE — 0003A COVID-19 (PFIZER): CPT | Performed by: FAMILY MEDICINE

## 2021-12-14 ENCOUNTER — TELEPHONE (OUTPATIENT)
Dept: FAMILY MEDICINE CLINIC | Facility: CLINIC | Age: 46
End: 2021-12-14

## 2021-12-14 LAB
ALBUMIN SERPL-MCNC: 4.6 G/DL (ref 3.5–5.2)
ALBUMIN/GLOB SERPL: 1.8 G/DL
ALP SERPL-CCNC: 63 U/L (ref 39–117)
ALT SERPL-CCNC: 20 U/L (ref 1–41)
AST SERPL-CCNC: 19 U/L (ref 1–40)
BASOPHILS # BLD AUTO: 0.04 10*3/MM3 (ref 0–0.2)
BASOPHILS NFR BLD AUTO: 0.7 % (ref 0–1.5)
BILIRUB SERPL-MCNC: 0.8 MG/DL (ref 0–1.2)
BUN SERPL-MCNC: 20 MG/DL (ref 6–20)
BUN/CREAT SERPL: 15.5 (ref 7–25)
CALCIUM SERPL-MCNC: 9.5 MG/DL (ref 8.6–10.5)
CHLORIDE SERPL-SCNC: 103 MMOL/L (ref 98–107)
CHOLEST SERPL-MCNC: 164 MG/DL (ref 0–200)
CO2 SERPL-SCNC: 27.1 MMOL/L (ref 22–29)
CREAT SERPL-MCNC: 1.29 MG/DL (ref 0.76–1.27)
EOSINOPHIL # BLD AUTO: 0.2 10*3/MM3 (ref 0–0.4)
EOSINOPHIL NFR BLD AUTO: 3.7 % (ref 0.3–6.2)
ERYTHROCYTE [DISTWIDTH] IN BLOOD BY AUTOMATED COUNT: 13.1 % (ref 12.3–15.4)
GLOBULIN SER CALC-MCNC: 2.6 GM/DL
GLUCOSE SERPL-MCNC: 112 MG/DL (ref 65–99)
HBA1C MFR BLD: 5.3 % (ref 4.8–5.6)
HCT VFR BLD AUTO: 44.8 % (ref 37.5–51)
HDLC SERPL-MCNC: 38 MG/DL (ref 40–60)
HGB BLD-MCNC: 14.9 G/DL (ref 13–17.7)
IMM GRANULOCYTES # BLD AUTO: 0.01 10*3/MM3 (ref 0–0.05)
IMM GRANULOCYTES NFR BLD AUTO: 0.2 % (ref 0–0.5)
IMP & REVIEW OF LAB RESULTS: NORMAL
LDLC SERPL CALC-MCNC: 106 MG/DL (ref 0–100)
LYMPHOCYTES # BLD AUTO: 1.23 10*3/MM3 (ref 0.7–3.1)
LYMPHOCYTES NFR BLD AUTO: 22.7 % (ref 19.6–45.3)
MCH RBC QN AUTO: 29.9 PG (ref 26.6–33)
MCHC RBC AUTO-ENTMCNC: 33.3 G/DL (ref 31.5–35.7)
MCV RBC AUTO: 90 FL (ref 79–97)
MONOCYTES # BLD AUTO: 0.6 10*3/MM3 (ref 0.1–0.9)
MONOCYTES NFR BLD AUTO: 11.1 % (ref 5–12)
NEUTROPHILS # BLD AUTO: 3.34 10*3/MM3 (ref 1.7–7)
NEUTROPHILS NFR BLD AUTO: 61.6 % (ref 42.7–76)
NRBC BLD AUTO-RTO: 0 /100 WBC (ref 0–0.2)
PLATELET # BLD AUTO: 245 10*3/MM3 (ref 140–450)
POTASSIUM SERPL-SCNC: 4.2 MMOL/L (ref 3.5–5.2)
PROT SERPL-MCNC: 7.2 G/DL (ref 6–8.5)
RBC # BLD AUTO: 4.98 10*6/MM3 (ref 4.14–5.8)
SODIUM SERPL-SCNC: 139 MMOL/L (ref 136–145)
T4 FREE SERPL-MCNC: 1.43 NG/DL (ref 0.93–1.7)
TESTOST FREE SERPL-MCNC: 22.8 PG/ML (ref 6.8–21.5)
TESTOST SERPL-MCNC: 302 NG/DL (ref 264–916)
TRIGL SERPL-MCNC: 106 MG/DL (ref 0–150)
TSH SERPL DL<=0.005 MIU/L-ACNC: 1.43 UIU/ML (ref 0.27–4.2)
VLDLC SERPL CALC-MCNC: 20 MG/DL (ref 5–40)
WBC # BLD AUTO: 5.42 10*3/MM3 (ref 3.4–10.8)

## 2021-12-14 NOTE — TELEPHONE ENCOUNTER
----- Message from LAUREN Majano sent at 12/14/2021 12:40 PM EST -----  Labs are ok sugar has improved.  Kidney are still showing a decline stable needs to be sure he is drinking plenty of water    Left a message to return call.    Spoke with patient & her verbalized understanding.

## 2021-12-21 DIAGNOSIS — R10.13 DYSPEPSIA: ICD-10-CM

## 2021-12-21 RX ORDER — PANTOPRAZOLE SODIUM 40 MG/1
40 TABLET, DELAYED RELEASE ORAL DAILY
Qty: 90 TABLET | Refills: 1 | Status: SHIPPED | OUTPATIENT
Start: 2021-12-21 | End: 2022-02-22

## 2021-12-27 DIAGNOSIS — E11.65 TYPE 2 DIABETES MELLITUS WITH HYPERGLYCEMIA, WITH LONG-TERM CURRENT USE OF INSULIN (HCC): ICD-10-CM

## 2021-12-27 DIAGNOSIS — Z79.4 TYPE 2 DIABETES MELLITUS WITH HYPERGLYCEMIA, WITH LONG-TERM CURRENT USE OF INSULIN (HCC): ICD-10-CM

## 2021-12-27 RX ORDER — DULAGLUTIDE 1.5 MG/.5ML
1.5 INJECTION, SOLUTION SUBCUTANEOUS WEEKLY
Qty: 9 ML | Refills: 1 | Status: SHIPPED | OUTPATIENT
Start: 2021-12-27 | End: 2022-05-25 | Stop reason: SDUPTHER

## 2021-12-29 ENCOUNTER — OFFICE VISIT (OUTPATIENT)
Dept: GASTROENTEROLOGY | Facility: CLINIC | Age: 46
End: 2021-12-29

## 2021-12-29 VITALS
HEART RATE: 96 BPM | SYSTOLIC BLOOD PRESSURE: 134 MMHG | WEIGHT: 252.6 LBS | DIASTOLIC BLOOD PRESSURE: 87 MMHG | HEIGHT: 70 IN | BODY MASS INDEX: 36.16 KG/M2

## 2021-12-29 DIAGNOSIS — K59.04 CHRONIC IDIOPATHIC CONSTIPATION: Primary | ICD-10-CM

## 2021-12-29 PROCEDURE — 99213 OFFICE O/P EST LOW 20 MIN: CPT | Performed by: PHYSICIAN ASSISTANT

## 2021-12-29 RX ORDER — PLECANATIDE 3 MG/1
3 TABLET ORAL DAILY
Qty: 90 TABLET | Refills: 3 | Status: SHIPPED | OUTPATIENT
Start: 2021-12-29 | End: 2022-05-25

## 2022-01-20 RX ORDER — ASPIRIN 81 MG/1
81 TABLET ORAL DAILY
Qty: 90 TABLET | Refills: 1 | Status: SHIPPED | OUTPATIENT
Start: 2022-01-20 | End: 2022-07-18

## 2022-01-21 ENCOUNTER — TELEPHONE (OUTPATIENT)
Dept: FAMILY MEDICINE CLINIC | Facility: CLINIC | Age: 47
End: 2022-01-21

## 2022-01-21 NOTE — TELEPHONE ENCOUNTER
Trulicity 1.5mg/0.5mL pen-injectors is not covered under his insurance. Do you want to change or PA?

## 2022-01-25 NOTE — TELEPHONE ENCOUNTER
Medication approved with [facility] [employer] per CoverMyMeds.  Called patient to confirm and he states he now has medication and it was approved.

## 2022-02-22 ENCOUNTER — OFFICE VISIT (OUTPATIENT)
Dept: FAMILY MEDICINE CLINIC | Facility: CLINIC | Age: 47
End: 2022-02-22

## 2022-02-22 VITALS
TEMPERATURE: 97.8 F | HEART RATE: 81 BPM | HEIGHT: 70 IN | WEIGHT: 259.2 LBS | DIASTOLIC BLOOD PRESSURE: 70 MMHG | SYSTOLIC BLOOD PRESSURE: 120 MMHG | OXYGEN SATURATION: 99 % | BODY MASS INDEX: 37.11 KG/M2

## 2022-02-22 DIAGNOSIS — E66.01 MORBID (SEVERE) OBESITY DUE TO EXCESS CALORIES: ICD-10-CM

## 2022-02-22 DIAGNOSIS — L30.9 DERMATITIS: Primary | ICD-10-CM

## 2022-02-22 DIAGNOSIS — E11.65 TYPE 2 DIABETES MELLITUS WITH HYPERGLYCEMIA, WITH LONG-TERM CURRENT USE OF INSULIN: ICD-10-CM

## 2022-02-22 DIAGNOSIS — Z79.4 TYPE 2 DIABETES MELLITUS WITH HYPERGLYCEMIA, WITH LONG-TERM CURRENT USE OF INSULIN: ICD-10-CM

## 2022-02-22 PROCEDURE — 99213 OFFICE O/P EST LOW 20 MIN: CPT | Performed by: NURSE PRACTITIONER

## 2022-02-22 RX ORDER — TRIAMCINOLONE ACETONIDE 1 MG/G
1 CREAM TOPICAL 2 TIMES DAILY
Qty: 80 G | Refills: 2 | Status: SHIPPED | OUTPATIENT
Start: 2022-02-22

## 2022-02-22 NOTE — PROGRESS NOTES
Chief Complaint  Rash (legs and feet)    Subjective          Thomas Mireles presents to [facility]  Rash  This is a new problem. The current episode started more than 1 month ago. The problem has been gradually worsening since onset. Location: bilateral lower extremity. The rash is characterized by itchiness, peeling, redness and scaling. He was exposed to nothing. Pertinent negatives include no fatigue. Past treatments include moisturizer. The treatment provided no relief.   Diabetes  He has type 2 diabetes mellitus. No MedicAlert identification noted. The initial diagnosis of diabetes was made 10 years ago. His disease course has been stable. Pertinent negatives for hypoglycemia include no confusion, dizziness, headaches, hunger, mood changes, nervousness/anxiousness, pallor, seizures, sleepiness, speech difficulty, sweats or tremors. Pertinent negatives for diabetes include no blurred vision, no chest pain, no fatigue, no foot paresthesias, no foot ulcerations, no polydipsia, no polyphagia, no polyuria, no visual change, no weakness and no weight loss. Pertinent negatives for hypoglycemia complications include no blackouts, no hospitalization, no nocturnal hypoglycemia, no required assistance and no required glucagon injection. Symptoms are stable. Diabetic complications include impotence. Pertinent negatives for diabetic complications include no CVA, heart disease, nephropathy, peripheral neuropathy, PVD or retinopathy. Risk factors for coronary artery disease include family history. Current diabetic treatment includes diet and insulin injections. He is compliant with treatment all of the time. He is currently taking insulin at bedtime. Insulin injections are given by patient. Rotation sites for injection include the abdominal wall. His weight is stable. He is following a diabetic and generally healthy diet. When asked about meal planning, he reported none. He has not had a  "previous visit with a dietitian. He participates in exercise daily. He monitors blood glucose at home 1-2 x per week. He monitors urine at home <1 x per month. Blood glucose monitoring compliance is adequate. There is no change in his home blood glucose trend. His breakfast blood glucose is taken after 10 am. His breakfast blood glucose range is generally 110-130 mg/dl. His lunch blood glucose is taken after 3 pm. His lunch blood glucose range is generally 110-130 mg/dl. His dinner blood glucose is taken between 6-7 pm. His dinner blood glucose range is generally 110-130 mg/dl. His highest blood glucose is 110-130 mg/dl. His overall blood glucose range is 110-130 mg/dl. He does not see a podiatrist.Eye exam is current.       Objective   Vital Signs:   /70 (BP Location: Right arm, Patient Position: Sitting)   Pulse 81   Temp 97.8 °F (36.6 °C) (Temporal)   Ht 177.8 cm (70\")   Wt 118 kg (259 lb 3.2 oz)   SpO2 99%   BMI 37.19 kg/m²     Physical Exam  Vitals and nursing note reviewed.   Constitutional:       Appearance: He is well-developed.   Cardiovascular:      Rate and Rhythm: Normal rate and regular rhythm.      Heart sounds: Normal heart sounds. No murmur heard.      Pulmonary:      Effort: Pulmonary effort is normal.      Breath sounds: Normal breath sounds.   Skin:     General: Skin is warm and dry.   Neurological:      Mental Status: He is alert and oriented to person, place, and time.   Psychiatric:         Behavior: Behavior normal.        Result Review :                 Assessment and Plan    Diagnoses and all orders for this visit:    1. Dermatitis (Primary)  -     triamcinolone (KENALOG) 0.1 % cream; Apply 1 application topically to the appropriate area as directed 2 (Two) Times a Day.  Dispense: 80 g; Refill: 2  Mix triamcinolone with Eucerin or Cetaphil    2. Type 2 diabetes mellitus with hyperglycemia, with long-term current use of insulin (HCC)  Continue with current management    3. Morbid " (severe) obesity due to excess calories (HCC)    Patient's Body mass index is 37.19 kg/m². indicating that he is obese (BMI >30). Obesity-related health conditions include the following: hypertension, diabetes mellitus and dyslipidemias. Obesity is worsening. BMI is is above average; BMI management plan is completed. We discussed portion control and increasing exercise..      Follow Up   Return in about 3 months (around 5/22/2022), or if symptoms worsen or fail to improve, for Recheck.  Patient was given instructions and counseling regarding his condition or for health maintenance advice. Please see specific information pulled into the AVS if appropriate.

## 2022-03-10 DIAGNOSIS — R51.9 CHRONIC DAILY HEADACHE: ICD-10-CM

## 2022-03-10 DIAGNOSIS — M25.50 ARTHRALGIA, UNSPECIFIED JOINT: ICD-10-CM

## 2022-03-10 RX ORDER — CYCLOBENZAPRINE HCL 5 MG
5 TABLET ORAL NIGHTLY PRN
Qty: 90 TABLET | Refills: 1 | Status: SHIPPED | OUTPATIENT
Start: 2022-03-10 | End: 2022-08-07 | Stop reason: SDUPTHER

## 2022-03-17 DIAGNOSIS — F31.81 BIPOLAR II DISORDER: ICD-10-CM

## 2022-03-17 DIAGNOSIS — F51.05 INSOMNIA DUE TO MENTAL DISORDER: ICD-10-CM

## 2022-03-17 RX ORDER — TRAZODONE HYDROCHLORIDE 100 MG/1
TABLET ORAL
Qty: 30 TABLET | Refills: 1 | OUTPATIENT
Start: 2022-03-17

## 2022-03-20 DIAGNOSIS — Z79.4 TYPE 2 DIABETES MELLITUS WITH HYPERGLYCEMIA, WITH LONG-TERM CURRENT USE OF INSULIN: ICD-10-CM

## 2022-03-20 DIAGNOSIS — E11.65 TYPE 2 DIABETES MELLITUS WITH HYPERGLYCEMIA, WITH LONG-TERM CURRENT USE OF INSULIN: ICD-10-CM

## 2022-03-21 RX ORDER — LANCETS
EACH MISCELLANEOUS
Qty: 100 EACH | Refills: 1 | Status: SHIPPED | OUTPATIENT
Start: 2022-03-21

## 2022-04-01 ENCOUNTER — OFFICE VISIT (OUTPATIENT)
Dept: PSYCHIATRY | Facility: CLINIC | Age: 47
End: 2022-04-01

## 2022-04-01 VITALS — DIASTOLIC BLOOD PRESSURE: 82 MMHG | HEART RATE: 84 BPM | SYSTOLIC BLOOD PRESSURE: 126 MMHG

## 2022-04-01 DIAGNOSIS — Z79.899 MEDICATION MANAGEMENT: ICD-10-CM

## 2022-04-01 DIAGNOSIS — F31.81 BIPOLAR II DISORDER: Primary | ICD-10-CM

## 2022-04-01 DIAGNOSIS — F51.05 INSOMNIA DUE TO MENTAL DISORDER: ICD-10-CM

## 2022-04-01 PROCEDURE — 99214 OFFICE O/P EST MOD 30 MIN: CPT | Performed by: NURSE PRACTITIONER

## 2022-04-01 RX ORDER — ZIPRASIDONE HYDROCHLORIDE 60 MG/1
60 CAPSULE ORAL 2 TIMES DAILY WITH MEALS
Qty: 60 CAPSULE | Refills: 1 | Status: SHIPPED | OUTPATIENT
Start: 2022-04-01 | End: 2022-06-01 | Stop reason: SDUPTHER

## 2022-04-01 RX ORDER — TRAZODONE HYDROCHLORIDE 100 MG/1
150 TABLET ORAL NIGHTLY
Qty: 30 TABLET | Refills: 1 | Status: SHIPPED | OUTPATIENT
Start: 2022-04-01 | End: 2022-06-01 | Stop reason: SDUPTHER

## 2022-04-01 RX ORDER — HYDROXYZINE HYDROCHLORIDE 10 MG/1
10-20 TABLET, FILM COATED ORAL 3 TIMES DAILY PRN
Qty: 60 TABLET | Refills: 0 | Status: SHIPPED | OUTPATIENT
Start: 2022-04-01 | End: 2022-05-02 | Stop reason: SDUPTHER

## 2022-04-01 RX ORDER — PANTOPRAZOLE SODIUM 40 MG/1
TABLET, DELAYED RELEASE ORAL
COMMUNITY
Start: 2022-03-20 | End: 2022-10-21 | Stop reason: SDUPTHER

## 2022-04-01 NOTE — PROGRESS NOTES
"Subjective   Thomas Mireles is a 46 y.o. male who is here today for in his follow-up appointment.  Patient last evaluated 2021.    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 He reports he did not run out of his Geodon and Trazodone and continues to take them as prescribed.  . Depression rated as 3/10 and anxiety as 4/10. Patient  Denies SI/HI/AVH. Sleep is \"ok\". Sometimes he  finds it hard to wind down after work and does not sleep at all. States he makes up for loss of sleep  on his days off. Some days he is tired when he does not sleep and sometimes he is not. Appetite is good. States he finds it more difficult to adhere to diabetic diet due to his schedule.     The following portions of the patient's history were reviewed and updated as appropriate: allergies, current medications, past family history, past medical history, past social history, past surgical history and problem list.    Family History:  Family History   Problem Relation Age of Onset   • Hypertension Mother    • Hypertension Father    • Diabetes Maternal Grandfather    • Heart disease Maternal Grandfather    • Mental illness Maternal Grandfather         PTSD   • Liver cancer Paternal Grandfather    • Asthma Paternal Grandfather          in .   • Diabetes Paternal Grandfather      Past Surgical History:  Past Surgical History:   Procedure Laterality Date   • COLONOSCOPY N/A 2021    Procedure: COLONOSCOPY;  Surgeon: Judy Grant MD;  Location: [facility];  Service: Gastroenterology;  Laterality: N/A;   • KNEE SURGERY      right   • NASAL POLYP EXCISION     • URETHRAL DILATATION      infant     Problem List:  Patient Active Problem List   Diagnosis   • Hypertension   • Hyperlipidemia   • Type 2 diabetes mellitus with hyperglycemia (HCC)   • Bipolar II disorder (HCC)   • " Rosacea   • Abnormal EKG   • Old myocardial infarction by EKG criteria.   • Dyspnea on exertion (class 2-3)   • Head injury   • Chronic idiopathic constipation     Allergy:  Allergies   Allergen Reactions   • Statins Other (See Comments)     All over pain,    • Zofran [Ondansetron] Nausea And Vomiting   • Zoloft [Sertraline Hcl] Nausea And Vomiting     Current Medications:   Current Outpatient Medications   Medication Sig Dispense Refill   • Accu-Chek Softclix Lancets lancets USE TO CHECK BLOOD GLUCOSE EVERY  each 1   • aspirin 81 MG EC tablet Take 1 tablet by mouth Daily. 90 tablet 1   • cyclobenzaprine (FLEXERIL) 5 MG tablet TAKE 1 TABLET BY MOUTH AT NIGHT AS NEEDED FOR MUSCLE SPASMS 90 tablet 1   • Dulaglutide (Trulicity) 1.5 MG/0.5ML solution pen-injector Inject 1.5 mg under the skin into the appropriate area as directed 1 (One) Time Per Week. 9 mL 1   • fenofibrate (TRICOR) 145 MG tablet Take 1 tablet by mouth Daily. 90 tablet 1   • glucose blood test strip For daily testing  E11.65 100 each 11   • glucose blood test strip Use as instructed to check sugar once daily 100 each 12   • glucose monitor monitoring kit 1 each As Needed (use to check sugar once daily). 1 each 0   • hydrOXYzine (ATARAX) 10 MG tablet Take 1-2 tablets by mouth 3 (Three) Times a Day As Needed (insomnia). 60 tablet 0   • lisinopril (PRINIVIL,ZESTRIL) 10 MG tablet Take 1 tablet by mouth Daily. 30 tablet 5   • metoprolol tartrate (LOPRESSOR) 25 MG tablet Take 1 tablet by mouth Every 12 (Twelve) Hours. 180 tablet 1   • metroNIDAZOLE (METROGEL) 0.75 % gel Apply  topically to the appropriate area as directed 2 (Two) Times a Day. 45 g 5   • naproxen (Naprosyn) 500 MG tablet Take 1 tablet by mouth 2 (Two) Times a Day With Meals. 180 tablet 1   • Omega-3 Fatty Acids (fish oil) 1000 MG capsule capsule Take 1 capsule by mouth 2 (Two) Times a Day With Meals. 180 capsule 1   • Plecanatide (Trulance) 3 MG tablet Take 1 tablet by mouth Daily. 90  tablet 3   • traZODone (DESYREL) 100 MG tablet Take 1.5 tablets by mouth Every Night. 30 tablet 1   • triamcinolone (KENALOG) 0.1 % cream Apply 1 application topically to the appropriate area as directed 2 (Two) Times a Day. 80 g 2   • vitamin D (ERGOCALCIFEROL) 1.25 MG (67551 UT) capsule capsule Take 1 capsule by mouth Every 7 (Seven) Days. 5 capsule 2   • ziprasidone (Geodon) 60 MG capsule Take 1 capsule by mouth 2 (Two) Times a Day With Meals. 60 capsule 1     No current facility-administered medications for this visit.     Answers for HPI/ROS submitted by the patient on 3/25/2022  What is the primary reason for your visit?: Other  Please describe your symptoms.: Bipolar disorder  Have you had these symptoms before?: Yes  How long have you been having these symptoms?: Greater than 2 weeks  Please describe any probable cause for these symptoms. : Geodon, trazodone.    Review of Systems  Review of Systems   Constitutional: Positive for activity change, appetite change and fatigue.   Musculoskeletal: Positive for arthralgias.   Neurological: Positive for light-headedness, numbness and headache.   Psychiatric/Behavioral: Positive for agitation, decreased concentration, sleep disturbance, depressed mood and stress. Negative for hallucinations, self-injury and suicidal ideas. The patient is nervous/anxious.    All other systems reviewed and are negative.    Objective   Physical Exam  Vitals reviewed.   Constitutional:       Appearance: He is well-groomed. He is obese.   Neurological:      Mental Status: He is alert and oriented to person, place, and time.      Gait: Gait is intact.   Psychiatric:         Attention and Perception: Attention and perception normal.         Mood and Affect: Mood normal.         Speech: Speech normal.         Behavior: Behavior normal. Behavior is cooperative.         Thought Content: Thought content is not paranoid or delusional. Thought content does not include homicidal or suicidal  ideation. Thought content does not include suicidal plan.         Cognition and Memory: Cognition is impaired. Memory is impaired.         Judgment: Judgment is impulsive.       Blood pressure 126/82, pulse 84.    Appearance: Thomas is a 45 year old  male. He appears clean, appropriately dressed. BMI 37.56    Gait, Station, Strength: Posture upright, gait steady. No obvious signs of impairment or acute distress. No abnormal muscle movements, motor tics or tremors observed.     Mental Status Exam:   Hygiene:   good  Cooperation:  Cooperative  Eye Contact:  Good  Psychomotor Behavior:  Appropriate  Affect:  Appropriate  Hopelessness: Denies  Optimistic:minimally  Speech:  Normal  Thought Process:  Goal directed and Linear  Thought Content:  Normal  Suicidal:  None  Homicidal:  None  Hallucinations:  None  Delusion:  None  Memory:  Deficits  Orientation:  Person, Place, Time and Situation  Reliability:  fair  Insight:  Fair  Judgement:  Impaired  Impulse Control:  Impaired  Physical/Medical Issues:  DMII, HTN, Head injury, migraine,     Assessment/Plan   Diagnoses and all orders for this visit:    1. Bipolar II disorder (HCC) (Primary)  -     traZODone (DESYREL) 100 MG tablet; Take 1.5 tablets by mouth Every Night.  Dispense: 30 tablet; Refill: 1  -     ziprasidone (Geodon) 60 MG capsule; Take 1 capsule by mouth 2 (Two) Times a Day With Meals.  Dispense: 60 capsule; Refill: 1    2. Insomnia due to mental disorder  -     traZODone (DESYREL) 100 MG tablet; Take 1.5 tablets by mouth Every Night.  Dispense: 30 tablet; Refill: 1  -     hydrOXYzine (ATARAX) 10 MG tablet; Take 1-2 tablets by mouth 3 (Three) Times a Day As Needed (insomnia).  Dispense: 60 tablet; Refill: 0    3. Medication management    Patient states he takes medication as prescribed and denies medication side effects.  He finds Geodon and trazodone mostly effective insomnia and mood symptoms.  Continues to struggle intermittently with insomnia due  to work schedule.  States he takes hydroxyzine as needed and finds it effective.  Patient prefers to continue current medications and assess the need for changes at follow-up visit.     -Continue Geodon 60 mg twice daily with 500 calorie meal for Bipolar disorder symptoms    -Continue  hydroxyzine HCL 10 mg tab 10-20 mg three times daily as needed for anxiety, insomnia     -continue trazodone 150 mg -  insomnia, depression     -The benefits of a healthy diet and exercise were discussed with patient, especially the positive effects they have on mental health. Patient encouraged to consider lifestyle modification regarding  diet and exercise patterns to maximize results of mental health treatment.    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 Normal metabolizer of other substrates    -Thomas Mireles  reports that he quit smoking about 13 years ago. He started smoking about 28 years ago. He has a 30.00 pack-year smoking history. He has never used smokeless tobacco.       Visit Diagnoses:    ICD-10-CM ICD-9-CM   1. Bipolar II disorder (HCC)  F31.81 296.89   2. Insomnia due to mental disorder  F51.05 300.9     327.02   3. Medication management  Z79.899 V58.69     TREATMENT PLAN/GOALS:  Treatment and medication options discussed during today's visit. Patient acknowledged and verbally consents to proceed with mutually agreed upon treatment plan. Patient reminded of important role medication adherence and regular follow-up appointments play in symptom alleviation and long term prognosis. Encouraged to take medications as prescribed.    MEDICATION ISSUES:  Discussed medication treatment options and plan of prescribed medication. Potential risks, benefits, and side effects including but not limited to the following reviewed: Black Box warnings, worsening symptoms, SI, sedation, GI side effects, metabolic  alterations and blood pressure fluctuations. Patient is reminded to refrain from illicit substance use, including alcohol while taking medications. Also advised to refrain from activity requiring  alertness until sedative effects of medication are assessed. Patient  agrees to call [facility] with any worsening of symptoms or onset of intolerable side effects. Patient is agreeable to call 911 or go to the nearest ER should he  begin having SI/HI.     PROGNOSIS  Prognosis: Guarded- dependent on patient's adherence to medication treatment plan and follow up appointments  Functionality: Patient showing improvements in important areas of daily functioning.     Short-term goals: Patient will adhere to medication regimen and experience continued improvement in symptoms over the next 3 months.     Long-term goals: Patient will adhere to medication treatment plan and report improvement in symptoms over the next 6 months    MEDS ORDERED DURING VISIT:  New Medications Ordered This Visit   Medications   • traZODone (DESYREL) 100 MG tablet     Sig: Take 1.5 tablets by mouth Every Night.     Dispense:  30 tablet     Refill:  1   • ziprasidone (Geodon) 60 MG capsule     Sig: Take 1 capsule by mouth 2 (Two) Times a Day With Meals.     Dispense:  60 capsule     Refill:  1   • hydrOXYzine (ATARAX) 10 MG tablet     Sig: Take 1-2 tablets by mouth 3 (Three) Times a Day As Needed (insomnia).     Dispense:  60 tablet     Refill:  0     Return in about 2 months (around 6/1/2022).          This document has been electronically signed by LAUREN Oswald   April 1, 2022 11:58 EDT    Part of this note may be an electronic transcription/translation of spoken language to printed text using the Dragon Dictation System.      Answers for HPI/ROS submitted by the patient on 9/23/2021  What is the primary reason for your visit?: Other  Please describe your symptoms.: Mental health care  Have you had these symptoms before?: Yes  How long  have you been having these symptoms?: Greater than 2 weeks

## 2022-04-21 ENCOUNTER — TELEPHONE (OUTPATIENT)
Dept: FAMILY MEDICINE CLINIC | Facility: CLINIC | Age: 47
End: 2022-04-21

## 2022-04-21 NOTE — TELEPHONE ENCOUNTER
Caller: Thomas Mireles    Relationship: Self    Best call back number: [phone]     PATIENT CALLED AND STATED THE PHARMACY TOLD HIM THAT HIS MEDICATION TRULICITY NEEDS A PRIOR AUTHORIZATION. PATIENT HAS BEEN OUT OF MEDICATION FOR A WEEK     PLEASE ADVISE

## 2022-04-28 ENCOUNTER — TELEPHONE (OUTPATIENT)
Dept: FAMILY MEDICINE CLINIC | Facility: CLINIC | Age: 47
End: 2022-04-28

## 2022-04-28 NOTE — TELEPHONE ENCOUNTER
Caller: Thomas Mireles    Relationship to patient: Self    Best call back number: [phone]    Patient is needing: PATIENT STATED THAT HE CALLED PHARMACY AND THEY ADVISED HIM THAT THEY ARE WAITING FOR  PRESCRIBER APPROVAL FOR TRULICITY MEDICATION AND WANTED TO SEE THE STATUS OF THIS     PLEASE ADVISE

## 2022-04-28 NOTE — TELEPHONE ENCOUNTER
04/28/2022 PA for Trulicity 1.5 MG/0.5 ML Pen has been approved, [id number] - [facility] [address] AT [facility] [address] - [phone] and Patient have been notified of approval.

## 2022-05-02 DIAGNOSIS — F51.05 INSOMNIA DUE TO MENTAL DISORDER: ICD-10-CM

## 2022-05-02 RX ORDER — HYDROXYZINE HYDROCHLORIDE 10 MG/1
10-20 TABLET, FILM COATED ORAL 3 TIMES DAILY PRN
Qty: 60 TABLET | Refills: 0 | Status: SHIPPED | OUTPATIENT
Start: 2022-05-02 | End: 2022-06-01 | Stop reason: SDUPTHER

## 2022-05-25 ENCOUNTER — OFFICE VISIT (OUTPATIENT)
Dept: FAMILY MEDICINE CLINIC | Facility: CLINIC | Age: 47
End: 2022-05-25

## 2022-05-25 VITALS
TEMPERATURE: 97.7 F | HEIGHT: 70 IN | HEART RATE: 77 BPM | BODY MASS INDEX: 36.94 KG/M2 | WEIGHT: 258 LBS | OXYGEN SATURATION: 99 % | SYSTOLIC BLOOD PRESSURE: 122 MMHG | DIASTOLIC BLOOD PRESSURE: 80 MMHG

## 2022-05-25 DIAGNOSIS — E78.5 HYPERLIPIDEMIA, UNSPECIFIED HYPERLIPIDEMIA TYPE: ICD-10-CM

## 2022-05-25 DIAGNOSIS — Z79.4 TYPE 2 DIABETES MELLITUS WITH HYPERGLYCEMIA, WITH LONG-TERM CURRENT USE OF INSULIN: Primary | ICD-10-CM

## 2022-05-25 DIAGNOSIS — I10 ESSENTIAL HYPERTENSION: ICD-10-CM

## 2022-05-25 DIAGNOSIS — E11.65 TYPE 2 DIABETES MELLITUS WITH HYPERGLYCEMIA, WITH LONG-TERM CURRENT USE OF INSULIN: Primary | ICD-10-CM

## 2022-05-25 DIAGNOSIS — E66.01 CLASS 2 SEVERE OBESITY DUE TO EXCESS CALORIES WITH SERIOUS COMORBIDITY AND BODY MASS INDEX (BMI) OF 37.0 TO 37.9 IN ADULT: ICD-10-CM

## 2022-05-25 DIAGNOSIS — F31.81 BIPOLAR II DISORDER: ICD-10-CM

## 2022-05-25 PROCEDURE — 99214 OFFICE O/P EST MOD 30 MIN: CPT | Performed by: NURSE PRACTITIONER

## 2022-05-25 RX ORDER — LISINOPRIL 10 MG/1
10 TABLET ORAL DAILY
Qty: 90 TABLET | Refills: 1 | Status: SHIPPED | OUTPATIENT
Start: 2022-05-25 | End: 2023-02-21 | Stop reason: SDUPTHER

## 2022-05-25 RX ORDER — DULAGLUTIDE 1.5 MG/.5ML
1.5 INJECTION, SOLUTION SUBCUTANEOUS WEEKLY
Qty: 9 ML | Refills: 1 | Status: SHIPPED | OUTPATIENT
Start: 2022-05-25 | End: 2022-11-21 | Stop reason: SDUPTHER

## 2022-05-25 RX ORDER — FENOFIBRATE 145 MG/1
145 TABLET, COATED ORAL DAILY
Qty: 90 TABLET | Refills: 1 | Status: SHIPPED | OUTPATIENT
Start: 2022-05-25 | End: 2022-11-21 | Stop reason: SDUPTHER

## 2022-05-25 RX ORDER — LINACLOTIDE 145 UG/1
CAPSULE, GELATIN COATED ORAL
COMMUNITY
Start: 2022-05-22 | End: 2022-08-03

## 2022-05-25 NOTE — PROGRESS NOTES
Subjective   Thomas Mireles is a 46 y.o. male.   Chief Compliant: The patient presents with Hyperlipidemia and Follow-up    Diabetes  He presents for his follow-up diabetic visit. He has type 2 diabetes mellitus. No MedicAlert identification noted. The initial diagnosis of diabetes was made 9 years ago. His disease course has been improving. Hypoglycemia symptoms include headaches (chronic) and mood changes. Pertinent negatives for hypoglycemia include no confusion, dizziness, hunger, nervousness/anxiousness, pallor, sleepiness, speech difficulty, sweats or tremors. There are no diabetic associated symptoms. Pertinent negatives for diabetes include no blurred vision, no chest pain, no foot paresthesias, no foot ulcerations, no polydipsia, no polyphagia and no polyuria. There are no hypoglycemic complications. Pertinent negatives for hypoglycemia complications include no blackouts, no hospitalization, no nocturnal hypoglycemia, no required assistance and no required glucagon injection. Symptoms are stable. Diabetic complications include impotence. Pertinent negatives for diabetic complications include no autonomic neuropathy, CVA, heart disease, nephropathy, peripheral neuropathy, PVD or retinopathy. Risk factors for coronary artery disease include obesity and stress. Current diabetic treatment includes insulin injections. He is compliant with treatment most of the time. He is currently taking insulin pre-breakfast. Insulin injections are given by patient. Rotation sites for injection include the abdominal wall. His weight is stable. He is following a diabetic diet. When asked about meal planning, he reported none. He has not had a previous visit with a dietitian. He participates in exercise daily. He monitors blood glucose at home 1-2 x per day. He monitors urine at home <1 x per month. Blood glucose monitoring compliance is adequate. There is no change in his home blood glucose trend. His breakfast blood glucose  is taken between 7-8 am. His breakfast blood glucose range is generally 130-140 mg/dl. His lunch blood glucose is taken between 11-12 pm. His lunch blood glucose range is generally 110-130 mg/dl. His dinner blood glucose is taken between 5-6 pm. His dinner blood glucose range is generally 110-130 mg/dl. His highest blood glucose is 110-130 mg/dl. His overall blood glucose range is 140-180 mg/dl. An ACE inhibitor/angiotensin II receptor blocker is not being taken. He does not see a podiatrist.Eye exam is current.   Hypertension  This is a chronic problem. The current episode started more than 1 year ago. The problem is uncontrolled. Associated symptoms include headaches (chronic). Pertinent negatives include no blurred vision, chest pain, malaise/fatigue, orthopnea, peripheral edema, PND or sweats. Risk factors for coronary artery disease include family history, obesity and smoking/tobacco exposure. Current antihypertension treatment includes ACE inhibitors. The current treatment provides moderate improvement. Compliance problems include exercise, psychosocial issues, medication side effects, medication cost and diet.  There is no history of CVA, PVD or retinopathy.   Hyperlipidemia  This is a chronic problem. The current episode started more than 1 year ago. Recent lipid tests were reviewed and are variable. Exacerbating diseases include diabetes and obesity. Factors aggravating his hyperlipidemia include fatty foods. Pertinent negatives include no chest pain. Current antihyperlipidemic treatment includes diet change. The current treatment provides moderate improvement of lipids. Compliance problems include adherence to exercise and adherence to diet.  Risk factors for coronary artery disease include family history, dyslipidemia, hypertension, male sex and obesity.   Depression  Visit Type: follow-up (following psych.  Feels he is much improved and stable)  Patient presents with the following symptoms:  "impotence.  Patient is not experiencing: confusion and nervousness/anxiety.       The following portions of the patient's history were reviewed and updated as appropriate: allergies, current medications, past family history, past medical history, past social history, past surgical history and problem list.      Review of Systems   Constitutional: Negative for activity change and malaise/fatigue.   Eyes: Negative for blurred vision.   Respiratory: Negative.    Cardiovascular: Negative.  Negative for chest pain, orthopnea and PND.   Endocrine: Negative for polydipsia, polyphagia and polyuria.   Genitourinary: Positive for impotence.   Skin: Negative for pallor.   Neurological: Positive for headaches (chronic). Negative for dizziness, tremors, syncope, facial asymmetry, speech difficulty and light-headedness.   Psychiatric/Behavioral: Negative for agitation, behavioral problems, confusion, self-injury and sleep disturbance. The patient is not nervous/anxious.    All other systems reviewed and are negative.      Procedures    Vitals: Blood pressure 122/80, pulse 77, temperature 97.7 °F (36.5 °C), temperature source Temporal, height 177.8 cm (70\"), weight 117 kg (258 lb), SpO2 99 %.     Allergies:   Allergies   Allergen Reactions   • Statins Other (See Comments)     All over pain,    • Zofran [Ondansetron] Nausea And Vomiting   • Zoloft [Sertraline Hcl] Nausea And Vomiting          Objective   Physical Exam   Constitutional: He is oriented to person, place, and time. He appears well-developed. No distress.   HENT:   Head: Normocephalic and atraumatic.   Right Ear: Hearing, tympanic membrane, external ear and ear canal normal.   Left Ear: Hearing, tympanic membrane, external ear and ear canal normal.   Nose: Nose normal. No mucosal edema or rhinorrhea. Right sinus exhibits no maxillary sinus tenderness and no frontal sinus tenderness. Left sinus exhibits no maxillary sinus tenderness and no frontal sinus tenderness. "   Mouth/Throat: Uvula is midline. No oropharyngeal exudate. Tonsils are 0 on the right. Tonsils are 0 on the left. No tonsillar exudate.   Eyes: Pupils are equal, round, and reactive to light. Conjunctivae are normal. Right eye exhibits no discharge. Left eye exhibits no discharge.   Neck: No thyromegaly present.   Cardiovascular: Normal rate, regular rhythm and normal heart sounds.   No murmur heard.  Pulmonary/Chest: Effort normal and breath sounds normal.   Musculoskeletal:      Right hip: Normal.      Left hip: Normal.      Right knee: Normal.      Cervical back: Normal.   Lymphadenopathy:     He has no cervical adenopathy.   Neurological: He is alert and oriented to person, place, and time. He has normal reflexes. He displays normal reflexes. No cranial nerve deficit or sensory deficit. He exhibits normal muscle tone. Coordination normal.   Skin: Skin is warm and dry. He is not diaphoretic.   Psychiatric: His behavior is normal. Judgment and thought content normal.   Nursing note and vitals reviewed.      Assessment & Plan   Discussed with patient impression and plan, patient verbalizes understanding.        During this visit the following were done:  Labs Reviewed [x]    Labs Ordered []    Radiology Reports Reviewed []    Radiology Ordered []    PCP Records Reviewed []    Referring Provider Records Reviewed []    ER Records Reviewed []    Hospital Records Reviewed []    History Obtained From Family []    Radiology Images Reviewed []    Other Reviewed []    Records Requested [x]      Diagnoses and all orders for this visit:    1. Type 2 diabetes mellitus with hyperglycemia, with long-term current use of insulin (CMS/[facility]) (Primary)  Continue with current medications    2. Arthralgia, unspecified joint  Continue with improved activity    3. Chronic daily headache  Continue with current meds    4. Bipolar II disorder (CMS/[facility])  Continue with psych    5. Essential hypertension  Continue with current  medications    6. Class 2 severe obesity due to excess calories with serious comorbidity and body mass index (BMI) of 38.0 to 38.9 in adult (CMS/[facility])  Continue with current meds          Patient is a non smoker    Patient's Body mass index is 37.02 kg/m². BMI is above normal parameters. Recommendations include: exercise counseling and nutrition counseling.               Physical Exam  Vitals and nursing note reviewed.   Constitutional:       General: He is not in acute distress.     Appearance: He is well-developed. He is not diaphoretic.   HENT:      Head: Normocephalic and atraumatic.      Right Ear: Hearing, tympanic membrane, ear canal and external ear normal.      Left Ear: Hearing, tympanic membrane, ear canal and external ear normal.      Nose: Nose normal. No mucosal edema or rhinorrhea.      Right Sinus: No maxillary sinus tenderness or frontal sinus tenderness.      Left Sinus: No maxillary sinus tenderness or frontal sinus tenderness.      Mouth/Throat:      Pharynx: Uvula midline. No oropharyngeal exudate.      Tonsils: No tonsillar exudate. 0 on the right. 0 on the left.   Eyes:      General:         Right eye: No discharge.         Left eye: No discharge.      Conjunctiva/sclera: Conjunctivae normal.      Pupils: Pupils are equal, round, and reactive to light.   Neck:      Thyroid: No thyromegaly.   Cardiovascular:      Rate and Rhythm: Normal rate and regular rhythm.      Heart sounds: Normal heart sounds. No murmur heard.  Pulmonary:      Effort: Pulmonary effort is normal.      Breath sounds: Normal breath sounds.   Musculoskeletal:      Cervical back: Neck supple. Normal.      Right hip: Normal.      Left hip: Normal.      Right knee: Normal.   Lymphadenopathy:      Cervical: No cervical adenopathy.   Skin:     General: Skin is warm and dry.   Neurological:      General: No focal deficit present.      Mental Status: He is alert and oriented to person, place, and time.      Cranial Nerves: No  cranial nerve deficit.      Sensory: No sensory deficit.      Motor: No abnormal muscle tone.      Coordination: Coordination normal.      Deep Tendon Reflexes: Reflexes are normal and symmetric. Reflexes normal.   Psychiatric:         Behavior: Behavior normal.         Thought Content: Thought content normal.         Judgment: Judgment normal.            Diagnoses and all orders for this visit:    1. Type 2 diabetes mellitus with hyperglycemia, with long-term current use of insulin ([facility]) (Primary)  -     Comprehensive Metabolic Panel; Future  -     Hemoglobin A1c; Future  -     Lipid Panel; Future  -     TSH; Future  -     MicroAlbumin, Urine, Random - Urine, Clean Catch; Future  -     Dulaglutide (Trulicity) 1.5 MG/0.5ML solution pen-injector; Inject 1.5 mg under the skin into the appropriate area as directed 1 (One) Time Per Week.  Dispense: 9 mL; Refill: 1  -     lisinopril (PRINIVIL,ZESTRIL) 10 MG tablet; Take 1 tablet by mouth Daily.  Dispense: 90 tablet; Refill: 1    2. Hyperlipidemia, unspecified hyperlipidemia type  -     fenofibrate (TRICOR) 145 MG tablet; Take 1 tablet by mouth Daily.  Dispense: 90 tablet; Refill: 1    3. Essential hypertension  -     metoprolol tartrate (LOPRESSOR) 25 MG tablet; Take 1 tablet by mouth Every 12 (Twelve) Hours.  Dispense: 180 tablet; Refill: 1    4. Bipolar II disorder ([facility])    5. Class 2 severe obesity due to excess calories with serious comorbidity and body mass index (BMI) of 37.0 to 37.9 in adult ([facility])      Class 2 Severe Obesity (BMI >=35 and <=39.9). Obesity-related health conditions include the following: hypertension, diabetes mellitus and dyslipidemias. Obesity is unchanged. BMI is is above average; BMI management plan is completed. We discussed portion control and increasing exercise.    Answers for HPI/ROS submitted by the patient on 5/23/2022  What is the primary reason for your visit?: Diabetes

## 2022-06-01 ENCOUNTER — OFFICE VISIT (OUTPATIENT)
Dept: PSYCHIATRY | Facility: CLINIC | Age: 47
End: 2022-06-01

## 2022-06-01 VITALS
DIASTOLIC BLOOD PRESSURE: 84 MMHG | SYSTOLIC BLOOD PRESSURE: 124 MMHG | WEIGHT: 263 LBS | HEIGHT: 70 IN | HEART RATE: 93 BPM | BODY MASS INDEX: 37.65 KG/M2

## 2022-06-01 DIAGNOSIS — F31.81 BIPOLAR II DISORDER: ICD-10-CM

## 2022-06-01 DIAGNOSIS — F51.05 INSOMNIA DUE TO MENTAL DISORDER: ICD-10-CM

## 2022-06-01 PROCEDURE — 90792 PSYCH DIAG EVAL W/MED SRVCS: CPT | Performed by: NURSE PRACTITIONER

## 2022-06-01 RX ORDER — TRAZODONE HYDROCHLORIDE 100 MG/1
150 TABLET ORAL NIGHTLY
Qty: 45 TABLET | Refills: 2 | Status: SHIPPED | OUTPATIENT
Start: 2022-06-01 | End: 2022-07-13 | Stop reason: SDUPTHER

## 2022-06-01 RX ORDER — HYDROXYZINE HYDROCHLORIDE 10 MG/1
10-20 TABLET, FILM COATED ORAL 3 TIMES DAILY PRN
Qty: 60 TABLET | Refills: 2 | Status: SHIPPED | OUTPATIENT
Start: 2022-06-01 | End: 2022-07-13 | Stop reason: SDUPTHER

## 2022-06-01 RX ORDER — ZIPRASIDONE HYDROCHLORIDE 60 MG/1
60 CAPSULE ORAL 2 TIMES DAILY WITH MEALS
Qty: 60 CAPSULE | Refills: 2 | Status: SHIPPED | OUTPATIENT
Start: 2022-06-01 | End: 2022-07-13 | Stop reason: SDUPTHER

## 2022-06-01 NOTE — PROGRESS NOTES
Subjective   Thomas Mireles is a 46 y.o. male who presents today for initial evaluation     Chief Complaint:  Depression, insomnia    History of Present Illness:   Mr. Mireles presents today for medication management follow up at the [facility] for initial visit after being transferred [facility] for continuation of treatment. Reports that he is doing well overall. 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 Says that he has been working an extra day each week recently due to coworker being off. Feels that overall mood is well controlled with current medication regimen.  Denies any frequents shifts in mood. Says that he has been on same medications for over one year now. Denies any adverse effects. Feels symptoms associated with both depression and anxiety are controlled. Rates depression 3/10 and rates anxiety 4/10 with 10 being the worst. He does admit that he struggles more with depression in winter months. Feels that sleep is adaquate. Reports that he struggles with sleep sometimes due to getting up so early to start his shifts. Appetite has been good, denies any changes in weight. Denies any SI/HI or A/V hallucinations.       The following portions of the patient's history were reviewed and updated as appropriate: allergies, current medications, past family history, past medical history, past social history, past surgical history and problem list.      Past Medical History:  Past Medical History:   Diagnosis Date   • Anxiety    • Bipolar 1 disorder (HCC)    • Depression    • Head injury 2016   • HL (hearing loss)    • Hyperlipidemia    • Hypertension    • Kidney stone 01/25/18   • Migraine    • Obesity    • Psychiatric illness    • Type 2 diabetes mellitus (HCC)        Social History:  Social History     Socioeconomic History   • Marital status: Single   Tobacco Use   • Smoking status: Former Smoker     Packs/day: 2.00     Years: 15.00     Pack years: 30.00     Start  date: 1993     Quit date: 3/6/2009     Years since quittin.2   • Smokeless tobacco: Never Used   Vaping Use   • Vaping Use: Never used   Substance and Sexual Activity   • Alcohol use: Yes     Alcohol/week: 1.0 standard drink     Types: 1 Cans of beer per week     Comment: Very seldom.   • Drug use: No   • Sexual activity: Not Currently     Partners: Female     Birth control/protection: Condom       Family History:  Family History   Problem Relation Age of Onset   • Hypertension Mother    • Hypertension Father    • Diabetes Maternal Grandfather    • Heart disease Maternal Grandfather    • Mental illness Maternal Grandfather         PTSD   • Liver cancer Paternal Grandfather    • Asthma Paternal Grandfather          in .   • Diabetes Paternal Grandfather        Past Surgical History:  Past Surgical History:   Procedure Laterality Date   • COLONOSCOPY N/A 2021    Procedure: COLONOSCOPY;  Surgeon: Judy Grant MD;  Location: [facility];  Service: Gastroenterology;  Laterality: N/A;   • KNEE SURGERY      right   • NASAL POLYP EXCISION     • URETHRAL DILATATION      infant       Problem List:  Patient Active Problem List   Diagnosis   • Hypertension   • Hyperlipidemia   • Type 2 diabetes mellitus with hyperglycemia (HCC)   • Bipolar II disorder (HCC)   • Rosacea   • Abnormal EKG   • Old myocardial infarction by EKG criteria.   • Dyspnea on exertion (class 2-3)   • Head injury   • Chronic idiopathic constipation       Allergy:   Allergies   Allergen Reactions   • Statins Other (See Comments)     All over pain,    • Zofran [Ondansetron] Nausea And Vomiting   • Zoloft [Sertraline Hcl] Nausea And Vomiting        Current Medications:   Current Outpatient Medications   Medication Sig Dispense Refill   • Accu-Chek Softclix Lancets lancets USE TO CHECK BLOOD GLUCOSE EVERY  each 1   • aspirin 81 MG EC tablet Take 1 tablet by mouth Daily. 90 tablet 1   • cyclobenzaprine (FLEXERIL) 5  MG tablet TAKE 1 TABLET BY MOUTH AT NIGHT AS NEEDED FOR MUSCLE SPASMS 90 tablet 1   • Dulaglutide (Trulicity) 1.5 MG/0.5ML solution pen-injector Inject 1.5 mg under the skin into the appropriate area as directed 1 (One) Time Per Week. 9 mL 1   • fenofibrate (TRICOR) 145 MG tablet Take 1 tablet by mouth Daily. 90 tablet 1   • glucose blood test strip For daily testing  E11.65 100 each 11   • glucose blood test strip Use as instructed to check sugar once daily 100 each 12   • glucose monitor monitoring kit 1 each As Needed (use to check sugar once daily). 1 each 0   • hydrOXYzine (ATARAX) 10 MG tablet Take 1-2 tablets by mouth 3 (Three) Times a Day As Needed for Anxiety (insomnia). 60 tablet 2   • Linzess 145 MCG capsule capsule TAKE ONE CAPSULE BY MOUTH EVERY DAY ON AN EMPTY STOMACH 30 MINUTES PRIOR TO MEALS     • lisinopril (PRINIVIL,ZESTRIL) 10 MG tablet Take 1 tablet by mouth Daily. 90 tablet 1   • metoprolol tartrate (LOPRESSOR) 25 MG tablet Take 1 tablet by mouth Every 12 (Twelve) Hours. 180 tablet 1   • metroNIDAZOLE (METROGEL) 0.75 % gel Apply  topically to the appropriate area as directed 2 (Two) Times a Day. 45 g 5   • naproxen (Naprosyn) 500 MG tablet Take 1 tablet by mouth 2 (Two) Times a Day With Meals. 180 tablet 1   • Omega-3 Fatty Acids (fish oil) 1000 MG capsule capsule Take 1 capsule by mouth 2 (Two) Times a Day With Meals. 180 capsule 1   • pantoprazole (PROTONIX) 40 MG EC tablet      • traZODone (DESYREL) 100 MG tablet Take 1 & 1/2 tablets by mouth Every Night. 45 tablet 2   • triamcinolone (KENALOG) 0.1 % cream Apply 1 application topically to the appropriate area as directed 2 (Two) Times a Day. 80 g 2   • ziprasidone (Geodon) 60 MG capsule Take 1 capsule by mouth 2 (Two) Times a Day With Meals. 60 capsule 2     No current facility-administered medications for this visit.       Review of Symptoms:    Review of Systems   Constitutional: Negative for activity change, appetite change, fatigue,  "unexpected weight gain and unexpected weight loss.   Eyes: Negative for visual disturbance.   Respiratory: Negative for shortness of breath.    Cardiovascular: Negative for chest pain and palpitations.   Psychiatric/Behavioral: Positive for dysphoric mood and sleep disturbance. Negative for suicidal ideas. The patient is nervous/anxious.      Physical Exam:   Physical Exam  Vitals reviewed.   Constitutional:       General: He is not in acute distress.     Appearance: Normal appearance.   Neurological:      Mental Status: He is alert.      Gait: Gait normal.       Vitals:   Blood pressure 124/84, pulse 93, height 177.8 cm (70\"), weight 119 kg (263 lb).    Mental Status Exam:   Hygiene:   good  Cooperation:  Cooperative  Eye Contact:  Good  Psychomotor Behavior:  Appropriate  Affect:  Appropriate  Mood: euthymic  Hopelessness: Denies  Speech:  Normal  Thought Process:  Goal directed and Linear  Thought Content:  Mood congruent  Suicidal:  None  Homicidal:  None  Hallucinations:  None  Delusion:  None  Memory:  Intact  Orientation:  Person, Place, Time and Situation  Reliability:  fair  Insight:  Fair  Judgement:  Fair  Impulse Control:  Fair    Lab Results:   Lab Requisition on 02/16/2022   Component Date Value Ref Range Status   • QuantiFERON Incubation 02/16/2022 Incubation performed.   Final   • QUANTIFERON-TB GOLD PLUS 02/16/2022 Negative  Negative Final    Chemiluminescence immunoassay methodology   • QuantiFERON Criteria 02/16/2022 Comment   Final    The QuantiFERON-TB Gold Plus result is determined by subtracting  the Nil value from either TB antigen (Ag) tube. The mitogen tube  serves as a control for the test.   • QUANTIFERON TB1 AG VALUE 02/16/2022 0.15  IU/mL Final   • QUANTIFERON TB2 AG VALUE 02/16/2022 0.16  IU/mL Final   • QuantiFERON Nil Value 02/16/2022 0.16  IU/mL Final   • QuantiFERON Mitogen Value 02/16/2022 >10.00  IU/mL Final   Lab Requisition on 02/07/2022   Component Date Value Ref Range Status "   • Hep B S Ab 02/07/2022 Non-Reactive   Final   • Mumps IgG 02/07/2022 45.6  Immune >10.9 AU/mL Final                                    Negative         <9.0                                  Equivocal  9.0 - 10.9                                  Positive        >10.9  A positive result generally indicates past exposure to  Mumps virus or previous vaccination.   • Rubella Antibodies, IgG 02/07/2022 <0.90 (A) Immune >0.99 index Final                                    Non-immune       <0.90                                  Equivocal  0.90 - 0.99                                  Immune           >0.99   • Rubeola IgG 02/07/2022 23.5  Immune >16.4 AU/mL Final                                     Negative        <13.5                                   Equivocal 13.5 - 16.4                                   Positive        >16.4  Presence of antibodies to Rubeola is presumptive evidence  of immunity except when acute infection is suspected.   • Varicella IgG 02/07/2022 2353  Immune >165 index Final                                   Negative          <135                                 Equivocal    135 - 165                                 Positive          >165  A positive result generally indicates exposure to the  pathogen or administration of specific immunoglobulins,  but it is not indication of active infection or stage  of disease.   • QUANTIFERON-TB GOLD PLUS 02/07/2022    Final    Test not performed. Specimen is turbid.  Your facility has been notified 02/11/2022  Chemiluminescence immunoassay methodology   Lab on 12/13/2021   Component Date Value Ref Range Status   • Testosterone, Total 12/13/2021 302  264 - 916 ng/dL Final    Comment: Adult male reference interval is based on a population of  healthy nonobese males (BMI <30) between 19 and 39 years old.  Price, et.al. JCEM 2017,102;0029-4300. PMID: 55431168.     • Testosterone, Free 12/13/2021 22.8 (A) 6.8 - 21.5 pg/mL Final   • Total Cholesterol 12/13/2021  164  0 - 200 mg/dL Final    Comment: Cholesterol Reference Ranges  (U.S. Department of Health and Human Services ATP III  Classifications)  Desirable          <200 mg/dL  Borderline High    200-239 mg/dL  High Risk          >240 mg/dL  Triglyceride Reference Ranges  (U.S. Department of Health and Human Services ATP III  Classifications)  Normal           <150 mg/dL  Borderline High  150-199 mg/dL  High             200-499 mg/dL  Very High        >500 mg/dL  HDL Reference Ranges  (U.S. Department of Health and Human Services ATP III  Classifcations)  Low     <40 mg/dl (major risk factor for CHD)  High    >60 mg/dl ('negative' risk factor for CHD)  LDL Reference Ranges  (U.S. Department of Health and Human Services ATP III  Classifcations)  Optimal          <100 mg/dL  Near Optimal     100-129 mg/dL  Borderline High  130-159 mg/dL  High             160-189 mg/dL  Very High        >189 mg/dL     • Triglycerides 12/13/2021 106  0 - 150 mg/dL Final   • HDL Cholesterol 12/13/2021 38 (A) 40 - 60 mg/dL Final   • VLDL Cholesterol Jr 12/13/2021 20  5 - 40 mg/dL Final   • LDL Chol Calc ([facility]) 12/13/2021 106 (A) 0 - 100 mg/dL Final   • Free T4 12/13/2021 1.43  0.93 - 1.70 ng/dL Final    Results may be falsely increased if patient taking Biotin.   • TSH 12/13/2021 1.430  0.270 - 4.200 uIU/mL Final   • Hemoglobin A1C 12/13/2021 5.30  4.80 - 5.60 % Final    Comment: Hemoglobin A1C Ranges:  Increased Risk for Diabetes  5.7% to 6.4%  Diabetes                     >= 6.5%  Diabetic Goal                < 7.0%     • Glucose 12/13/2021 112 (A) 65 - 99 mg/dL Final   • BUN 12/13/2021 20  6 - 20 mg/dL Final   • Creatinine 12/13/2021 1.29 (A) 0.76 - 1.27 mg/dL Final   • eGFR Non  Am 12/13/2021 60 (A) >60 mL/min/1.73 Final    Comment: GFR Normal >60  Chronic Kidney Disease <60  Kidney Failure <15     • eGFR  Am 12/13/2021 72  >60 mL/min/1.73 Final   • BUN/Creatinine Ratio 12/13/2021 15.5  7.0 - 25.0 Final   • Sodium 12/13/2021  139  136 - 145 mmol/L Final   • Potassium 12/13/2021 4.2  3.5 - 5.2 mmol/L Final   • Chloride 12/13/2021 103  98 - 107 mmol/L Final   • Total CO2 12/13/2021 27.1  22.0 - 29.0 mmol/L Final   • Calcium 12/13/2021 9.5  8.6 - 10.5 mg/dL Final   • Total Protein 12/13/2021 7.2  6.0 - 8.5 g/dL Final   • Albumin 12/13/2021 4.60  3.50 - 5.20 g/dL Final   • Globulin 12/13/2021 2.6  gm/dL Final   • A/G Ratio 12/13/2021 1.8  g/dL Final   • Total Bilirubin 12/13/2021 0.8  0.0 - 1.2 mg/dL Final   • Alkaline Phosphatase 12/13/2021 63  39 - 117 U/L Final   • AST (SGOT) 12/13/2021 19  1 - 40 U/L Final   • ALT (SGPT) 12/13/2021 20  1 - 41 U/L Final   • WBC 12/13/2021 5.42  3.40 - 10.80 10*3/mm3 Final   • RBC 12/13/2021 4.98  4.14 - 5.80 10*6/mm3 Final   • Hemoglobin 12/13/2021 14.9  13.0 - 17.7 g/dL Final   • Hematocrit 12/13/2021 44.8  37.5 - 51.0 % Final   • MCV 12/13/2021 90.0  79.0 - 97.0 fL Final   • MCH 12/13/2021 29.9  26.6 - 33.0 pg Final   • MCHC 12/13/2021 33.3  31.5 - 35.7 g/dL Final   • RDW 12/13/2021 13.1  12.3 - 15.4 % Final   • Platelets 12/13/2021 245  140 - 450 10*3/mm3 Final   • Neutrophil Rel % 12/13/2021 61.6  42.7 - 76.0 % Final   • Lymphocyte Rel % 12/13/2021 22.7  19.6 - 45.3 % Final   • Monocyte Rel % 12/13/2021 11.1  5.0 - 12.0 % Final   • Eosinophil Rel % 12/13/2021 3.7  0.3 - 6.2 % Final   • Basophil Rel % 12/13/2021 0.7  0.0 - 1.5 % Final   • Neutrophils Absolute 12/13/2021 3.34  1.70 - 7.00 10*3/mm3 Final   • Lymphocytes Absolute 12/13/2021 1.23  0.70 - 3.10 10*3/mm3 Final   • Monocytes Absolute 12/13/2021 0.60  0.10 - 0.90 10*3/mm3 Final   • Eosinophils Absolute 12/13/2021 0.20  0.00 - 0.40 10*3/mm3 Final   • Basophils Absolute 12/13/2021 0.04  0.00 - 0.20 10*3/mm3 Final   • Immature Granulocyte Rel % 12/13/2021 0.2  0.0 - 0.5 % Final   • Immature Grans Absolute 12/13/2021 0.01  0.00 - 0.05 10*3/mm3 Final   • nRBC 12/13/2021 0.0  0.0 - 0.2 /100 WBC Final   • Interpretation 12/13/2021 Note   Final     Supplemental report is available.       EKG Results:  No orders to display       Assessment & Plan   Problems Addressed this Visit        Mental Health    Bipolar II disorder (HCC)    Relevant Medications    hydrOXYzine (ATARAX) 10 MG tablet    ziprasidone (Geodon) 60 MG capsule    traZODone (DESYREL) 100 MG tablet      Other Visit Diagnoses     Insomnia due to mental disorder        Relevant Medications    hydrOXYzine (ATARAX) 10 MG tablet    ziprasidone (Geodon) 60 MG capsule    traZODone (DESYREL) 100 MG tablet      Diagnoses       Codes Comments    Insomnia due to mental disorder     ICD-10-CM: F51.05  ICD-9-CM: 300.9, 327.02     Bipolar II disorder (HCC)     ICD-10-CM: F31.81  ICD-9-CM: 296.89           Visit Diagnoses:    ICD-10-CM ICD-9-CM   1. Insomnia due to mental disorder  F51.05 300.9     327.02   2. Bipolar II disorder (HCC)  F31.81 296.89       GOALS:  Short Term Goals: Patient will be compliant with medication, and patient will have no significant medication related side effects.  Patient will be engaged in psychotherapy as indicated.  Patient will report subjective improvement of symptoms.  Long term goals: To stabilize mood and treat/improve subjective symptoms, the patient will stay out of the hospital, the patient will be at an optimal level of functioning, and the patient will take all medications as prescribed.  The patient/guardian verbalized understanding and agreement with goals that were mutually set.    TREATMENT PLAN: Continue supportive psychotherapy efforts and medications as indicated for patient's diagnosis.  Pharmacological and Non-Pharmacological treatment options discussed during today's visit. Patient/Guardian acknowledged and verbally consented with current treatment plan and was educated on the importance of compliance with treatment and follow-up appointments.      Discussed medication options and treatment plan of prescribed medication as well as the risks, benefits, any black  box warnings, and side effects including potential falls, possible impaired driving, and metabolic adversities among others. Patient is agreeable to call the office with any worsening of symptoms or onset of side effects, or if any concerns or questions arise.  The contact information for the office is made available to the patient. Patient is agreeable to call 911 or go to the nearest ER should they begin having any SI/HI, or if any urgent concerns arise. No medication side effects or related complaints today.     -Reviewed previous available documentation     -Reviewed most recent available labs      -GLENN reviewed and is appropriate.    -Discussed importance of counseling to decrease anxiety like symptoms. Discussed coping mechanisms to decrease stress and anxiety: relaxation techniques, guided imagery, music therapy, staying active, support groups, diversional activities and avoid aggravating factors.  Discussed different coping mechanisms to better control depression.    Discussed in detail, the possible side effects of antipsychotic medications including increased cholesterol, increased blood sugar, and the possibility of gaining weight. Also discussed risk of muscle movement disorders with this class of medication. Labs will need to be monitored regularly while taking this type of medication.   Encouraged him to obtain updated labs.     Will continue current medication regimen as previously prescribed  -Continue Geodon 60 mg twice daily for mood stabilization  -Continue Hydroxyzine HCL 10 mg to 20 mg three times per day as needed for anxiety and insomnia  -Continue Trazodone 150 mg daily at night for insomnia and depression.    MEDS ORDERED DURING VISIT:  New Medications Ordered This Visit   Medications   • hydrOXYzine (ATARAX) 10 MG tablet     Sig: Take 1-2 tablets by mouth 3 (Three) Times a Day As Needed for Anxiety (insomnia).     Dispense:  60 tablet     Refill:  2   • ziprasidone (Geodon) 60 MG capsule      Sig: Take 1 capsule by mouth 2 (Two) Times a Day With Meals.     Dispense:  60 capsule     Refill:  2   • traZODone (DESYREL) 100 MG tablet     Sig: Take 1 & 1/2 tablets by mouth Every Night.     Dispense:  45 tablet     Refill:  2       FOLLOW UP:  Return in about 3 months (around 9/1/2022) for Recheck.             This document has been electronically signed by LAUREN Fish  June 1, 2022 16:11 EDT    Please note that portions of this note were completed with a voice recognition program. Efforts were made to edit dictation, but occasionally words are mistranscribed.

## 2022-06-14 ENCOUNTER — TELEPHONE (OUTPATIENT)
Dept: FAMILY MEDICINE CLINIC | Facility: CLINIC | Age: 47
End: 2022-06-14

## 2022-06-14 NOTE — TELEPHONE ENCOUNTER
Attempted to contact pt regarding overdue fasting labs, left voicemail for pt to return call.       Spoke with pt about overdue labs, he acknowledged he had them, but did not give a time that he would get them done.     Hub to read message.

## 2022-06-18 DIAGNOSIS — Z79.4 TYPE 2 DIABETES MELLITUS WITH HYPERGLYCEMIA, WITH LONG-TERM CURRENT USE OF INSULIN: ICD-10-CM

## 2022-06-18 DIAGNOSIS — E11.65 TYPE 2 DIABETES MELLITUS WITH HYPERGLYCEMIA, WITH LONG-TERM CURRENT USE OF INSULIN: ICD-10-CM

## 2022-06-20 ENCOUNTER — HOSPITAL ENCOUNTER (EMERGENCY)
Facility: HOSPITAL | Age: 47
Discharge: HOME | End: 2022-06-20
Attending: EMERGENCY MEDICINE | Admitting: EMERGENCY MEDICINE

## 2022-06-20 ENCOUNTER — APPOINTMENT (OUTPATIENT)
Dept: GENERAL RADIOLOGY | Facility: HOSPITAL | Age: 47
End: 2022-06-20

## 2022-06-20 VITALS
DIASTOLIC BLOOD PRESSURE: 107 MMHG | WEIGHT: 255 LBS | HEIGHT: 70 IN | OXYGEN SATURATION: 99 % | HEART RATE: 110 BPM | SYSTOLIC BLOOD PRESSURE: 149 MMHG | TEMPERATURE: 97.8 F | BODY MASS INDEX: 36.51 KG/M2 | RESPIRATION RATE: 18 BRPM

## 2022-06-20 DIAGNOSIS — T14.8XXA AVULSION OF SKIN: Primary | ICD-10-CM

## 2022-06-20 PROCEDURE — 99282 EMERGENCY DEPT VISIT SF MDM: CPT

## 2022-06-20 PROCEDURE — 90715 TDAP VACCINE 7 YRS/> IM: CPT | Performed by: PHYSICIAN ASSISTANT

## 2022-06-20 PROCEDURE — 73130 X-RAY EXAM OF HAND: CPT

## 2022-06-20 PROCEDURE — 90471 IMMUNIZATION ADMIN: CPT | Performed by: PHYSICIAN ASSISTANT

## 2022-06-20 PROCEDURE — 25010000002 TETANUS-DIPHTH-ACELL PERTUSSIS 5-2.5-18.5 LF-MCG/0.5 SUSPENSION PREFILLED SYRINGE: Performed by: PHYSICIAN ASSISTANT

## 2022-06-20 RX ORDER — BLOOD SUGAR DIAGNOSTIC
STRIP MISCELLANEOUS
Qty: 60 EACH | Refills: 11 | Status: SHIPPED | OUTPATIENT
Start: 2022-06-20

## 2022-06-20 RX ADMIN — TETANUS TOXOID, REDUCED DIPHTHERIA TOXOID AND ACELLULAR PERTUSSIS VACCINE, ADSORBED 0.5 ML: 5; 2.5; 8; 8; 2.5 SUSPENSION INTRAMUSCULAR at 18:57

## 2022-06-20 NOTE — ED PROVIDER NOTES
Subjective   46-year-old male with past medical history of anxiety, depression, bipolar disorder, hyperlipidemia, hypertension, nephrolithiasis, type 2 diabetes, and migraines presents to the emergency room with a left middle finger injury.  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  He is not up-to-date on tetanus.  He does have full range of motion in his finger.  He denies any previous left finger injuries in the past.  Denies any aggravating or alleviating factors.  Denies any other complaints or concerns at this time.      History provided by:  Patient   used: No        Review of Systems   Constitutional: Negative.  Negative for fever.   HENT: Negative.    Respiratory: Negative.    Cardiovascular: Negative.  Negative for chest pain.   Gastrointestinal: Negative.  Negative for abdominal pain.   Endocrine: Negative.    Genitourinary: Negative.  Negative for dysuria.   Skin: Positive for wound.   Neurological: Negative.    Psychiatric/Behavioral: Negative.    All other systems reviewed and are negative.      Past Medical History:   Diagnosis Date   • Anxiety    • Bipolar 1 disorder (HCC)    • Depression    • Head injury 2016   • HL (hearing loss)    • Hyperlipidemia    • Hypertension    • Kidney stone 01/25/18   • Migraine    • Obesity    • Psychiatric illness    • Type 2 diabetes mellitus (HCC)        Allergies   Allergen Reactions   • Statins Other (See Comments)     All over pain,    • Zofran [Ondansetron] Nausea And Vomiting   • Zoloft [Sertraline Hcl] Nausea And Vomiting       Past Surgical History:   Procedure Laterality Date   • COLONOSCOPY N/A 7/27/2021    Procedure: COLONOSCOPY;  Surgeon: Judy Grant MD;  Location: [facility];  Service: Gastroenterology;  Laterality: N/A;   • KNEE SURGERY  1992    right   • NASAL POLYP EXCISION  2009   • URETHRAL DILATATION      infant        Family History   Problem Relation Age of Onset   • Hypertension Mother    • Hypertension Father    • Diabetes Maternal Grandfather    • Heart disease Maternal Grandfather    • Mental illness Maternal Grandfather         PTSD   • Liver cancer Paternal Grandfather    • Asthma Paternal Grandfather          in .   • Diabetes Paternal Grandfather        Social History     Socioeconomic History   • Marital status: Single   Tobacco Use   • Smoking status: Former Smoker     Packs/day: 2.00     Years: 15.00     Pack years: 30.00     Start date: 1993     Quit date: 3/6/2009     Years since quittin.2   • Smokeless tobacco: Never Used   Vaping Use   • Vaping Use: Never used   Substance and Sexual Activity   • Alcohol use: Yes     Alcohol/week: 1.0 standard drink     Types: 1 Cans of beer per week     Comment: Very seldom.   • Drug use: No   • Sexual activity: Not Currently     Partners: Female     Birth control/protection: Condom           Objective   Physical Exam  Vitals and nursing note reviewed.   Constitutional:       General: He is not in acute distress.     Appearance: He is well-developed. He is not diaphoretic.   HENT:      Head: Normocephalic and atraumatic.      Right Ear: External ear normal.      Left Ear: External ear normal.      Nose: Nose normal.   Eyes:      Conjunctiva/sclera: Conjunctivae normal.      Pupils: Pupils are equal, round, and reactive to light.   Neck:      Vascular: No JVD.      Trachea: No tracheal deviation.   Cardiovascular:      Rate and Rhythm: Normal rate and regular rhythm.      Heart sounds: Normal heart sounds. No murmur heard.  Pulmonary:      Effort: Pulmonary effort is normal. No respiratory distress.      Breath sounds: Normal breath sounds. No wheezing.   Abdominal:      General: Bowel sounds are normal.      Palpations: Abdomen is soft.      Tenderness: There is no abdominal tenderness.   Musculoskeletal:         General: No deformity. Normal range of  motion.      Cervical back: Normal range of motion and neck supple.   Skin:     General: Skin is warm and dry.      Coloration: Skin is not pale.      Findings: Lesion present. No erythema or rash.          Neurological:      Mental Status: He is alert and oriented to person, place, and time.      Cranial Nerves: No cranial nerve deficit.   Psychiatric:         Behavior: Behavior normal.         Thought Content: Thought content normal.         Procedures           ED Course                                                 MDM  Number of Diagnoses or Management Options  Avulsion of skin: new and requires workup     Amount and/or Complexity of Data Reviewed  Tests in the radiology section of CPT®: ordered and reviewed    Risk of Complications, Morbidity, and/or Mortality  Presenting problems: moderate  Diagnostic procedures: moderate  Management options: low    Patient Progress  Patient progress: stable      Final diagnoses:   Avulsion of skin       ED Disposition  ED Disposition     ED Disposition   Discharge    Condition   Stable    Comment   --             Kaycee Aleman, APRN  [address] [place] [address]  [phone]    In 2 days           Medication List      No changes were made to your prescriptions during this visit.          Yolanda Villavicencio PACelestineC  06/20/22 1955

## 2022-07-13 ENCOUNTER — OFFICE VISIT (OUTPATIENT)
Dept: PSYCHIATRY | Facility: CLINIC | Age: 47
End: 2022-07-13

## 2022-07-13 VITALS
HEART RATE: 79 BPM | WEIGHT: 256 LBS | HEIGHT: 70 IN | DIASTOLIC BLOOD PRESSURE: 82 MMHG | SYSTOLIC BLOOD PRESSURE: 130 MMHG | BODY MASS INDEX: 36.65 KG/M2

## 2022-07-13 DIAGNOSIS — F31.81 BIPOLAR II DISORDER: ICD-10-CM

## 2022-07-13 DIAGNOSIS — F51.05 INSOMNIA DUE TO MENTAL DISORDER: ICD-10-CM

## 2022-07-13 PROCEDURE — 99214 OFFICE O/P EST MOD 30 MIN: CPT | Performed by: NURSE PRACTITIONER

## 2022-07-13 RX ORDER — TRAZODONE HYDROCHLORIDE 100 MG/1
150 TABLET ORAL NIGHTLY
Qty: 45 TABLET | Refills: 2 | Status: SHIPPED | OUTPATIENT
Start: 2022-07-13 | End: 2022-08-24 | Stop reason: SDUPTHER

## 2022-07-13 RX ORDER — ZIPRASIDONE HYDROCHLORIDE 80 MG/1
80 CAPSULE ORAL 2 TIMES DAILY WITH MEALS
Qty: 60 CAPSULE | Refills: 2 | Status: SHIPPED | OUTPATIENT
Start: 2022-07-13 | End: 2022-08-24 | Stop reason: SDUPTHER

## 2022-07-13 RX ORDER — HYDROXYZINE HYDROCHLORIDE 10 MG/1
10-20 TABLET, FILM COATED ORAL 3 TIMES DAILY PRN
Qty: 60 TABLET | Refills: 2 | Status: SHIPPED | OUTPATIENT
Start: 2022-07-13 | End: 2022-08-24 | Stop reason: SDUPTHER

## 2022-07-13 NOTE — PROGRESS NOTES
Subjective   Thomas Mireles is a 46 y.o. male who presents today for follow up    Chief Complaint:  Depression, insomnia    History of Present Illness:   Mr. Mireles presents today for medication management follow up. Reports increased depression that has been present for past couple weeks. Admits to increase in irritability and fatigue. Says that he was sent home from work yesterday after hitting a machine when he became frustrated with the equipment. Says that symptoms have continued to get progressively worse. Often feels that his reaction is out of proportion to the situation. Admits to about 4 panic episodes over last two weeks with symptoms that include tunnel vision, SOA and palpitations. Rates depression 7/10 and rates anxiety 8/10 with 10 being the worst. Sleeping 4-5 hours each night. Appetite has been good. Has been on current regimen of Geodon, Trazodone and Hydroxyzine for more than one year, denies any adverse effects of medications. Denies any SI/HI or A/V hallucinations.       The following portions of the patient's history were reviewed and updated as appropriate: allergies, current medications, past family history, past medical history, past social history, past surgical history and problem list.      Past Medical History:  Past Medical History:   Diagnosis Date   • Anxiety    • Bipolar 1 disorder (HCC)    • Depression    • Head injury 2016   • HL (hearing loss)    • Hyperlipidemia    • Hypertension    • Kidney stone 18   • Migraine    • Obesity    • Psychiatric illness    • Type 2 diabetes mellitus (HCC)        Social History:  Social History     Socioeconomic History   • Marital status: Single   Tobacco Use   • Smoking status: Former Smoker     Packs/day: 2.00     Years: 15.00     Pack years: 30.00     Start date: 1993     Quit date: 3/6/2009     Years since quittin.3   • Smokeless tobacco: Never Used   Vaping Use   • Vaping Use: Never used   Substance and Sexual Activity   •  Alcohol use: Yes     Alcohol/week: 1.0 standard drink     Types: 1 Cans of beer per week     Comment: Very seldom.   • Drug use: No   • Sexual activity: Not Currently     Partners: Female     Birth control/protection: Condom       Family History:  Family History   Problem Relation Age of Onset   • Hypertension Mother    • Hypertension Father    • Diabetes Maternal Grandfather    • Heart disease Maternal Grandfather    • Mental illness Maternal Grandfather         PTSD   • Liver cancer Paternal Grandfather    • Asthma Paternal Grandfather          in .   • Diabetes Paternal Grandfather        Past Surgical History:  Past Surgical History:   Procedure Laterality Date   • COLONOSCOPY N/A 2021    Procedure: COLONOSCOPY;  Surgeon: Judy Grant MD;  Location: [facility];  Service: Gastroenterology;  Laterality: N/A;   • KNEE SURGERY      right   • NASAL POLYP EXCISION     • URETHRAL DILATATION      infant       Problem List:  Patient Active Problem List   Diagnosis   • Hypertension   • Hyperlipidemia   • Type 2 diabetes mellitus with hyperglycemia (HCC)   • Bipolar II disorder (HCC)   • Rosacea   • Abnormal EKG   • Old myocardial infarction by EKG criteria.   • Dyspnea on exertion (class 2-3)   • Head injury   • Chronic idiopathic constipation       Allergy:   Allergies   Allergen Reactions   • Statins Other (See Comments)     All over pain,    • Zofran [Ondansetron] Nausea And Vomiting   • Zoloft [Sertraline Hcl] Nausea And Vomiting        Current Medications:   Current Outpatient Medications   Medication Sig Dispense Refill   • Accu-Chek Guide test strip USE AS DIRECTED EVERY DAY 60 each 11   • Accu-Chek Softclix Lancets lancets USE TO CHECK BLOOD GLUCOSE EVERY  each 1   • aspirin 81 MG EC tablet Take 1 tablet by mouth Daily. 90 tablet 1   • cyclobenzaprine (FLEXERIL) 5 MG tablet TAKE 1 TABLET BY MOUTH AT NIGHT AS NEEDED FOR MUSCLE SPASMS 90 tablet 1   • Dulaglutide (Trulicity)  1.5 MG/0.5ML solution pen-injector Inject 1.5 mg under the skin into the appropriate area as directed 1 (One) Time Per Week. 9 mL 1   • fenofibrate (TRICOR) 145 MG tablet Take 1 tablet by mouth Daily. 90 tablet 1   • glucose blood test strip For daily testing  E11.65 100 each 11   • glucose monitor monitoring kit 1 each As Needed (use to check sugar once daily). 1 each 0   • hydrOXYzine (ATARAX) 10 MG tablet Take 1 to 2 tablets by mouth 3 (Three) Times a Day As Needed for Anxiety (insomnia). 60 tablet 2   • Linzess 145 MCG capsule capsule TAKE ONE CAPSULE BY MOUTH EVERY DAY ON AN EMPTY STOMACH 30 MINUTES PRIOR TO MEALS     • lisinopril (PRINIVIL,ZESTRIL) 10 MG tablet Take 1 tablet by mouth Daily. 90 tablet 1   • metoprolol tartrate (LOPRESSOR) 25 MG tablet Take 1 tablet by mouth Every 12 (Twelve) Hours. 180 tablet 1   • metroNIDAZOLE (METROGEL) 0.75 % gel Apply  topically to the appropriate area as directed 2 (Two) Times a Day. 45 g 5   • naproxen (Naprosyn) 500 MG tablet Take 1 tablet by mouth 2 (Two) Times a Day With Meals. 180 tablet 1   • Omega-3 Fatty Acids (fish oil) 1000 MG capsule capsule Take 1 capsule by mouth 2 (Two) Times a Day With Meals. 180 capsule 1   • pantoprazole (PROTONIX) 40 MG EC tablet      • traZODone (DESYREL) 100 MG tablet Take 1 & 1/2 tablets by mouth Every Night. 45 tablet 2   • triamcinolone (KENALOG) 0.1 % cream Apply 1 application topically to the appropriate area as directed 2 (Two) Times a Day. 80 g 2   • ziprasidone (Geodon) 80 MG capsule Take 1 capsule by mouth 2 (Two) Times a Day With Meals for 90 days. 60 capsule 2     No current facility-administered medications for this visit.       Review of Symptoms:    Review of Systems   Constitutional: Negative for activity change, appetite change, fatigue, unexpected weight gain and unexpected weight loss.   Eyes: Negative for visual disturbance.   Respiratory: Negative for shortness of breath.    Cardiovascular: Negative for chest pain  "and palpitations.   Psychiatric/Behavioral: Positive for dysphoric mood and sleep disturbance. Negative for suicidal ideas. The patient is nervous/anxious.      Physical Exam:   Physical Exam  Vitals reviewed.   Constitutional:       General: He is not in acute distress.     Appearance: Normal appearance.   Neurological:      Mental Status: He is alert.      Gait: Gait normal.       Vitals:   Blood pressure 130/82, pulse 79, height 177.8 cm (70\"), weight 116 kg (256 lb).    Mental Status Exam:   Hygiene:   good  Cooperation:  Cooperative  Eye Contact:  Good  Psychomotor Behavior:  Appropriate  Affect:  Appropriate  Mood: euthymic  Hopelessness: Denies  Speech:  Normal  Thought Process:  Goal directed and Linear  Thought Content:  Mood congruent  Suicidal:  None  Homicidal:  None  Hallucinations:  None  Delusion:  None  Memory:  Intact  Orientation:  Person, Place, Time and Situation  Reliability:  fair  Insight:  Fair  Judgement:  Fair  Impulse Control:  Fair    Lab Results:   Lab Requisition on 02/16/2022   Component Date Value Ref Range Status   • QuantiFERON Incubation 02/16/2022 Incubation performed.   Final   • QUANTIFERON-TB GOLD PLUS 02/16/2022 Negative  Negative Final    Chemiluminescence immunoassay methodology   • QuantiFERON Criteria 02/16/2022 Comment   Final    The QuantiFERON-TB Gold Plus result is determined by subtracting  the Nil value from either TB antigen (Ag) tube. The mitogen tube  serves as a control for the test.   • QUANTIFERON TB1 AG VALUE 02/16/2022 0.15  IU/mL Final   • QUANTIFERON TB2 AG VALUE 02/16/2022 0.16  IU/mL Final   • QuantiFERON Nil Value 02/16/2022 0.16  IU/mL Final   • QuantiFERON Mitogen Value 02/16/2022 >10.00  IU/mL Final   Lab Requisition on 02/07/2022   Component Date Value Ref Range Status   • Hep B S Ab 02/07/2022 Non-Reactive   Final   • Mumps IgG 02/07/2022 45.6  Immune >10.9 AU/mL Final                                    Negative         <9.0                          "         Equivocal  9.0 - 10.9                                  Positive        >10.9  A positive result generally indicates past exposure to  Mumps virus or previous vaccination.   • Rubella Antibodies, IgG 02/07/2022 <0.90 (A) Immune >0.99 index Final                                    Non-immune       <0.90                                  Equivocal  0.90 - 0.99                                  Immune           >0.99   • Rubeola IgG 02/07/2022 23.5  Immune >16.4 AU/mL Final                                     Negative        <13.5                                   Equivocal 13.5 - 16.4                                   Positive        >16.4  Presence of antibodies to Rubeola is presumptive evidence  of immunity except when acute infection is suspected.   • Varicella IgG 02/07/2022 2353  Immune >165 index Final                                   Negative          <135                                 Equivocal    135 - 165                                 Positive          >165  A positive result generally indicates exposure to the  pathogen or administration of specific immunoglobulins,  but it is not indication of active infection or stage  of disease.   • QUANTIFERON-TB GOLD PLUS 02/07/2022    Final    Test not performed. Specimen is turbid.  Your facility has been notified 02/11/2022  Chemiluminescence immunoassay methodology       EKG Results:  No orders to display       Assessment & Plan   Problems Addressed this Visit        Mental Health    Bipolar II disorder (HCC)    Relevant Medications    ziprasidone (Geodon) 80 MG capsule    traZODone (DESYREL) 100 MG tablet    hydrOXYzine (ATARAX) 10 MG tablet      Other Visit Diagnoses     Insomnia due to mental disorder        Relevant Medications    ziprasidone (Geodon) 80 MG capsule    traZODone (DESYREL) 100 MG tablet    hydrOXYzine (ATARAX) 10 MG tablet      Diagnoses       Codes Comments    Bipolar II disorder (HCC)     ICD-10-CM: F31.81  ICD-9-CM: 296.89      Insomnia due to mental disorder     ICD-10-CM: F51.05  ICD-9-CM: 300.9, 327.02           Visit Diagnoses:    ICD-10-CM ICD-9-CM   1. Bipolar II disorder (HCC)  F31.81 296.89   2. Insomnia due to mental disorder  F51.05 300.9     327.02       GOALS:  Short Term Goals: Patient will be compliant with medication, and patient will have no significant medication related side effects.  Patient will be engaged in psychotherapy as indicated.  Patient will report subjective improvement of symptoms.  Long term goals: To stabilize mood and treat/improve subjective symptoms, the patient will stay out of the hospital, the patient will be at an optimal level of functioning, and the patient will take all medications as prescribed.  The patient/guardian verbalized understanding and agreement with goals that were mutually set.    TREATMENT PLAN: Continue supportive psychotherapy efforts and medications as indicated for patient's diagnosis.  Pharmacological and Non-Pharmacological treatment options discussed during today's visit. Patient/Guardian acknowledged and verbally consented with current treatment plan and was educated on the importance of compliance with treatment and follow-up appointments.      Discussed medication options and treatment plan of prescribed medication as well as the risks, benefits, any black box warnings, and side effects including potential falls, possible impaired driving, and metabolic adversities among others. Patient is agreeable to call the office with any worsening of symptoms or onset of side effects, or if any concerns or questions arise.  The contact information for the office is made available to the patient. Patient is agreeable to call 911 or go to the nearest ER should they begin having any SI/HI, or if any urgent concerns arise. No medication side effects or related complaints today.     -Reviewed previous available documentation and most recent available labs      -GLENN reviewed and is  appropriate.    -Discussed importance of counseling to decrease anxiety like symptoms. He is agreeable to make counseling appt. Discussed coping mechanisms to decrease stress and anxiety: relaxation techniques, guided imagery, music therapy, staying active, support groups, diversional activities and avoid aggravating factors.  Discussed different coping mechanisms to better control depression.    Discussed in detail, the possible side effects of antipsychotic medications including increased cholesterol, increased blood sugar, and the possibility of gaining weight. Also discussed risk of muscle movement disorders with this class of medication. Labs will need to be monitored regularly while taking this type of medication. Discussed need for updated labs.     -Increase Geodon to 80 mg twice daily for depression and mood stabilization  -Continue Hydroxyzine HCL 10 mg to 20 mg three times per day as needed for anxiety and insomnia  -Continue Trazodone 150 mg daily at night for insomnia and depression.  Discussed possibly adding Trileptal to help with mood at follow up visit.     MEDS ORDERED DURING VISIT:  New Medications Ordered This Visit   Medications   • ziprasidone (Geodon) 80 MG capsule     Sig: Take 1 capsule by mouth 2 (Two) Times a Day With Meals for 90 days.     Dispense:  60 capsule     Refill:  2   • traZODone (DESYREL) 100 MG tablet     Sig: Take 1 & 1/2 tablets by mouth Every Night.     Dispense:  45 tablet     Refill:  2   • hydrOXYzine (ATARAX) 10 MG tablet     Sig: Take 1 to 2 tablets by mouth 3 (Three) Times a Day As Needed for Anxiety (insomnia).     Dispense:  60 tablet     Refill:  2       FOLLOW UP:  Return in about 4 weeks (around 8/10/2022) for Recheck.    I spent 30 minutes caring for Mr. Mireles on this date of service. This time includes time spent by me in the following activities: preparing for the visit, obtaining and/or reviewing a separately obtained history, performing a medically  appropriate examination and/or evaluation, counseling and educating the patient/family/caregiver, ordering medications, tests, or procedures and documenting information in the medical record.           This document has been electronically signed by LAUREN Fish  July 13, 2022 14:02 EDT    Please note that portions of this note were completed with a voice recognition program. Efforts were made to edit dictation, but occasionally words are mistranscribed.

## 2022-07-18 RX ORDER — ASPIRIN 81 MG/1
TABLET ORAL
Qty: 90 TABLET | Refills: 1 | Status: SHIPPED | OUTPATIENT
Start: 2022-07-18 | End: 2022-08-07 | Stop reason: SDUPTHER

## 2022-08-03 ENCOUNTER — OFFICE VISIT (OUTPATIENT)
Dept: GASTROENTEROLOGY | Facility: CLINIC | Age: 47
End: 2022-08-03

## 2022-08-03 VITALS
HEART RATE: 88 BPM | BODY MASS INDEX: 37.51 KG/M2 | OXYGEN SATURATION: 96 % | HEIGHT: 70 IN | SYSTOLIC BLOOD PRESSURE: 155 MMHG | WEIGHT: 262 LBS | DIASTOLIC BLOOD PRESSURE: 85 MMHG

## 2022-08-03 DIAGNOSIS — Z12.12 ENCOUNTER FOR COLORECTAL CANCER SCREENING: Primary | ICD-10-CM

## 2022-08-03 DIAGNOSIS — Z12.11 ENCOUNTER FOR COLORECTAL CANCER SCREENING: ICD-10-CM

## 2022-08-03 DIAGNOSIS — Z12.12 ENCOUNTER FOR COLORECTAL CANCER SCREENING: ICD-10-CM

## 2022-08-03 DIAGNOSIS — Z12.11 ENCOUNTER FOR COLORECTAL CANCER SCREENING: Primary | ICD-10-CM

## 2022-08-03 DIAGNOSIS — K59.04 CHRONIC IDIOPATHIC CONSTIPATION: ICD-10-CM

## 2022-08-03 DIAGNOSIS — K59.04 CHRONIC IDIOPATHIC CONSTIPATION: Primary | ICD-10-CM

## 2022-08-03 PROCEDURE — 99214 OFFICE O/P EST MOD 30 MIN: CPT | Performed by: PHYSICIAN ASSISTANT

## 2022-08-03 RX ORDER — PRUCALOPRIDE 2 MG/1
2 TABLET, FILM COATED ORAL DAILY
Qty: 90 TABLET | Refills: 3 | Status: SHIPPED | OUTPATIENT
Start: 2022-08-03 | End: 2022-08-22

## 2022-08-03 RX ORDER — SODIUM, POTASSIUM,MAG SULFATES 17.5-3.13G
1 SOLUTION, RECONSTITUTED, ORAL ORAL TAKE AS DIRECTED
Qty: 354 ML | Refills: 0 | Status: SHIPPED | OUTPATIENT
Start: 2022-08-03 | End: 2022-09-14 | Stop reason: HOSPADM

## 2022-08-03 NOTE — PROGRESS NOTES
Chief Complaint   Patient presents with   • Constipation       Thomas Mireles is a 46 y.o. male who presents to the office today for evaluation of Constipation  .    HPI  Patient presents the clinic today for follow-up of chronic constipation.  He was last seen in clinic roughly 8 months ago in which he was started on Trulance for chronic constipation.  Patient states that he did try taking the Trulance however did suffer from severe diarrhea.  He switch back to the Linzess 145 mcg once daily which also produced diarrhea however not as bad.  Patient states that the diarrhea is extremely bad at times and he will have accidents while sleeping.  He does feel like the Linzess 145 does empty him well as bowel movements are large volume but typically type VII on the [place] stool scale.  He denies any bright red blood per rectum or melena.  He needs to be rescheduled for his screening colonoscopy as he was unable to have it completed last July due to poor colon prep.  He denies any family history of colon cancer or GI cancers.    Review of Systems   Constitutional: Positive for unexpected weight change. Negative for fever.   HENT: Negative for trouble swallowing.    Eyes: Negative.    Respiratory: Negative for chest tightness.    Cardiovascular: Negative for chest pain.   Gastrointestinal: Positive for abdominal distention, abdominal pain, constipation and diarrhea. Negative for anal bleeding, blood in stool, nausea, rectal pain and vomiting.   Endocrine: Negative.    Genitourinary: Negative for difficulty urinating.   Musculoskeletal: Positive for back pain.   Skin: Negative.    Allergic/Immunologic: Negative for environmental allergies and food allergies.   Neurological: Negative for headaches.   Hematological: Does not bruise/bleed easily.   Psychiatric/Behavioral: Negative.        ACTIVE PROBLEMS:   Specialty Problems        Gastroenterology Problems    Chronic idiopathic constipation              PAST MEDICAL  HISTORY:  Past Medical History:   Diagnosis Date   • Anxiety    • Bipolar 1 disorder (HCC)    • Depression    • Head injury    • HL (hearing loss)    • Hyperlipidemia    • Hypertension    • Kidney stone 17   • Migraine    • Obesity    • Psychiatric illness    • Type 2 diabetes mellitus (HCC)        SURGICAL HISTORY:  Past Surgical History:   Procedure Laterality Date   • COLONOSCOPY N/A 2021    Procedure: COLONOSCOPY;  Surgeon: Judy Grant MD;  Location[facility];  Service: Gastroenterology;  Laterality: N/A;   • KNEE SURGERY      right   • NASAL POLYP EXCISION     • URETHRAL DILATATION      infant       FAMILY HISTORY:  Family History   Problem Relation Age of Onset   • Hypertension Mother    • Hypertension Father    • Diabetes Maternal Grandfather    • Heart disease Maternal Grandfather    • Mental illness Maternal Grandfather         PTSD   • Liver cancer Paternal Grandfather    • Asthma Paternal Grandfather          in .   • Diabetes Paternal Grandfather        SOCIAL HISTORY:  Social History     Tobacco Use   • Smoking status: Former Smoker     Packs/day: 2.00     Years: 15.00     Pack years: 30.00     Start date: 1993     Quit date: 3/6/2009     Years since quittin.4   • Smokeless tobacco: Never Used   Substance Use Topics   • Alcohol use: Yes     Alcohol/week: 1.0 standard drink     Types: 1 Cans of beer per week     Comment: Very seldom.       CURRENT MEDICATION:    Current Outpatient Medications:   •  Accu-Chek Guide test strip, USE AS DIRECTED EVERY DAY, Disp: 60 each, Rfl: 11  •  Accu-Chek Softclix Lancets lancets, USE TO CHECK BLOOD GLUCOSE EVERY DAY, Disp: 100 each, Rfl: 1  •  aspirin 81 MG EC tablet, TAKE 1 TABLET BY MOUTH DAILY, Disp: 90 tablet, Rfl: 1  •  cyclobenzaprine (FLEXERIL) 5 MG tablet, TAKE 1 TABLET BY MOUTH AT NIGHT AS NEEDED FOR MUSCLE SPASMS, Disp: 90 tablet, Rfl: 1  •  Dulaglutide (Trulicity) 1.5 MG/0.5ML solution pen-injector,  Inject 1.5 mg under the skin into the appropriate area as directed 1 (One) Time Per Week., Disp: 9 mL, Rfl: 1  •  fenofibrate (TRICOR) 145 MG tablet, Take 1 tablet by mouth Daily., Disp: 90 tablet, Rfl: 1  •  glucose blood test strip, For daily testing  E11.65, Disp: 100 each, Rfl: 11  •  glucose monitor monitoring kit, 1 each As Needed (use to check sugar once daily)., Disp: 1 each, Rfl: 0  •  hydrOXYzine (ATARAX) 10 MG tablet, Take 1 to 2 tablets by mouth 3 (Three) Times a Day As Needed for Anxiety (insomnia)., Disp: 60 tablet, Rfl: 2  •  lisinopril (PRINIVIL,ZESTRIL) 10 MG tablet, Take 1 tablet by mouth Daily., Disp: 90 tablet, Rfl: 1  •  metoprolol tartrate (LOPRESSOR) 25 MG tablet, Take 1 tablet by mouth Every 12 (Twelve) Hours., Disp: 180 tablet, Rfl: 1  •  metroNIDAZOLE (METROGEL) 0.75 % gel, Apply  topically to the appropriate area as directed 2 (Two) Times a Day., Disp: 45 g, Rfl: 5  •  naproxen (Naprosyn) 500 MG tablet, Take 1 tablet by mouth 2 (Two) Times a Day With Meals., Disp: 180 tablet, Rfl: 1  •  Omega-3 Fatty Acids (fish oil) 1000 MG capsule capsule, Take 1 capsule by mouth 2 (Two) Times a Day With Meals., Disp: 180 capsule, Rfl: 1  •  pantoprazole (PROTONIX) 40 MG EC tablet, , Disp: , Rfl:   •  traZODone (DESYREL) 100 MG tablet, Take 1 & 1/2 tablets by mouth Every Night., Disp: 45 tablet, Rfl: 2  •  triamcinolone (KENALOG) 0.1 % cream, Apply 1 application topically to the appropriate area as directed 2 (Two) Times a Day., Disp: 80 g, Rfl: 2  •  ziprasidone (Geodon) 80 MG capsule, Take 1 capsule by mouth 2 (Two) Times a Day With Meals., Disp: 60 capsule, Rfl: 2  •  Prucalopride Succinate (Motegrity) 2 MG tablet, Take 1 tablet by mouth Daily., Disp: 90 tablet, Rfl: 3  •  sodium-potassium-magnesium sulfates (SUPREP) 17.5-3.13-1.6 GM/177ML solution oral solution, Take 1 bottle by mouth Take As Directed for 2 doses. Take one bottle with 16 oz of water. Then within the hour drink an additional 32oz of  "water., Disp: 354 mL, Rfl: 0    ALLERGIES:  Statins, Zofran [ondansetron], and Zoloft [sertraline hcl]    VISIT VITALS:  /85   Pulse 88   Ht 177.8 cm (70\")   Wt 119 kg (262 lb)   SpO2 96%   BMI 37.59 kg/m²   Physical Exam  Constitutional:       General: He is not in acute distress.     Appearance: Normal appearance. He is well-developed.   HENT:      Head: Normocephalic and atraumatic.   Eyes:      Pupils: Pupils are equal, round, and reactive to light.   Cardiovascular:      Rate and Rhythm: Normal rate and regular rhythm.      Heart sounds: Normal heart sounds.   Pulmonary:      Effort: Pulmonary effort is normal. No respiratory distress.      Breath sounds: Normal breath sounds. No wheezing, rhonchi or rales.   Abdominal:      General: Abdomen is flat. Bowel sounds are normal. There is no distension.      Palpations: Abdomen is soft. There is no mass.      Tenderness: There is no abdominal tenderness. There is no guarding or rebound.      Hernia: No hernia is present.   Musculoskeletal:         General: No swelling. Normal range of motion.      Cervical back: Normal range of motion and neck supple.      Right lower leg: No edema.      Left lower leg: No edema.   Skin:     General: Skin is warm and dry.   Neurological:      Mental Status: He is alert and oriented to person, place, and time.   Psychiatric:         Attention and Perception: Attention normal.         Mood and Affect: Mood normal.         Speech: Speech normal.         Behavior: Behavior normal. Behavior is cooperative.         Thought Content: Thought content normal.         Assessment    Due to the patient's symptoms-we will go ahead and discontinue Linzess 145 mcg once daily as it is still causing diarrhea.  Trulance was also not effective and caused severe diarrhea as well.  I will get him started on Motegrity once daily for chronic constipation.  Patient will also be rescheduled for his colonoscopy-he tried having previous one done " last year however colon prep was not adequate.  Procedure was thoroughly explained to the patient he voiced understanding and agreement to the treatment plan.   Diagnosis Plan   1. Chronic idiopathic constipation  Prucalopride Succinate (Motegrity) 2 MG tablet    sodium-potassium-magnesium sulfates (SUPREP) 17.5-3.13-1.6 GM/177ML solution oral solution    Case Request    Case Request   2. Encounter for colorectal cancer screening  Case Request    Case Request       Return After procedure.                   This document has been electronically signed by Gregg Navarro PA-C  August 3, 2022 09:21 EDT    Part of this note may be an electronic transcription/translation of spoken language to printed text using the Dragon Dictation System.

## 2022-08-07 DIAGNOSIS — R51.9 CHRONIC DAILY HEADACHE: ICD-10-CM

## 2022-08-07 DIAGNOSIS — M25.50 ARTHRALGIA, UNSPECIFIED JOINT: ICD-10-CM

## 2022-08-08 RX ORDER — ASPIRIN 81 MG/1
81 TABLET ORAL DAILY
Qty: 90 TABLET | Refills: 1 | Status: SHIPPED | OUTPATIENT
Start: 2022-08-08 | End: 2023-02-21 | Stop reason: SDUPTHER

## 2022-08-08 RX ORDER — CYCLOBENZAPRINE HCL 5 MG
5 TABLET ORAL NIGHTLY PRN
Qty: 90 TABLET | Refills: 1 | Status: SHIPPED | OUTPATIENT
Start: 2022-08-08 | End: 2023-02-21 | Stop reason: SDUPTHER

## 2022-08-17 NOTE — PROGRESS NOTES
Subjective   Thomas Mireles is a 46 y.o. male who presents today for initial evaluation     Chief Complaint:  Bipolar disorder, insomnia    History of Present Illness:   Thomas Mireles is a 46 y.o. male who presents today for medication management follow up at the [facility] for initial visit after being transferred from [facility] for continuation of treatment. He states he is doing ok on current medications. He is having less outbursts. He states irritability is significantly less. He has had problems with depression, and mood, almost all of his life. Previous psychiatric hospitalizations: Yes, Twice, 2017, 2021 (he stopped taking all medications). Generally he doesn't sleep well on work days, but he catches up on his off days, he works 12 hour shifts and gets up at 3 am on work days. Appetite is good.   Previous self harm behaviors: Yes, 2021, was last self harm incident. He was admitted in [place], [place], He currently employed at [employer], He is single, never been , no children. Currently living with parents. He states he occasionally drinks alcohol socially, denies other drug use.   Depression rated 4/10, anxiety rated 3/10, with 10 being the worst. Denies SI/HI/AVH.  Denies thoughts of self-harm. PCP is Kaycee Aleman, last saw a couple of months ago.  He has labs ordered from PCP, he plans on completing those soon.          The following portions of the patient's history were reviewed and updated as appropriate: allergies, current medications, past family history, past medical history, past social history, past surgical history and problem list.      Past Medical History:  Past Medical History:   Diagnosis Date   • Anxiety    • Bipolar 1 disorder (HCC)    • Depression    • Head injury 2016   • HL (hearing loss)    • Hyperlipidemia    • Hypertension    • Kidney stone 01/25/18   • Migraine    • Obesity    • Psychiatric illness    • Type 2 diabetes mellitus (HCC)        Social History:  Social  History     Socioeconomic History   • Marital status: Single   Tobacco Use   • Smoking status: Former Smoker     Packs/day: 2.00     Years: 15.00     Pack years: 30.00     Start date: 1993     Quit date: 3/6/2009     Years since quittin.4   • Smokeless tobacco: Never Used   Vaping Use   • Vaping Use: Never used   Substance and Sexual Activity   • Alcohol use: Yes     Alcohol/week: 1.0 standard drink     Types: 1 Cans of beer per week     Comment: Very seldom.   • Drug use: No   • Sexual activity: Not Currently     Partners: Female     Birth control/protection: Condom       Family History:  Family History   Problem Relation Age of Onset   • Hypertension Mother    • Hypertension Father    • Diabetes Maternal Grandfather    • Heart disease Maternal Grandfather    • Mental illness Maternal Grandfather         PTSD   • Liver cancer Paternal Grandfather    • Asthma Paternal Grandfather          in .   • Diabetes Paternal Grandfather        Past Surgical History:  Past Surgical History:   Procedure Laterality Date   • COLONOSCOPY N/A 2021    Procedure: COLONOSCOPY;  Surgeon: Judy Grant MD;  Location: [facility];  Service: Gastroenterology;  Laterality: N/A;   • KNEE SURGERY      right   • NASAL POLYP EXCISION     • URETHRAL DILATATION      infant       Problem List:  Patient Active Problem List   Diagnosis   • Hypertension   • Hyperlipidemia   • Type 2 diabetes mellitus with hyperglycemia (HCC)   • Bipolar II disorder (HCC)   • Rosacea   • Abnormal EKG   • Old myocardial infarction by EKG criteria.   • Dyspnea on exertion (class 2-3)   • Head injury   • Chronic idiopathic constipation       Allergy:   Allergies   Allergen Reactions   • Statins Other (See Comments)     All over pain,    • Zofran [Ondansetron] Nausea And Vomiting   • Zoloft [Sertraline Hcl] Nausea And Vomiting        Current Medications:   Current Outpatient Medications   Medication Sig Dispense Refill   •  Accu-Chek Guide test strip USE AS DIRECTED EVERY DAY 60 each 11   • Accu-Chek Softclix Lancets lancets USE TO CHECK BLOOD GLUCOSE EVERY  each 1   • aspirin (aspirin) 81 MG EC tablet Take 1 tablet by mouth Daily. 90 tablet 1   • cyclobenzaprine (FLEXERIL) 5 MG tablet Take 1 tablet by mouth At Night As Needed for Muscle Spasms. 90 tablet 1   • Dulaglutide (Trulicity) 1.5 MG/0.5ML solution pen-injector Inject 1.5 mg under the skin into the appropriate area as directed 1 (One) Time Per Week. 9 mL 1   • fenofibrate (TRICOR) 145 MG tablet Take 1 tablet by mouth Daily. 90 tablet 1   • glucose blood test strip For daily testing  E11.65 100 each 11   • glucose monitor monitoring kit 1 each As Needed (use to check sugar once daily). 1 each 0   • hydrOXYzine (ATARAX) 10 MG tablet Take 1 to 2 tablets by mouth 3 (Three) Times a Day As Needed for Anxiety (insomnia). 60 tablet 2   • lisinopril (PRINIVIL,ZESTRIL) 10 MG tablet Take 1 tablet by mouth Daily. 90 tablet 1   • lubiprostone (AMITIZA) 24 MCG capsule Take 1 capsule by mouth 2 (Two) Times a Day With Meals. 60 capsule 11   • metoprolol tartrate (LOPRESSOR) 25 MG tablet Take 1 tablet by mouth Every 12 (Twelve) Hours. 180 tablet 1   • metroNIDAZOLE (METROGEL) 0.75 % gel Apply  topically to the appropriate area as directed 2 (Two) Times a Day. 45 g 5   • naproxen (Naprosyn) 500 MG tablet Take 1 tablet by mouth 2 (Two) Times a Day With Meals. 180 tablet 1   • Omega-3 Fatty Acids (fish oil) 1000 MG capsule capsule Take 1 capsule by mouth 2 (Two) Times a Day With Meals. 180 capsule 1   • pantoprazole (PROTONIX) 40 MG EC tablet      • sodium-potassium-magnesium sulfates (SUPREP) 17.5-3.13-1.6 GM/177ML solution oral solution Take 1 bottle by mouth Take As Directed for 2 doses. Take one bottle with 16 oz of water. Then within the hour drink an additional 32oz of water. 354 mL 0   • traZODone (DESYREL) 100 MG tablet Take 1 & 1/2 tablets by mouth Every Night. 45 tablet 2   •  "triamcinolone (KENALOG) 0.1 % cream Apply 1 application topically to the appropriate area as directed 2 (Two) Times a Day. 80 g 2   • ziprasidone (Geodon) 80 MG capsule Take 1 capsule by mouth 2 (Two) Times a Day With Meals. 60 capsule 2     No current facility-administered medications for this visit.       Review of Symptoms:    Review of Systems   Psychiatric/Behavioral: Positive for dysphoric mood and depressed mood. Negative for hallucinations and suicidal ideas. The patient is nervous/anxious.    All other systems reviewed and are negative.      Objective   Physical Exam:   Blood pressure 134/86, pulse 67, height 177.8 cm (70\"), weight 121 kg (267 lb).  Body mass index is 38.31 kg/m².    Appearance:  male appears stated age, no acute distress noted.    Gait, Station, Strength: Steady, posture erect, WNL      Mental Status Exam: 8/24/22  Hygiene:   good  Cooperation:  Cooperative  Eye Contact:  Good  Psychomotor Behavior:  Appropriate  Affect:  Appropriate  Mood: normal  Hopelessness: Denies  Speech:  Normal  Thought Process:  Linear  Thought Content:  Normal  Suicidal:  None  Homicidal:  None  Hallucinations:  None  Delusion:  None  Memory:  Intact  Orientation:  Person, Place, Time and Situation  Reliability:  fair  Insight:  Fair  Judgement:  Fair  Impulse Control:  Fair       PHQ-Score Total:  PHQ-9 Total Score: 1    Lab Results:   No visits with results within 1 Month(s) from this visit.   Latest known visit with results is:   Lab Requisition on 02/16/2022   Component Date Value Ref Range Status   • QuantiFERON Incubation 02/16/2022 Incubation performed.   Final   • QUANTIFERON-TB GOLD PLUS 02/16/2022 Negative  Negative Final    Chemiluminescence immunoassay methodology   • QuantiFERON Criteria 02/16/2022 Comment   Final    The QuantiFERON-TB Gold Plus result is determined by subtracting  the Nil value from either TB antigen (Ag) tube. The mitogen tube  serves as a control for the test.   • " QUANTIFERON TB1 AG VALUE 2022 0.15  IU/mL Final   • QUANTIFERON TB2 AG VALUE 2022 0.16  IU/mL Final   • QuantiFERON Nil Value 2022 0.16  IU/mL Final   • QuantiFERON Mitogen Value 2022 >10.00  IU/mL Final       Assessment & Plan   Problems Addressed this Visit        Mental Health    Bipolar II disorder (HCC) - Primary    Relevant Medications    ziprasidone (Geodon) 80 MG capsule    traZODone (DESYREL) 100 MG tablet    hydrOXYzine (ATARAX) 10 MG tablet      Other Visit Diagnoses     Other insomnia        Medication management        Insomnia due to mental disorder        Relevant Medications    ziprasidone (Geodon) 80 MG capsule    traZODone (DESYREL) 100 MG tablet    hydrOXYzine (ATARAX) 10 MG tablet      Diagnoses       Codes Comments    Bipolar II disorder (HCC)    -  Primary ICD-10-CM: F31.81  ICD-9-CM: 296.89     Other insomnia     ICD-10-CM: G47.09  ICD-9-CM: 780.52     Medication management     ICD-10-CM: Z79.899  ICD-9-CM: V58.69     Insomnia due to mental disorder     ICD-10-CM: F51.05  ICD-9-CM: 300.9, 327.02         Social History     Tobacco Use   Smoking Status Former Smoker   • Packs/day: 2.00   • Years: 15.00   • Pack years: 30.00   • Start date: 1993   • Quit date: 3/6/2009   • Years since quittin.4   Smokeless Tobacco Never Used     GLENN reviewed and appropriate. Patient counseled on use of controlled substances.         -The benefits of a healthy diet and exercise were discussed with patient, especially the positive effects they have on mental health. Patient encouraged to consider lifestyle modification regarding  diet and exercise patterns to maximize results of mental health treatment.  -Reviewed previous available documentation  -Reviewed most recent available labs   -Lengthy discussion with patient on the possible side effects of antipsychotic medications including increased cholesterol, increased blood sugar, and possibility of weight gain.  Also discussed  the need to monitor lab work associated with this.  The risk of muscle movement disorders with this class of medication was also discussed.      Visit Diagnoses:    ICD-10-CM ICD-9-CM   1. Bipolar II disorder (HCC)  F31.81 296.89   2. Other insomnia  G47.09 780.52   3. Medication management  Z79.899 V58.69   4. Insomnia due to mental disorder  F51.05 300.9     327.02         TREATMENT PLAN/GOALS: Continue supportive psychotherapy efforts and medications as indicated. Treatment and medication options discussed during today's visit. Patient acknowledged and verbally consented to continue with current treatment plan and was educated on the importance of compliance with treatment and follow-up appointments.    MEDICATION ISSUES:  Discussed medication options and treatment plan of prescribed medication as well as the risks, benefits, and side effects including potential falls, possible impaired driving and metabolic adversities among others. Patient is agreeable to call the office with any worsening of symptoms or onset of side effects. Patient is agreeable to call 911 or go to the nearest ER should he/she begin having SI/HI.     MEDS ORDERED DURING VISIT:  New Medications Ordered This Visit   Medications   • ziprasidone (Geodon) 80 MG capsule     Sig: Take 1 capsule by mouth 2 (Two) Times a Day With Meals.     Dispense:  60 capsule     Refill:  2   • traZODone (DESYREL) 100 MG tablet     Sig: Take 1 & 1/2 tablets by mouth Every Night.     Dispense:  45 tablet     Refill:  2   • hydrOXYzine (ATARAX) 10 MG tablet     Sig: Take 1 to 2 tablets by mouth 3 (Three) Times a Day As Needed for Anxiety (insomnia).     Dispense:  60 tablet     Refill:  2       Return in about 3 months (around 11/24/2022).     -Continue Geodon to 80 mg twice daily for depression and mood stabilization  -Continue Hydroxyzine HCL 10 mg to 20 mg three times per day as needed for anxiety and insomnia  -Continue Trazodone 150 mg daily at night for  insomnia and depression.       Prognosis: Guarded dependent on medication/follow up and treatment plan compliance.  Functionality: pt showing improvements in important areas of daily functioning.     Short-term goals: Patient will adhere to medication regimen and note continued improvement in symptoms over the next 3 months.   Long-term goals: Patient will be adherent to medication management and psychotherapy with continued improvement in symptoms over the next 6 months        This document has been electronically signed by LAUREN Roberts   August 24, 2022 13:25 EDT    Part of this note may be an electronic transcription/translation of spoken language to printed text using the Dragon Dictation System.

## 2022-08-22 ENCOUNTER — TELEPHONE (OUTPATIENT)
Dept: UROLOGY | Facility: CLINIC | Age: 47
End: 2022-08-22

## 2022-08-22 DIAGNOSIS — K59.04 CHRONIC IDIOPATHIC CONSTIPATION: Primary | ICD-10-CM

## 2022-08-22 RX ORDER — LUBIPROSTONE 24 UG/1
24 CAPSULE ORAL 2 TIMES DAILY WITH MEALS
Qty: 60 CAPSULE | Refills: 11 | Status: SHIPPED | OUTPATIENT
Start: 2022-08-22

## 2022-08-22 NOTE — TELEPHONE ENCOUNTER
The Motegrity we gave him isn't working. He wanted to see if there is anything else we can give him?

## 2022-08-24 ENCOUNTER — OFFICE VISIT (OUTPATIENT)
Dept: PSYCHIATRY | Facility: CLINIC | Age: 47
End: 2022-08-24

## 2022-08-24 VITALS
DIASTOLIC BLOOD PRESSURE: 86 MMHG | BODY MASS INDEX: 38.22 KG/M2 | WEIGHT: 267 LBS | HEART RATE: 67 BPM | SYSTOLIC BLOOD PRESSURE: 134 MMHG | HEIGHT: 70 IN

## 2022-08-24 DIAGNOSIS — Z79.899 MEDICATION MANAGEMENT: ICD-10-CM

## 2022-08-24 DIAGNOSIS — F31.81 BIPOLAR II DISORDER: Primary | ICD-10-CM

## 2022-08-24 DIAGNOSIS — F51.05 INSOMNIA DUE TO MENTAL DISORDER: ICD-10-CM

## 2022-08-24 DIAGNOSIS — G47.09 OTHER INSOMNIA: ICD-10-CM

## 2022-08-24 PROCEDURE — 99214 OFFICE O/P EST MOD 30 MIN: CPT | Performed by: NURSE PRACTITIONER

## 2022-08-24 RX ORDER — TRAZODONE HYDROCHLORIDE 100 MG/1
150 TABLET ORAL NIGHTLY
Qty: 45 TABLET | Refills: 2 | Status: SHIPPED | OUTPATIENT
Start: 2022-08-24 | End: 2022-10-31 | Stop reason: SDUPTHER

## 2022-08-24 RX ORDER — HYDROXYZINE HYDROCHLORIDE 10 MG/1
10-20 TABLET, FILM COATED ORAL 3 TIMES DAILY PRN
Qty: 60 TABLET | Refills: 2 | Status: SHIPPED | OUTPATIENT
Start: 2022-08-24 | End: 2022-10-31 | Stop reason: SDUPTHER

## 2022-08-24 RX ORDER — ZIPRASIDONE HYDROCHLORIDE 80 MG/1
80 CAPSULE ORAL 2 TIMES DAILY WITH MEALS
Qty: 60 CAPSULE | Refills: 2 | Status: SHIPPED | OUTPATIENT
Start: 2022-08-24 | End: 2022-10-31 | Stop reason: SDUPTHER

## 2022-08-26 NOTE — TELEPHONE ENCOUNTER
Fax received stating Trulicity requires PA. I spoke with pharmacy and it does require PA, pt has recently changed insurance. Do you want to PA this or change medication? Please advise.    98.4

## 2022-08-31 ENCOUNTER — OFFICE VISIT (OUTPATIENT)
Dept: PSYCHIATRY | Facility: CLINIC | Age: 47
End: 2022-08-31

## 2022-08-31 DIAGNOSIS — F41.0 ANXIETY ATTACK: ICD-10-CM

## 2022-08-31 DIAGNOSIS — R45.89 SUICIDAL RISK: ICD-10-CM

## 2022-08-31 DIAGNOSIS — F41.1 GAD (GENERALIZED ANXIETY DISORDER): ICD-10-CM

## 2022-08-31 DIAGNOSIS — F31.81 BIPOLAR II DISORDER: Primary | ICD-10-CM

## 2022-08-31 PROCEDURE — 90791 PSYCH DIAGNOSTIC EVALUATION: CPT | Performed by: COUNSELOR

## 2022-08-31 NOTE — TREATMENT PLAN
Multi-Disciplinary Problems (from Behavioral Health Treatment Plan)    Active Problems     Problem: Anger  Start Date: 08/31/22    Problem Details: The patient self-scales this problem as a 10 with 10 being the worst.        Goal Priority Start Date Expected End Date End Date    Patient will develop specific, socially acceptable way to manage anger. -- 08/31/22 -- --    Goal Details: Progress toward goal:  Not appropriate to rate progress toward goal since this is the initial treatment plan.        Goal Intervention Frequency Start Date End Date    Process patient's angry feelings or outbursts that have recently occurred and review alternative behaviors Q2 Weeks 08/31/22 --    Intervention Details: Duration of treatment until until remission of symptoms.        Goal Intervention Frequency Start Date End Date    Work with patient to develop constructive way to handle anger. Q2 Weeks 08/31/22 --    Intervention Details: Duration of treatment until until remission of symptoms.              Problem: Anxiety  Start Date: 08/31/22    Problem Details: The patient self-scales this problem as a 10 with 10 being the worst.        Goal Priority Start Date Expected End Date End Date    Patient will develop and implement behavioral and cognitive strategies to reduce anxiety and irrational fears. -- 08/31/22 -- --    Goal Details: Progress toward goal:  Not appropriate to rate progress toward goal since this is the initial treatment plan.        Goal Intervention Frequency Start Date End Date    Help patient explore past emotional issues in relation to present anxiety. Q2 Weeks 08/31/22 --    Intervention Details: Duration of treatment until until remission of symptoms.        Goal Intervention Frequency Start Date End Date    Help patient develop an awareness of their cognitive and physical responses to anxiety. Q2 Weeks 08/31/22 --    Intervention Details: Duration of treatment until until remission of symptoms.               Problem: Mood Instability  Start Date: 08/31/22    Problem Details: The patient self-scales this problem as a 10 with 10 being the worst.        Goal Priority Start Date Expected End Date End Date    Patient will achieve mood stability as evidenced by controlled behavior and a more deliberate thought process -- 08/31/22 -- --    Goal Details: Progress toward goal:  Not appropriate to rate progress toward goal since this is the initial treatment plan.        Goal Intervention Frequency Start Date End Date    Provide structure and focus to patient's thoughts and actions by establishing plans and routine. Q2 Weeks 08/31/22 --    Intervention Details: Duration of treatment until until remission of symptoms.        Goal Intervention Frequency Start Date End Date    Assist patient in setting responsible goals and limits in behavior. Q2 Weeks 08/31/22 --    Intervention Details: Duration of treatment until until remission of symptoms.                           I have discussed and reviewed this treatment plan with the patient.  It has been printed for signatures.

## 2022-08-31 NOTE — PLAN OF CARE
Problem: Anger  Goal: Patient will develop specific, socially acceptable way to manage anger.  Outcome: Ongoing, Progressing     Problem: Anxiety  Goal: Patient will develop and implement behavioral and cognitive strategies to reduce anxiety and irrational fears.  Outcome: Ongoing, Progressing     Problem: Mood Instability  Goal: Patient will achieve mood stability as evidenced by controlled behavior and a more deliberate thought process  Outcome: Ongoing, Progressing

## 2022-08-31 NOTE — PROGRESS NOTES
"[facility]  Outpatient Progress Note  Patient Status:  Established - New to therapist  Name:  Thomas Mireles  Date of Service: August 31, 2022  Time In: 14:40 EDT  Time Out: 3:48 pm    Identifying Information: Thomas Mireles w a 46 y.o. male presenting to [facility] for an initial contact therapy session by Gia Pedro, BRANDT -S, NCC, CAMS-II.      Chief Compliant: Thomas Mireles seeks admission for outpatient behavioral - Establish Care    HPI:  Patient arrived for session on time, clean and casually dressed without evidence of intoxication, withdrawal, or perceptual disturbance.   Patient arrived as: age appropriate and wearing shorts and a t-shirt. Patient indicates male is an open and willing participant in today's session.  Patient is referred by his employer [employer] factor experiencing an outburst at work.  He shares that the dosage of Geodon stopped working which resulted in him becoming irritated resulting in him hitting a piece of equipment.  This occurred in July 2022.  This patient provided information for the Biopsychosocial Assessment and Medical/Psychiatric History.   Patient observed to have calm and pleasant affect and euthymic mood.     Patient reports experiencing the following symptoms of anxiety over the past two weeks: Feeling nervous, anxious or on edge, Unable to stop or control worrying, Worries too much about different things, Trouble relaxing, Feeling restless and finds it difficult to sit still, Easily annoyed and/or irritable, Feeling afraid as if something awful might happen and finds it Somewhat difficult to navigate significant areas of daily life.      Patient currently rates the severity of anxiety symptoms, on a scale of 1-10 (10 is the most severe), a 7. The symptoms are reported as: remained the same.  Patient shares that he has noticed an increase in worry that he is going to \"screw up at work\".  He shares that " "it seems as if this has been present for \"a long time\".  Patient noticed that after he graduated from high school, he started to experience anxiety attacks.  However, he shares that he was able to ignore them because they were \"steady and manageable\".  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  He tells me his anxiety was \"okay\" and tell he experienced a bipolar mom disorder or vazquez in 2003.  This resulted in him failing the program.  He had reenrolled later.  Patient shares that when he is manic he experiences Grandiose, Increased activity/Goal directed/High Risk, Decreased Judgment, Distractible, Irritability, Needs less sleep, Elevated mood, Speedy talking, Speedy thoughts, Impulsivity and Not taking medications as prescribed    Patient admits to past suicidality.  He states that he experienced \"the most masochistic suicide for 7 years.  He tells me that it may have been triggered by his last vazquez.  He has been treated inpatient twice (2014/2021).  He tells me that his first inpatient was in 2017.  He shares that he spent time in the [facility] and was discharged once crisis was de-escalated.  Patient also was treated inpatient in February 2021 at [facility].  He spent 7 days prior to discharge.  He tells me that he went to the [facility] because of him working at [employer].    Patient reports experiecing the following symptoms of depression within the past two weeks: little interest or pleasure in doing things (anhedonia), feeling down/depressed, sleep impairment; finds it hard to fall asleep, finds it hard to stay asleep and sleeps too much, lethargic, overeating, feels like a failure and has let self and/or family down, trouble concentrating; loses interest easily, feels bad about self , moving or speaking so slowly that other people could have noticed and finds it Somewhat difficult to navigate significant areas of daily " life.  Patient currently rates the severity of depressive symptoms, on a scale of 1-10 (10 is the most severe), a 6.  The symptoms are reported as: remained the same.     It is highly recommended this individual follow up with a PCP to rule out any medical issues.  Patient adamantly and convincingly denies having SI/HI with or without intent, plans, or means. Patient denies having Hallucinations/Illusions and Delusions.  Patient does not appear to be malingering.     Past Treatment: Patient tells me that after his admission at the [facility] in 2017, he saw Kaycee Grigsby at [facility] in [facility] until March 2018.  He shares that he was dropped as a patient but does not know why.  All of his inquiries have gone unanswered.  Patient also states that he has engaged with Wes Weiss LCSW at [facility] and saw LAUREN Berg at [facility].    Somatic Symptoms:  Patient reports experiencing the following somatic symptoms over the last 4 weeks: Pt endorses stomach pains, feeling tired or having little energy, trouble falling or staying asleep or sleeping too much.  , constipation, loose bowels, or diarrhea.  , nausea, gas, or indigestions.  and Sleeps an average of 6 to 7 hours.  This is dependent on whether or not he is working.  He shares that when he is not working he sleeps up to 12 hours at..  It is highly recommended this individual follow-up with the identified PCP to rest any medical concerns.    Substance Use: denied. Last reported use:   • Nicotine use:  no  Pt endorses former use.  Patient is aware various alternatives are available to help and is not interested in smoking cessation at this time.     PHQ-9:Total Score: 14 (10-14 = Moderate depression)  LUIS 7: Total Score: 7 (5-9 = Mild anxiety)    Objective    Past Medical History:   Diagnosis Date   • Anxiety    • Bipolar 1 disorder (HCC)    • Depression    • Head injury 2016   •  HL (hearing loss)    • Hyperlipidemia    • Hypertension    • Kidney stone 01/25/18   • Migraine    • Obesity    • Psychiatric illness    • Type 2 diabetes mellitus (HCC)         Past Surgical History:   Procedure Laterality Date   • COLONOSCOPY N/A 7/27/2021    Procedure: COLONOSCOPY;  Surgeon: Jduy Grant MD;  Location: [facility];  Service: Gastroenterology;  Laterality: N/A;   • KNEE SURGERY  1992    right   • NASAL POLYP EXCISION  2009   • URETHRAL DILATATION      infant      Current Outpatient Medications:   •  Accu-Chek Guide test strip, USE AS DIRECTED EVERY DAY, Disp: 60 each, Rfl: 11  •  Accu-Chek Softclix Lancets lancets, USE TO CHECK BLOOD GLUCOSE EVERY DAY, Disp: 100 each, Rfl: 1  •  aspirin (aspirin) 81 MG EC tablet, Take 1 tablet by mouth Daily., Disp: 90 tablet, Rfl: 1  •  cyclobenzaprine (FLEXERIL) 5 MG tablet, Take 1 tablet by mouth At Night As Needed for Muscle Spasms., Disp: 90 tablet, Rfl: 1  •  Dulaglutide (Trulicity) 1.5 MG/0.5ML solution pen-injector, Inject 1.5 mg under the skin into the appropriate area as directed 1 (One) Time Per Week., Disp: 9 mL, Rfl: 1  •  fenofibrate (TRICOR) 145 MG tablet, Take 1 tablet by mouth Daily., Disp: 90 tablet, Rfl: 1  •  glucose blood test strip, For daily testing  E11.65, Disp: 100 each, Rfl: 11  •  glucose monitor monitoring kit, 1 each As Needed (use to check sugar once daily)., Disp: 1 each, Rfl: 0  •  hydrOXYzine (ATARAX) 10 MG tablet, Take 1 to 2 tablets by mouth 3 (Three) Times a Day As Needed for Anxiety (insomnia)., Disp: 60 tablet, Rfl: 2  •  lisinopril (PRINIVIL,ZESTRIL) 10 MG tablet, Take 1 tablet by mouth Daily., Disp: 90 tablet, Rfl: 1  •  lubiprostone (AMITIZA) 24 MCG capsule, Take 1 capsule by mouth 2 (Two) Times a Day With Meals., Disp: 60 capsule, Rfl: 11  •  metoprolol tartrate (LOPRESSOR) 25 MG tablet, Take 1 tablet by mouth Every 12 (Twelve) Hours., Disp: 180 tablet, Rfl: 1  •  metroNIDAZOLE (METROGEL) 0.75 % gel, Apply   "topically to the appropriate area as directed 2 (Two) Times a Day., Disp: 45 g, Rfl: 5  •  naproxen (Naprosyn) 500 MG tablet, Take 1 tablet by mouth 2 (Two) Times a Day With Meals., Disp: 180 tablet, Rfl: 1  •  Omega-3 Fatty Acids (fish oil) 1000 MG capsule capsule, Take 1 capsule by mouth 2 (Two) Times a Day With Meals., Disp: 180 capsule, Rfl: 1  •  pantoprazole (PROTONIX) 40 MG EC tablet, , Disp: , Rfl:   •  sodium-potassium-magnesium sulfates (SUPREP) 17.5-3.13-1.6 GM/177ML solution oral solution, Take 1 bottle by mouth Take As Directed for 2 doses. Take one bottle with 16 oz of water. Then within the hour drink an additional 32oz of water., Disp: 354 mL, Rfl: 0  •  traZODone (DESYREL) 100 MG tablet, Take 1 & 1/2 tablets by mouth Every Night., Disp: 45 tablet, Rfl: 2  •  triamcinolone (KENALOG) 0.1 % cream, Apply 1 application topically to the appropriate area as directed 2 (Two) Times a Day., Disp: 80 g, Rfl: 2  •  ziprasidone (Geodon) 80 MG capsule, Take 1 capsule by mouth 2 (Two) Times a Day With Meals., Disp: 60 capsule, Rfl: 2   • Patient indicates compliance with medication regimen; Side Effects reported:  no.  Explain:    Subjective    Personal History:  The patient is a 46 y.o. year old, male who lives in [place] with his parents.  Patient is single, never  and is not currently in a relationship.  Patient shares that he was born and raised in [place] and graduated from [employer] in 1993.  Patient has 1 older sister (51-Patricia).  He states that they are \"slight sort of close\".  He tells me that they have a history of \"butting heads\".  Patient works full-time at [employer].  He works as a  in the kitchen.  He has been employed for 3 years.  His shift is 5:00 AM to 5:30 PM PM.  Patient does not believe in God and denies issues with this.  He denies legal problems,  involvement.  Patient shares that he has a history of not having " "friends but he has made an effort to develop new friends.  He has noticed that most of the friends that he engages with are much younger than him.  He shares that his best friend is 23 years old.  Patient enjoys cooking, hiking, outdoor activities, and reading.  He currently has a podcast called \"The Other Dar Mireles\" which he enjoys.    Family History   Problem Relation Age of Onset   • Hypertension Mother    • Hypertension Father    • Diabetes Maternal Grandfather    • Heart disease Maternal Grandfather    • Mental illness Maternal Grandfather         PTSD   • Liver cancer Paternal Grandfather    • Asthma Paternal Grandfather          in .   • Diabetes Paternal Grandfather       Trauma History:  Denies     Social History     Socioeconomic History   • Marital status: Single   Tobacco Use   • Smoking status: Former Smoker     Packs/day: 2.00     Years: 15.00     Pack years: 30.00     Start date: 1993     Quit date: 3/6/2009     Years since quittin.4   • Smokeless tobacco: Never Used   Vaping Use   • Vaping Use: Never used   Substance and Sexual Activity   • Alcohol use: Yes     Alcohol/week: 1.0 standard drink     Types: 1 Cans of beer per week     Comment: Very seldom.   • Drug use: No   • Sexual activity: Not Currently     Partners: Female     Birth control/protection: Condom     Assessment & Plan   Trauma Assessment:  Patient denies having been hit, slapped, kicked and/or otherwise physically hurt by others in the past year.  Patient alsodenies having been forced to engage in any unwanted sexual acts within the last year.       Risk Assessment:  [x] No SI/HI, [x]  passive thoughts by history [x]  suicidal ideation with intent, plan, and/or means by history []  homicidal ideation  [x] self-harm by history (Explain:  Pt stopped taking all of his medical/psychiatric medications with the idea that he would just die).    Clinical Markers: Thomas feels, agitated, depressed, helpless, hopeless, mild " to moderate anxiety, personal hx of suicide and/or self-harm and trapped    Protective Factors: Responsibility to family, friends, pets, and/or self, Supportive family , Supportive friends/social network, Optimistic and hopeful he/she will get better, Engaged in work, school or home life, Engaged with therapist and treatment team, Willing to commit to a Safety Plan, Availability of physical and mental health care/Access to treatment, Limited access to means (e.g., knives, guns, sharps), Involvement in hobbies and/or activities  and Motivated to change     Summary of Suicide Risk Assessment: Unalterable demographics and a history of mental health intervention indicate this patient is in a AT RISK category compared to the general population. At present, the patient denies active SI/HI, intentions, or plans at this time and agrees to seek immediate care should such thoughts develop. The patient verbalizes understanding of how to access emergency care if needed and agrees to do so. Consideration of suicide risk and protective factors such as history, current presentation, individual strengths and weaknesses, psychosocial and environmental stressors and variables, psychiatric illness and symptoms, medical conditions and pain, took place in this interview. Based on those considerations, the patient is determined: within individual baseline and presenting no imminent risk for suicide or homicide. Other recommendations: The patient does not meet the criteria for inpatient admission and is not a safety risk to self or others at today's visit. Inpatient treatment offers no significant advantages over outpatient treatment for this patient at today's visit.    Safety Plan:  Patient was given ample time for questions and fully participated in treatment planning.  Patient was encouraged to call the clinic with any questions or concerns.  Patient was informed of access to emergency care. If patient were to develop any significant  symptomatology, suicidal ideation, homicidal ideation, any concerns, or feel unsafe at any time they are to call the clinic and if unable to get immediate assistance should immediately call 911 or go to the nearest emergency room.  The patient is advised to remove or secure (lock away) all lethal weapons (including guns) and sharps (including razors, scissors, knives, etc.).  All medications (including any prescribed and any over the counter medications) should be stored in a safe and secured location that is not obtainable by children/adolescents.  Patient was given an opportunity and encouraged to ask questions about their medication, illness, and treatment. Patient contracted verbally for the following: If you are experiencing an emotional crisis or have thoughts of harming yourself or others, please go to your nearest local emergency room or call 911. Patient was given the number to the office. Number also available to the 24- hour suicide hotline.   National Suicide Prevention Lifeline:  Call 1-822.987.3656    Mental Status Exam:    Hygiene:   good  Appearance: Neat  Cooperation:  Cooperative  Eye Contact:  Good  Psychomotor Behavior:  Appropriate  Mood: Euthymic  Affect:  Full range  Speech:  Normal  Thought Progress:  Goal directed and Linear  Thought Content:  Normal and Mood congruent  Suicidal:  None  Homicidal:  None  Hallucinations:  None  Delusion:  None  Memory:  Intact  Orientation:  Person, Place, Time and Situation  Reliability:  Fair  Insight:  Good  Judgement:  Fair  Impulse Control:  Fair    Psychological ROS: positive for - anxiety, depression, mood swings, sleep disturbances and somatic symptoms    Functional Status: Moderate impairment     URICA Score: pending     Diagnosis:    1. Bipolar II disorder (HCC)    2. LUIS (generalized anxiety disorder)    3. Anxiety attack    4. Suicidal risk      Summary of Visit: This is an initial encounter with a psychiatric provider. Patient provided  information for intake update.  Patient shares he is seeking treatment because he wants to learn how to manage his anger and mental health issues.  Patient participated in initial/interim treatment plan.The diagnoses today include: The primary encounter diagnosis was Bipolar II disorder (HCC). Diagnoses of LUIS (generalized anxiety disorder), Anxiety attack, and Suicidal risk were also pertinent to this visit.. The prognosis regarding these disorders is undetermined. Unique factors influencing recovery include: existing premorbid conditions, symptom chronicity, symptom severity, degree of impairment, patient engagement, motivation and medication adherence.  It is established this individual suffers from a chronic/pervasive mental illness which has caused significant  impairment in at least two important important areas of functioning.  Patient appears to be stable at this time.  Patient can reasonably be expected to continue to benefit from treatment and would likely be at increased risk for decompensation if treatment were stopped.  Patient endorses a positive benefit from therapy.   Patient will be admitted to the [facility]patient [facility].  Patient expresses gratitude and states he had a positive experience today.    Prognosis:  The prognosis regarding these disorders is undetermined. Unique factors influencing recovery include: existing premorbid conditions, symptom chronicity, symptom severity, degree of impairment, patient engagement, motivation and medication adherence.  It is established this individual suffers from a chronic/pervasive mental illness which has caused significant  impairment in at least two important important areas of functioning.  Patient appears to be stable at this time.  Patient can reasonably be expected to continue to benefit from treatment and would likely be at increased risk for decompensation if treatment were stopped.  Patient endorses a positive benefit from  therapy.   Patient will be admitted to the [facility] Therapy Outpatient Program.  Patient expresses gratitude and states she had a positive experience today.    Plan:  Treatment plan status:  Active and Initial 08/31/22    • Note:  The patient is agreeable to the identified treatment plan and is receptive to receiving assistance on how to cope with and/or resolve reported issues.    Short-term goals: Patient will be compliant with clinic appointments.  Patient will be engaged in therapy, medication compliant with minimal side effects. Patient  will report decrease of symptoms and frequency.    Long-term goals: Patient will have minimal symptoms of  with continued medication management. Patient will be compliant with treatment and appointments.     Homework:  Complete assessment pkt and return next session      Follow Up:  Return in about 2 weeks, or earlier if symptoms worsen or fail to improve.  The patient understands that treatment is conditional on adhering to all [facility] Outpatient Policy and Procedures.  The patient understands that providers/clinic has discretion to dismissed them from care if these are breached and a recommendation for further care will be made at time of discharge.    Future Appointments       Provider Department Center    9/20/2022 8:00 AM Gia Pedro LPCC [facility]    11/16/2022 12:30 PM Melony Mcgee APRN [facility]        Recommended Referrals: Psychiatrist/APRN and Medical Provider (PCP)      This document electronically signed by BRANDT Jeffers-S, ELZA, CAMS-II August 31, 2022 14:40 EDT    Please note that portions of this documentation may have been completed with a voice recognition program.  Efforts were made to edit this dictation, but occasional words may have been mistranscribed.

## 2022-09-06 PROBLEM — Z12.11 ENCOUNTER FOR COLORECTAL CANCER SCREENING: Status: ACTIVE | Noted: 2022-09-06

## 2022-09-06 PROBLEM — Z12.12 ENCOUNTER FOR COLORECTAL CANCER SCREENING: Status: ACTIVE | Noted: 2022-09-06

## 2022-09-14 ENCOUNTER — ANESTHESIA (OUTPATIENT)
Dept: PERIOP | Facility: HOSPITAL | Age: 47
End: 2022-09-14

## 2022-09-14 ENCOUNTER — ANESTHESIA EVENT (OUTPATIENT)
Dept: PERIOP | Facility: HOSPITAL | Age: 47
End: 2022-09-14

## 2022-09-14 ENCOUNTER — HOSPITAL ENCOUNTER (OUTPATIENT)
Facility: HOSPITAL | Age: 47
Setting detail: HOSPITAL OUTPATIENT SURGERY
Discharge: HOME | End: 2022-09-14
Attending: INTERNAL MEDICINE | Admitting: INTERNAL MEDICINE

## 2022-09-14 VITALS
TEMPERATURE: 97.8 F | HEART RATE: 74 BPM | DIASTOLIC BLOOD PRESSURE: 83 MMHG | RESPIRATION RATE: 18 BRPM | OXYGEN SATURATION: 96 % | WEIGHT: 260 LBS | HEIGHT: 70 IN | BODY MASS INDEX: 37.22 KG/M2 | SYSTOLIC BLOOD PRESSURE: 128 MMHG

## 2022-09-14 DIAGNOSIS — K59.04 CHRONIC IDIOPATHIC CONSTIPATION: ICD-10-CM

## 2022-09-14 DIAGNOSIS — Z12.12 ENCOUNTER FOR COLORECTAL CANCER SCREENING: ICD-10-CM

## 2022-09-14 DIAGNOSIS — Z12.11 ENCOUNTER FOR COLORECTAL CANCER SCREENING: ICD-10-CM

## 2022-09-14 PROCEDURE — 45385 COLONOSCOPY W/LESION REMOVAL: CPT | Performed by: INTERNAL MEDICINE

## 2022-09-14 PROCEDURE — 25010000002 PROPOFOL 10 MG/ML EMULSION: Performed by: NURSE ANESTHETIST, CERTIFIED REGISTERED

## 2022-09-14 RX ORDER — ONDANSETRON 2 MG/ML
4 INJECTION INTRAMUSCULAR; INTRAVENOUS AS NEEDED
Status: DISCONTINUED | OUTPATIENT
Start: 2022-09-14 | End: 2022-09-14 | Stop reason: HOSPADM

## 2022-09-14 RX ORDER — SODIUM CHLORIDE 0.9 % (FLUSH) 0.9 %
10 SYRINGE (ML) INJECTION AS NEEDED
Status: DISCONTINUED | OUTPATIENT
Start: 2022-09-14 | End: 2022-09-14 | Stop reason: HOSPADM

## 2022-09-14 RX ORDER — SODIUM CHLORIDE, SODIUM LACTATE, POTASSIUM CHLORIDE, CALCIUM CHLORIDE 600; 310; 30; 20 MG/100ML; MG/100ML; MG/100ML; MG/100ML
100 INJECTION, SOLUTION INTRAVENOUS ONCE AS NEEDED
Status: DISCONTINUED | OUTPATIENT
Start: 2022-09-14 | End: 2022-09-14 | Stop reason: HOSPADM

## 2022-09-14 RX ORDER — PROPOFOL 10 MG/ML
VIAL (ML) INTRAVENOUS AS NEEDED
Status: DISCONTINUED | OUTPATIENT
Start: 2022-09-14 | End: 2022-09-14 | Stop reason: SURG

## 2022-09-14 RX ORDER — MIDAZOLAM HYDROCHLORIDE 1 MG/ML
1 INJECTION INTRAMUSCULAR; INTRAVENOUS
Status: DISCONTINUED | OUTPATIENT
Start: 2022-09-14 | End: 2022-09-14 | Stop reason: HOSPADM

## 2022-09-14 RX ORDER — MEPERIDINE HYDROCHLORIDE 25 MG/ML
12.5 INJECTION INTRAMUSCULAR; INTRAVENOUS; SUBCUTANEOUS
Status: DISCONTINUED | OUTPATIENT
Start: 2022-09-14 | End: 2022-09-14 | Stop reason: HOSPADM

## 2022-09-14 RX ORDER — IPRATROPIUM BROMIDE AND ALBUTEROL SULFATE 2.5; .5 MG/3ML; MG/3ML
3 SOLUTION RESPIRATORY (INHALATION) ONCE AS NEEDED
Status: DISCONTINUED | OUTPATIENT
Start: 2022-09-14 | End: 2022-09-14 | Stop reason: HOSPADM

## 2022-09-14 RX ORDER — OXYCODONE HYDROCHLORIDE AND ACETAMINOPHEN 5; 325 MG/1; MG/1
1 TABLET ORAL ONCE AS NEEDED
Status: DISCONTINUED | OUTPATIENT
Start: 2022-09-14 | End: 2022-09-14 | Stop reason: HOSPADM

## 2022-09-14 RX ORDER — SODIUM CHLORIDE, SODIUM LACTATE, POTASSIUM CHLORIDE, CALCIUM CHLORIDE 600; 310; 30; 20 MG/100ML; MG/100ML; MG/100ML; MG/100ML
125 INJECTION, SOLUTION INTRAVENOUS ONCE
Status: DISCONTINUED | OUTPATIENT
Start: 2022-09-14 | End: 2022-09-14 | Stop reason: HOSPADM

## 2022-09-14 RX ORDER — SODIUM CHLORIDE 0.9 % (FLUSH) 0.9 %
10 SYRINGE (ML) INJECTION EVERY 12 HOURS SCHEDULED
Status: DISCONTINUED | OUTPATIENT
Start: 2022-09-14 | End: 2022-09-14 | Stop reason: HOSPADM

## 2022-09-14 RX ORDER — FENTANYL CITRATE 50 UG/ML
50 INJECTION, SOLUTION INTRAMUSCULAR; INTRAVENOUS
Status: DISCONTINUED | OUTPATIENT
Start: 2022-09-14 | End: 2022-09-14 | Stop reason: HOSPADM

## 2022-09-14 RX ADMIN — PROPOFOL 180 MCG/KG/MIN: 10 INJECTION, EMULSION INTRAVENOUS at 08:24

## 2022-09-14 RX ADMIN — PROPOFOL 50 MG: 10 INJECTION, EMULSION INTRAVENOUS at 08:24

## 2022-09-14 NOTE — ANESTHESIA PREPROCEDURE EVALUATION
Anesthesia Evaluation     Patient summary reviewed and Nursing notes reviewed   no history of anesthetic complications:  NPO Solid Status: > 8 hours  NPO Liquid Status: > 8 hours           Airway   Mallampati: III  TM distance: >3 FB  Neck ROM: full  Large neck circumference and Possible difficult intubation  Dental - normal exam     Pulmonary - normal exam    breath sounds clear to auscultation  (+) a smoker Former, shortness of breath,   Cardiovascular - normal exam    ECG reviewed  Patient on routine beta blocker and Beta blocker given within 24 hours of surgery  Rhythm: regular  Rate: normal    (+) hypertension, past MI  >12 months, BECKWITH, hyperlipidemia,       Neuro/Psych  (+) headaches, psychiatric history Bipolar, Anxiety and Depression,    GI/Hepatic/Renal/Endo    (+) obesity, morbid obesity,  renal disease stones, diabetes mellitus type 2,     Musculoskeletal (-) negative ROS    Abdominal   (+) obese,    Substance History - negative use     OB/GYN negative ob/gyn ROS         Other - negative ROS                       Anesthesia Plan    ASA 3     general   total IV anesthesia  intravenous induction     Anesthetic plan, risks, benefits, and alternatives have been provided, discussed and informed consent has been obtained with: patient.  Pre-procedure education provided  Plan discussed with CRNA.        CODE STATUS:

## 2022-09-14 NOTE — ANESTHESIA POSTPROCEDURE EVALUATION
Patient: Thomas Mireles    Procedure Summary     Date: 09/14/22 Room / Location: [facility] OR  /  [facility] OR    Anesthesia Start: 0821 Anesthesia Stop: 0900    Procedure: COLONOSCOPY (N/A ) Diagnosis:       Chronic idiopathic constipation      Encounter for colorectal cancer screening      (Chronic idiopathic constipation [K59.04])      (Encounter for colorectal cancer screening [Z12.11, Z12.12])    Surgeons: Judy Grant MD Provider: Anurag Ho DO    Anesthesia Type: general ASA Status: 3          Anesthesia Type: general    Vitals  Vitals Value Taken Time   /83 09/14/22 0931   Temp 97.8 °F (36.6 °C) 09/14/22 0901   Pulse 74 09/14/22 0931   Resp 18 09/14/22 0931   SpO2 96 % 09/14/22 0931           Post Anesthesia Care and Evaluation    Patient location during evaluation: PHASE II  Patient participation: complete - patient participated  Level of consciousness: awake and alert  Pain score: 0  Pain management: adequate    Airway patency: patent  Anesthetic complications: No anesthetic complications  PONV Status: controlled  Cardiovascular status: acceptable  Respiratory status: acceptable  Hydration status: euvolemic  No anesthesia care post op

## 2022-09-15 LAB — REF LAB TEST METHOD: NORMAL

## 2022-09-19 NOTE — PROGRESS NOTES
At the time of your recent colonoscopy, 5 polyps were removed from the colon.  All of the polyps were tubular adenomas.  These are considered precancerous.  Due to the size of one of the  polyps and the number of polyps found during this procedure, we will repeat your colonoscopy in 1 year.

## 2022-09-20 ENCOUNTER — OFFICE VISIT (OUTPATIENT)
Dept: PSYCHIATRY | Facility: CLINIC | Age: 47
End: 2022-09-20

## 2022-09-20 DIAGNOSIS — R45.89 SUICIDAL RISK: ICD-10-CM

## 2022-09-20 DIAGNOSIS — F51.05 INSOMNIA DUE TO MENTAL DISORDER: ICD-10-CM

## 2022-09-20 DIAGNOSIS — F31.30: Primary | ICD-10-CM

## 2022-09-20 DIAGNOSIS — F41.1 GAD (GENERALIZED ANXIETY DISORDER): ICD-10-CM

## 2022-09-20 PROCEDURE — 90837 PSYTX W PT 60 MINUTES: CPT | Performed by: COUNSELOR

## 2022-09-20 NOTE — PROGRESS NOTES
"[facility]  Outpatient  Progress Note   Patient Status:  Established  Name:  Thomas Mireles  Date of Service: September 20, 2022  Time In: 08:26 EDT  Time Out: 9:30 am    Identifying Information: Thomas Mireles is a 46 y.o. male presenting to [facility] for an individual therapy session by Gia Pedro, BRANDT -S, NCC, CAMS-II      Chief Complaint: Patient reports problems with agitation, depression, anxiety, mood swings, a Bipolar Mood Disorder and feeling down and hopeless, negative self-talk.     Session Goal:  Patient with explore and process thoughts/feelings/coping skills.     Subjective   HPI:  Patient arrived for session on time, clean and casually dressed without evidence of intoxication, withdrawal, or perceptual disturbance.   Patient arrived as: age appropriate and wearing casual attire. Patient indicates he is an open and willing participant in today's session.  Patient observed to have calm and pleasant affect and anxious/nervous mood. Patient rates the severity of depressive symptoms, on a scale of 1-10 (10 is the most severe), a 4 and anxiety at 2.  The symptoms are reported as: improved.  Patient adds the reported symptoms/behaviors have made it somewhat difficult to work, take care of things at home, or get along with other people.  Patient adamantly and convincingly denies having SI/HI with or without intent, plans, or means. Patient denies having Hallucinations/Illusions and Delusions.  Patient does not appear to be malingering.      Diagnostic Assessment Update:  (From Note dated 07/12/22 by LINDA Garcia) \"Patient reports he was diagnosed with Bipolar II disorder at age 27.  He reports the event that triggered the his psychiatric referral was his conversation with his PCP discussing his intentional self harm by not treating his diabetes for the last 7 years. Depression increased in 2016 after sustaining head injury when a wet ceiling tile " "fell on his head. He suffers from  Migraine headaches since the injury which  makes it difficult for him to work.  His PHQ score today is  17. He identifies the following criteria of MDD occurring over the last 2 weeks: anhedonia, feeling depressed, insomnia, fatigue, anorexia, poor self esteem, trouble concentration, SI.  Denies active plan or intent for suicide. Denies HI/AVH.  MDQ score 13 . Confirms the following criteria for mood disorder: hyperactivity, irritability, inflated self-esteem,decrease need for sleep, rapid speech, racing thoughts, distractibility,  more socially engaging, hypersexual, risky behavior, excessive spending. Most of these symptoms occur simultaneously and have caused serious problems in his family. Patient reports his longest consecutive period without sleep is three days. He describes going on a spending spree and borrowing $15,000 from his 401k. He was unable to repay the money. The loan defaulted and he had to pay taxes on it. Reports chronic insomnia. He is awake for 2 nights if he does not take Vistaril, but feels oversedated in the morning if he does.  Appetite is decreased. States he forces himself to eat. Reports losing 100 lbs in undefined span of time. Pt states that his manias can last between 1-2 weeks and up to \"months\".  His last reported vazquez was in 2015.  He borrowed $15,000 K off of his halfway \"and just blew it\".  He also reports having \"boundless energy\", distracted, grandiosity, and appeared to function on \"very little sleep\".  Update: Patient clarifies that his previous vazquez lasted up to a few months.  He tells me that he had dreams that have impacted him significantly such as drawing the money out of his 401(k) and having to pay taxes on the money.  He tells me that both the depression and mood swings/vazquez have substantially impacted his life.  The patient reports limitations in the ability to perform routine daily activities, sleep, work and social " constructs.  Patient reports that during the most severe time of the current major depressive episode, he experiences loss of pleasure in all, or almost all activities and lack of reactivity to usually pleasurable activities.  He finds it hard to feel better even when something good happens.    Note: When reviewing reported symptoms, it appears Thomas suffers from a Bipolar 1 Disorder, most recent episode depressed with melancholic features (F31.30)    Somatic Symptoms:  Patient reports he has experienced the following health problems within the past 4 weeks:  Pt endorses stomach pains, back pain, pain in arms, legs, joints, or hips. , feeling tired or having little energy, trouble falling or staying asleep or sleeping too much.  , pain or problems during sexual intercourse, headaches, constipation, loose bowels, or diarrhea.   and nausea, gas, or indigestions.  .  It is recommended this individual follow up with the identified PCP to address any medical concerns.    Substance Use: denied. Last reported use:     PHQ-9:Total Score: 16 (15-19 = Moderately severe depression)  LUIS 7: Total Score: 6 (5-9 = Mild anxiety)    Summary:  Patient indicates he has struggled with following problems over the last four weeks: Patient shares that the most stressful thing in his life right now is his health.  He shares that he is concerned with his weight and appearances, decreased pleasure/sexual desire, work stress, financial problems/worries, and thinking about past traumas.  Patient therapist explored and discussed the intake interview.  Per patient report, his diagnosis will be updated to reflect the reported changes.    Objective    Medical History: Areas Reviewed: The following portions of the patient's history were reviewed and updated as appropriate: allergies, current medications, past family history, past medical history, past social history, past surgical history and problem list.    Medication Review:    Current  Outpatient Medications:   •  Accu-Chek Guide test strip, USE AS DIRECTED EVERY DAY, Disp: 60 each, Rfl: 11  •  Accu-Chek Softclix Lancets lancets, USE TO CHECK BLOOD GLUCOSE EVERY DAY, Disp: 100 each, Rfl: 1  •  aspirin (aspirin) 81 MG EC tablet, Take 1 tablet by mouth Daily., Disp: 90 tablet, Rfl: 1  •  cyclobenzaprine (FLEXERIL) 5 MG tablet, Take 1 tablet by mouth At Night As Needed for Muscle Spasms., Disp: 90 tablet, Rfl: 1  •  Dulaglutide (Trulicity) 1.5 MG/0.5ML solution pen-injector, Inject 1.5 mg under the skin into the appropriate area as directed 1 (One) Time Per Week., Disp: 9 mL, Rfl: 1  •  fenofibrate (TRICOR) 145 MG tablet, Take 1 tablet by mouth Daily., Disp: 90 tablet, Rfl: 1  •  glucose blood test strip, For daily testing  E11.65, Disp: 100 each, Rfl: 11  •  glucose monitor monitoring kit, 1 each As Needed (use to check sugar once daily)., Disp: 1 each, Rfl: 0  •  hydrOXYzine (ATARAX) 10 MG tablet, Take 1 to 2 tablets by mouth 3 (Three) Times a Day As Needed for Anxiety (insomnia)., Disp: 60 tablet, Rfl: 2  •  lisinopril (PRINIVIL,ZESTRIL) 10 MG tablet, Take 1 tablet by mouth Daily., Disp: 90 tablet, Rfl: 1  •  lubiprostone (AMITIZA) 24 MCG capsule, Take 1 capsule by mouth 2 (Two) Times a Day With Meals., Disp: 60 capsule, Rfl: 11  •  metoprolol tartrate (LOPRESSOR) 25 MG tablet, Take 1 tablet by mouth Every 12 (Twelve) Hours., Disp: 180 tablet, Rfl: 1  •  metroNIDAZOLE (METROGEL) 0.75 % gel, Apply  topically to the appropriate area as directed 2 (Two) Times a Day., Disp: 45 g, Rfl: 5  •  naproxen (Naprosyn) 500 MG tablet, Take 1 tablet by mouth 2 (Two) Times a Day With Meals., Disp: 180 tablet, Rfl: 1  •  Omega-3 Fatty Acids (fish oil) 1000 MG capsule capsule, Take 1 capsule by mouth 2 (Two) Times a Day With Meals., Disp: 180 capsule, Rfl: 1  •  pantoprazole (PROTONIX) 40 MG EC tablet, , Disp: , Rfl:   •  traZODone (DESYREL) 100 MG tablet, Take 1 & 1/2 tablets by mouth Every Night., Disp: 45  tablet, Rfl: 2  •  triamcinolone (KENALOG) 0.1 % cream, Apply 1 application topically to the appropriate area as directed 2 (Two) Times a Day., Disp: 80 g, Rfl: 2  •  ziprasidone (Geodon) 80 MG capsule, Take 1 capsule by mouth 2 (Two) Times a Day With Meals., Disp: 60 capsule, Rfl: 2     Medication Compliance:  compliance with medication regimen; Side Effects reported:  no.  Explain:      Assessment & Plan    Trauma Assessment:  Patient denies having been hit, slapped, kicked and/or otherwise physically hurt by others since last visit.  Patient alsodenies having been forced to engage in any unwanted sexual acts since last visit.      Risk Assessment:  [x] No SI/HI, [x]  passive thoughts by history [x]  suicidal ideation with intent, plan, and/or means by history []  homicidal ideation  [x] self-harm by history (Pt stopped taking all of his medical/psychiatric medications with the idea that he would just die.)    Risk Level: History obtained from: patient and chart review.  Due to historical context and reported clinical markers, it appears patient meets criteria for AT RISK to engage in self-harm or harm to others.  It is recommended Thomas be evaluated and assessed each contact for intent, plan, means and/or lethality each contact.    Clinical Response: Therapist assisted patient in identifying risk factors which would indicate the need for higher level of care including thoughts to harm self or others and/or self-harming behavior and encouraged patient to contact this office, call 911, or present to the nearest emergency room should any of these events occur and discussed crisis intervention services and means to access.    Review of Symptoms:  Positive for -  Anxiety, Depression, Irritability, Mood Swings, Poor Self-Esteem/Self-Confidence, Sleep Disturbance, Fatigue/Lethargy, Appetite Changes, Psychomotor Agitation, Trouble Relaxing, Anhedonia, Negative cognitions, Decreased Concentration, Poor Social  Engagement(s), Dysphoric Affect    Mental Status Exam:    Hygiene:   good  Appearance: Neat  Cooperation:  Cooperative  Eye Contact:  Good  Psychomotor Behavior:  Psychomotor agitation  Mood: Sad/Depressed, Dysphoric and Anxious/Nervous  Affect:  Impaired, Blunted, Dysphoric and Emotional  Speech:  Normal  Thought Progress:  Goal directed and Linear  Thought Content:  Normal and Mood congruent  Suicidal:  None  Homicidal:  None  Hallucinations:  None  Delusion:  None  Memory:  Intact  Orientation:  Person, Place, Time and Situation  Reliability:  Fair  Insight:  Good  Judgement:  Fair  Impulse Control:  Fair    Diagnosis:      ICD-10-CM ICD-9-CM   1. Bipolar I disorder, most recent episode depressed with melancholic features (HCC)  F31.30 296.50   2. LUIS (generalized anxiety disorder)  F41.1 300.02   3. Insomnia due to mental disorder  F51.05 300.9     327.02   4. Suicidal risk  R45.89 300.9     Treatment plan status: 08/31/22 Active, Treatment Plan Continued, Discussed the diagnosis with the patient and all questions answered. and Educational material distributed.   Treatment plan progress: New  Prognosis: Guarded with Ongoing Treatment    Functional Status: Moderate impairment     URICA Score: 12 Preparation - Preparation is a stage that combines intention and behavioral criteria. Individuals in this stage are intending to take action in the next month and have unsuccessfully taken action in the past year.    Session Summary: Therapist continues to assess the patient's level of insight and progress.  Therapist assisted patient in processing above session content; acknowledged and normalized patient’s thoughts, feelings, and concerns. Therapist discussed patient triggers associated with The primary encounter diagnosis was Bipolar I disorder, most recent episode depressed with melancholic features (HCC). Diagnoses of LUIS (generalized anxiety disorder), Insomnia due to mental disorder, and Suicidal risk were also  pertinent to this visit..   Therapist allowed patient to freely discuss issues without interruption or judgment, provided safe, confidential environment to facilitate the development of positive therapeutic relationship and encourage open, honest communication.     Justification for therapy: Patient continues to struggle with a chronic/pervasive mental illness which continues to cause impairment in at least two important important areas of functioning.  Patient appear(s) to maintain relative stability as compared to the  baseline measure.  Patient can reasonably be expected to continue to benefit from treatment and would likely be at increased risk for decompensation if treatment were stopped.  Patient endorses a positive benefit from theapy and appears to meet outpatient level of care. Patient expresses gratitude and reports he had a positive experience today.    Plan:  1) Patient will continue in individual outpatient therapy with focus on improved functioning and coping skills, maintaining stability, and avoiding decompensation and the need for higher level of care.  2) Patient will adhere to medication regimen as prescribed and report any side effects. Patient will contact this office, call 911 or present to the nearest emergency room should suicidal or homicidal ideations occur. Provide Cognitive Behavioral Therapy and Solution Focused Therapy to improve functioning, maintain stability, and avoid decompensation and the need for higher level of care.   3)  Homework:  Complete and return the provided assessments.  Complete and return the exploration questions on The 7 Traits of Change-Readiness, Brief-Register, and URICA      Follow Up:  Return in about 2-3 weeks, or earlier if symptoms worsen or fail to improve.  The patient understands that treatment is conditional on adhering to all [facility] Outpatient Policy and Procedures.  The patient understands that providers/clinic has discretion to dismissed them from  care if these are breached and a recommendation for further care will be made at time of discharge.    Future Appointments       Provider Department Center    10/12/2022 12:30 PM Gia Pedro LPCC [facility]    10/27/2022 1:30 PM Gregg Navarro PA-C [facility]    11/16/2022 12:30 PM Melony Mcgee APRN [facility]        Recommended Referrals: Psychiatrist/APRN and Medical Provider (PCP)      This document signed by BRANDT Jeffers-S, ELZA, CAMS-II September 20, 2022 08:26 EDT  Please note that portions of this documentation may have been completed with a voice recognition program.  Efforts were made to edit this dictation, but occasional words may have been mistranscribed.

## 2022-09-27 ENCOUNTER — TELEPHONE (OUTPATIENT)
Dept: FAMILY MEDICINE CLINIC | Facility: CLINIC | Age: 47
End: 2022-09-27

## 2022-09-27 RX ORDER — NIRMATRELVIR AND RITONAVIR 300-100 MG
3 KIT ORAL 2 TIMES DAILY
Qty: 30 EACH | Refills: 0 | Status: SHIPPED | OUTPATIENT
Start: 2022-09-27 | End: 2022-10-03

## 2022-09-27 NOTE — TELEPHONE ENCOUNTER
Caller: Thomas Mireles    Relationship: Self    Best call back number: [phone]    What is the best time to reach you: ANY    Who are you requesting to speak with (clinical staff, provider,  specific staff member): EITHER      What was the call regarding: PATIENT TESTED POSITIVE FOR COVID WITH A HOME TEST. SYMPTOMS ARE CURRENTLY: NO APPETITE, CHILLS,  HEADACHE FOF THE LAST TWO DAYS.     Do you require a callback: PLEASE CALL BACK

## 2022-10-20 ENCOUNTER — OFFICE VISIT (OUTPATIENT)
Dept: PSYCHIATRY | Facility: CLINIC | Age: 47
End: 2022-10-20

## 2022-10-20 DIAGNOSIS — F41.0 ANXIETY ATTACK: ICD-10-CM

## 2022-10-20 DIAGNOSIS — F41.1 GAD (GENERALIZED ANXIETY DISORDER): ICD-10-CM

## 2022-10-20 DIAGNOSIS — R45.89 SUICIDAL RISK: ICD-10-CM

## 2022-10-20 DIAGNOSIS — F31.30: Primary | ICD-10-CM

## 2022-10-20 DIAGNOSIS — G47.09 OTHER INSOMNIA: ICD-10-CM

## 2022-10-20 DIAGNOSIS — F51.05 INSOMNIA DUE TO MENTAL DISORDER: ICD-10-CM

## 2022-10-20 PROCEDURE — 90834 PSYTX W PT 45 MINUTES: CPT | Performed by: COUNSELOR

## 2022-10-24 RX ORDER — PANTOPRAZOLE SODIUM 40 MG/1
40 TABLET, DELAYED RELEASE ORAL DAILY
Qty: 90 TABLET | Refills: 1 | Status: SHIPPED | OUTPATIENT
Start: 2022-10-24 | End: 2023-02-21 | Stop reason: SDUPTHER

## 2022-10-26 NOTE — PROGRESS NOTES
"Subjective   Thomas Mireles is a 46 y.o. male who presents today for follow up    Chief Complaint:  Bipolar disorder, insomnia    History of Present Illness:   History of Present Illness  Today is a follow up visit. Patient is taking medication as directed, denies side effects. Things are \"going\". 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 He continues to work 12 hour shifts, he sometimes sleeps better on his off days. Currently lives with parents.   Depression rated 7-8/10, anxiety rated -53/10, with 10 being the worst.   Sleep is ok, getting about 3 to 5 hours a night, denies NM.   Appetite is ok, eats once a day, he thinks he has gained 3 lbs.   Denies SI/HI/AVH.  Denies thoughts of self-harm.   Chronic health issues, no acute physical or medical issues today  Prior medications include Zoloft (bad reaction), seroquel (caused him feel like a zombie), haldol (caused vazquez), topamax (worked for a while), lithium (tremors, weight gain, affected blood pressure).        The following portions of the patient's history were reviewed and updated as appropriate: allergies, current medications, past family history, past medical history, past social history, past surgical history and problem list.      Past Medical History:  Past Medical History:   Diagnosis Date   • Anxiety    • Bipolar 1 disorder (HCC)    • Constipation    • Depression    • Head injury    • HL (hearing loss)    • Hyperlipidemia    • Hypertension    • Kidney stone 2018   • Migraine    • Obesity    • Psychiatric illness    • Suicide attempt (HCC)    • Type 2 diabetes mellitus (HCC)        Social History:  Social History     Socioeconomic History   • Marital status: Single   Tobacco Use   • Smoking status: Former     Packs/day: 2.00     Years: 15.00     Pack years: 30.00     Types: Cigarettes     Start date: 1993     Quit date: 3/6/2009     Years since quittin.6   • Smokeless tobacco: Never   Vaping Use   • " Vaping Use: Never used   Substance and Sexual Activity   • Alcohol use: Yes     Alcohol/week: 3.0 standard drinks     Types: 3 Cans of beer per week     Comment: Very seldom.   • Drug use: No   • Sexual activity: Not Currently     Partners: Female     Birth control/protection: Condom, Abstinence       Family History:  Family History   Problem Relation Age of Onset   • Hypertension Mother    • Hypertension Father    • Diabetes Maternal Grandfather    • Heart disease Maternal Grandfather    • Mental illness Maternal Grandfather         PTSD   • Liver cancer Paternal Grandfather    • Asthma Paternal Grandfather          in .   • Diabetes Paternal Grandfather        Past Surgical History:  Past Surgical History:   Procedure Laterality Date   • COLONOSCOPY N/A 2021    Procedure: COLONOSCOPY;  Surgeon: Judy Grant MD;  Location: [facility];  Service: Gastroenterology;  Laterality: N/A;   • COLONOSCOPY N/A 2022    Procedure: COLONOSCOPY;  Surgeon: Judy Grant MD;  Location: [facility];  Service: Gastroenterology;  Laterality: N/A;  TATTOOED PORTION OF SIGMOID COLON   • KNEE SURGERY      right   • NASAL POLYP EXCISION     • URETHRAL DILATATION      infant       Problem List:  Patient Active Problem List   Diagnosis   • Hypertension   • Hyperlipidemia   • Type 2 diabetes mellitus with hyperglycemia (HCC)   • Bipolar II disorder (HCC)   • Rosacea   • Abnormal EKG   • Old myocardial infarction by EKG criteria.   • Dyspnea on exertion (class 2-3)   • Head injury   • Chronic idiopathic constipation   • Encounter for colorectal cancer screening       Allergy:   Allergies   Allergen Reactions   • Statins Other (See Comments)     All over pain,    • Zofran [Ondansetron] Nausea And Vomiting   • Zoloft [Sertraline Hcl] Nausea And Vomiting        Current Medications:   Current Outpatient Medications   Medication Sig Dispense Refill   • Accu-Chek Guide test strip USE AS DIRECTED  EVERY DAY 60 each 11   • Accu-Chek Softclix Lancets lancets USE TO CHECK BLOOD GLUCOSE EVERY  each 1   • aspirin (aspirin) 81 MG EC tablet Take 1 tablet by mouth Daily. 90 tablet 1   • cyclobenzaprine (FLEXERIL) 5 MG tablet Take 1 tablet by mouth At Night As Needed for Muscle Spasms. 90 tablet 1   • Dulaglutide (Trulicity) 1.5 MG/0.5ML solution pen-injector Inject 1.5 mg under the skin into the appropriate area as directed 1 (One) Time Per Week. 9 mL 1   • fenofibrate (TRICOR) 145 MG tablet Take 1 tablet by mouth Daily. 90 tablet 1   • glucose blood test strip For daily testing  E11.65 100 each 11   • glucose monitor monitoring kit 1 each As Needed (use to check sugar once daily). 1 each 0   • hydrOXYzine (ATARAX) 10 MG tablet Take 1 to 2 tablets by mouth 3 (Three) Times a Day As Needed for Anxiety (insomnia). 60 tablet 2   • lisinopril (PRINIVIL,ZESTRIL) 10 MG tablet Take 1 tablet by mouth Daily. 90 tablet 1   • lubiprostone (AMITIZA) 24 MCG capsule Take 1 capsule by mouth 2 (Two) Times a Day With Meals. 60 capsule 11   • metoprolol tartrate (LOPRESSOR) 25 MG tablet Take 1 tablet by mouth Every 12 (Twelve) Hours. 180 tablet 1   • metroNIDAZOLE (METROGEL) 0.75 % gel Apply  topically to the appropriate area as directed 2 (Two) Times a Day. 45 g 5   • naproxen (Naprosyn) 500 MG tablet Take 1 tablet by mouth 2 (Two) Times a Day With Meals. 180 tablet 1   • Omega-3 Fatty Acids (fish oil) 1000 MG capsule capsule Take 1 capsule by mouth 2 (Two) Times a Day With Meals. 180 capsule 1   • pantoprazole (PROTONIX) 40 MG EC tablet Take 1 tablet by mouth Daily. 90 tablet 1   • traZODone (DESYREL) 100 MG tablet Take 1 & 1/2 tablets by mouth Every Night. 45 tablet 2   • triamcinolone (KENALOG) 0.1 % cream Apply 1 application topically to the appropriate area as directed 2 (Two) Times a Day. 80 g 2   • ziprasidone (Geodon) 80 MG capsule Take 1 capsule by mouth 2 (Two) Times a Day With Meals. 60 capsule 2   • FLUoxetine  "(PROzac) 10 MG capsule Take 1 capsule by mouth Daily. 30 capsule 2     No current facility-administered medications for this visit.       Review of Symptoms:    Review of Systems   Psychiatric/Behavioral: Positive for dysphoric mood, sleep disturbance and depressed mood. Negative for hallucinations, self-injury and suicidal ideas. The patient is nervous/anxious.    All other systems reviewed and are negative.      Objective   Physical Exam:   Blood pressure 150/80, height 177.8 cm (70\"), weight 117 kg (258 lb).  Body mass index is 37.02 kg/m².    Appearance:  male appears stated age, no acute distress noted.    Gait, Station, Strength: Steady, posture erect, WNL      Mental Status Exam: 10/31/22  Hygiene:   good  Cooperation:  Cooperative  Eye Contact:  Good  Psychomotor Behavior:  Appropriate  Affect:  Blunted  Mood: sad  Hopelessness: Denies  Speech:  Normal  Thought Process:  Linear  Thought Content:  Mood congruent  Suicidal:  None  Homicidal:  None  Hallucinations:  None  Delusion:  None  Memory:  Intact  Orientation:  Person, Place, Time and Situation  Reliability:  fair  Insight:  Fair  Judgement:  Fair  Impulse Control:  Fair       PHQ-Score Total:  PHQ-9 Total Score:      Lab Results:   No visits with results within 1 Month(s) from this visit.   Latest known visit with results is:   Admission on 2022, Discharged on 2022   Component Date Value Ref Range Status   • Reference Lab Report 2022    Final                    Value:[facility]  [address] [place] [address]  Phone: [phone] or [phone]  Fax: [phone]  Nael Cunningham M.D., Medical Director    PATIENT NAME                           LABORATORY NO.  COLYB WELLS                      [id number]  [id number]                         AGE              SEX  SSN           CLIENT REF #  [facility]              46      1975      [id number]   [id number]    1 " "[facility]                     REQUESTING M.D.     ATTENDING [place] [address]                   BEBO PORTER  DATE COLLECTED      DATE RECEIVED      DATE REPORTED  09/14/2022          09/14/2022         09/15/2022    DIAGNOSIS:  A.   TRANSVERSE COLON POLYP:  Nondiagnostic specimen; tissue did not survive processing; rare fecal  material present  B.   DESCENDING COLON POLYP:  Tubular adenoma  No high grade dysplasia identified  C.   SIGMOID COLON POLYP:  Tubular adenoma, multiple fragments  No high grade                           dysplasia identified  D.   RECTAL POLYP:  Tubular adenoma and hyperplastic polyp  No high grade dysplasia identified    CLINICAL HISTORY:  Chronic idiopathic constipation, encounter for colorectal cancer screening    SPECIMENS RECEIVED:  A.  TRANSVERSE COLON POLYP  B.  DESCENDING COLON POLYP  C.  SIGMOID COLON POLYP  D.  RECTAL POLYP    MICROSCOPIC DESCRIPTION:  Tissue blocks are prepared and slides are examined microscopically on all  specimens. See diagnosis for details.    Professional interpretation rendered by Camila Aldridge D.O., [email or web address]. at  [facility], [address] [place] [address].    GROSS DESCRIPTION:  A.  Specimen is received in 1 formalin filled container \"large intestine,  transverse colon\". Specimen consists of a scant amount of possible tissue  measuring less than 0.1 cc in aggregate.  The specimen is filtered and dyed  with eosin in an attempt to recover all tissue, and is submitted entirely in  one cassette. MM  B.  Pieces: 2.  Aggregate dimensions: 0.3                           x 0.3 x 0.1 cm.  Cassettes:  One,  entirely submitted.  C.  Pieces: Multiple.  Aggregate dimensions: 1.8 x 0.9 x 0.3 cm.  Cassettes:  One, entirely submitted.  D.  Pieces: 3.  Aggregate dimensions: 0.3 x 0.2 x 0.1 cm.  Cassettes:  One,  entirely submitted.  MM    REVIEWED, DIAGNOSED AND ELECTRONICALLY  SIGNED BY:    Camila Aldridge D.O., F.C.A.P.  CPT CODES:  " [id number]         Assessment & Plan   Problems Addressed this Visit        Mental Health    Bipolar II disorder (HCC)    Relevant Medications    ziprasidone (Geodon) 80 MG capsule    traZODone (DESYREL) 100 MG tablet    hydrOXYzine (ATARAX) 10 MG tablet    FLUoxetine (PROzac) 10 MG capsule   Other Visit Diagnoses     Bipolar I disorder, most recent episode depressed with melancholic features (HCC)    -  Primary    Relevant Medications    ziprasidone (Geodon) 80 MG capsule    traZODone (DESYREL) 100 MG tablet    hydrOXYzine (ATARAX) 10 MG tablet    FLUoxetine (PROzac) 10 MG capsule    LUIS (generalized anxiety disorder)        Relevant Medications    ziprasidone (Geodon) 80 MG capsule    traZODone (DESYREL) 100 MG tablet    hydrOXYzine (ATARAX) 10 MG tablet    FLUoxetine (PROzac) 10 MG capsule    Other insomnia        Medication management        Insomnia due to mental disorder        Relevant Medications    ziprasidone (Geodon) 80 MG capsule    traZODone (DESYREL) 100 MG tablet    hydrOXYzine (ATARAX) 10 MG tablet    FLUoxetine (PROzac) 10 MG capsule      Diagnoses       Codes Comments    Bipolar I disorder, most recent episode depressed with melancholic features (HCC)    -  Primary ICD-10-CM: F31.30  ICD-9-CM: 296.50     LUIS (generalized anxiety disorder)     ICD-10-CM: F41.1  ICD-9-CM: 300.02     Other insomnia     ICD-10-CM: G47.09  ICD-9-CM: 780.52     Medication management     ICD-10-CM: Z79.899  ICD-9-CM: V58.69     Bipolar II disorder (HCC)     ICD-10-CM: F31.81  ICD-9-CM: 296.89     Insomnia due to mental disorder     ICD-10-CM: F51.05  ICD-9-CM: 300.9, 327.02         Social History     Tobacco Use   Smoking Status Former   • Packs/day: 2.00   • Years: 15.00   • Pack years: 30.00   • Types: Cigarettes   • Start date: 1993   • Quit date: 3/6/2009   • Years since quittin.6   Smokeless Tobacco Never     GLENN reviewed and appropriate. Patient counseled on use of controlled substances.     -The benefits  of a healthy diet and exercise were discussed with patient, especially the positive effects they have on mental health. Patient encouraged to consider lifestyle modification regarding  diet and exercise patterns to maximize results of mental health treatment.  -Reviewed previous available documentation  -Reviewed most recent available labs   -Lengthy discussion with patient on the possible side effects of antipsychotic medications including increased cholesterol, increased blood sugar, and possibility of weight gain.  Also discussed the need to monitor lab work associated with this.  The risk of muscle movement disorders with this class of medication was also discussed.    -Prior medications include Zoloft (bad reaction), seroquel (caused him feel like a zombie), haldol (caused vazquez), topamax (worked for a while), lithium (tremors, weight gain, affected blood pressure).     Visit Diagnoses:    ICD-10-CM ICD-9-CM   1. Bipolar I disorder, most recent episode depressed with melancholic features ([facility])  F31.30 296.50   2. LUIS (generalized anxiety disorder)  F41.1 300.02   3. Other insomnia  G47.09 780.52   4. Medication management  Z79.899 V58.69   5. Bipolar II disorder ([facility])  F31.81 296.89   6. Insomnia due to mental disorder  F51.05 300.9     327.02         TREATMENT PLAN/GOALS: Continue supportive psychotherapy efforts and medications as indicated. Treatment and medication options discussed during today's visit. Patient acknowledged and verbally consented to continue with current treatment plan and was educated on the importance of compliance with treatment and follow-up appointments.    MEDICATION ISSUES:  Discussed medication options and treatment plan of prescribed medication as well as the risks, benefits, and side effects including potential falls, possible impaired driving and metabolic adversities among others. Patient is agreeable to call the office with any worsening of symptoms or onset of side effects.  Patient is agreeable to call 911 or go to the nearest ER should he/she begin having SI/HI.     MEDS ORDERED DURING VISIT:  New Medications Ordered This Visit   Medications   • ziprasidone (Geodon) 80 MG capsule     Sig: Take 1 capsule by mouth 2 (Two) Times a Day With Meals.     Dispense:  60 capsule     Refill:  2   • traZODone (DESYREL) 100 MG tablet     Sig: Take 1 & 1/2 tablets by mouth Every Night.     Dispense:  45 tablet     Refill:  2   • hydrOXYzine (ATARAX) 10 MG tablet     Sig: Take 1 to 2 tablets by mouth 3 (Three) Times a Day As Needed for Anxiety (insomnia).     Dispense:  60 tablet     Refill:  2   • FLUoxetine (PROzac) 10 MG capsule     Sig: Take 1 capsule by mouth Daily.     Dispense:  30 capsule     Refill:  2       Return in about 3 months (around 1/31/2023).     -Continue Geodon to 80 mg twice daily for depression and mood stabilization  -Continue Hydroxyzine HCL 10 mg to 20 mg three times per day as needed for anxiety and insomnia  -Continue Trazodone 150 mg daily at night for insomnia and depression.  -Add Prozac 10 mg capsule daily, instructed to notify provider if manic symptoms occur.        Prognosis: Guarded dependent on medication/follow up and treatment plan compliance.  Functionality: pt showing improvements in important areas of daily functioning.     Short-term goals: Patient will adhere to medication regimen and note continued improvement in symptoms over the next 3 months.   Long-term goals: Patient will be adherent to medication management and psychotherapy with continued improvement in symptoms over the next 6 months    I spent 30 minutes caring for Thomas on this date of service. This time includes time spent by me in the following activities: preparing for the visit, obtaining and/or reviewing a separately obtained history, performing a medically appropriate examination and/or evaluation, counseling and educating the patient/family/caregiver, ordering medications, tests, or  procedures and documenting information in the medical record          This document has been electronically signed by LAUREN Sorenson   October 31, 2022 13:23 EDT    Part of this note may be an electronic transcription/translation of spoken language to printed text using the Dragon Dictation System.

## 2022-10-27 ENCOUNTER — OFFICE VISIT (OUTPATIENT)
Dept: GASTROENTEROLOGY | Facility: CLINIC | Age: 47
End: 2022-10-27

## 2022-10-27 VITALS
WEIGHT: 256 LBS | SYSTOLIC BLOOD PRESSURE: 137 MMHG | DIASTOLIC BLOOD PRESSURE: 82 MMHG | HEART RATE: 75 BPM | OXYGEN SATURATION: 99 % | BODY MASS INDEX: 36.65 KG/M2 | HEIGHT: 70 IN

## 2022-10-27 DIAGNOSIS — K59.04 CHRONIC IDIOPATHIC CONSTIPATION: Primary | ICD-10-CM

## 2022-10-27 DIAGNOSIS — Z86.010 HISTORY OF COLON POLYPS: ICD-10-CM

## 2022-10-27 PROCEDURE — 99213 OFFICE O/P EST LOW 20 MIN: CPT | Performed by: PHYSICIAN ASSISTANT

## 2022-10-27 NOTE — PROGRESS NOTES
"Chief Complaint   Patient presents with   • Constipation       Thomas Mireles is a 46 y.o. male who presents to the office today for evaluation of Constipation  .    HPI   Thomas Mireles is a 46 y.o. male who presents to the office today for a follow-up of constipation. He reports he was last seen in office in August in which his Linzess was discontinue as it was causing diarrhea. He reports Trulance was ineffective and he was sent in Motegrity once daily. He reports he was scheduled for a colonoscopy with Dr. Blackwood and underwent that on 9/14/2022 , it looks like he had normal perianal and digital rectal exam, had five colon polyps removed, one fairly large at 20 mm in the sigmoid colon and internal hemorrhoids. The polyps were precancerous and due to the size of the one in the sigmoid colon, he needs to repeat colonoscopy in 1 year. He states insurance did not cover Motegrity and he was placed on Amitiza 24 mcg. He denies having a lot of constipation stating it has not been bad since the colonoscopy. He states he is starting to have some issues with the Amitiza causing him to have \"blowouts\", therefore he has not been taking the medication.  He reports he is going a day or 2 without having any bowel movements and when he does have a bowel movement it is mostly diarrhea.       Review of Systems   Constitutional: Positive for unexpected weight change. Negative for fever.   HENT: Negative for trouble swallowing.    Eyes: Negative.    Respiratory: Negative for chest tightness.    Cardiovascular: Negative for chest pain.   Gastrointestinal: Positive for constipation and diarrhea. Negative for abdominal distention, abdominal pain, anal bleeding, blood in stool, nausea, rectal pain and vomiting.   Endocrine: Negative.    Genitourinary: Negative for difficulty urinating.   Musculoskeletal: Positive for back pain.   Skin: Negative.    Allergic/Immunologic: Negative for environmental allergies and food allergies. "   Neurological: Negative for headaches.   Hematological: Does not bruise/bleed easily.   Psychiatric/Behavioral: Negative.        ACTIVE PROBLEMS:   Specialty Problems        Gastroenterology Problems    Chronic idiopathic constipation           PAST MEDICAL HISTORY:  Past Medical History:   Diagnosis Date   • Anxiety    • Bipolar 1 disorder (HCC)    • Constipation    • Depression    • Head injury    • HL (hearing loss)    • Hyperlipidemia    • Hypertension    • Kidney stone 2018   • Migraine    • Obesity    • Psychiatric illness    • Suicide attempt (HCC)    • Type 2 diabetes mellitus (HCC)        SURGICAL HISTORY:  Past Surgical History:   Procedure Laterality Date   • COLONOSCOPY N/A 2021    Procedure: COLONOSCOPY;  Surgeon: Judy Grant MD;  Location: [facility];  Service: Gastroenterology;  Laterality: N/A;   • COLONOSCOPY N/A 2022    Procedure: COLONOSCOPY;  Surgeon: Judy Grant MD;  Location: [facility] OR;  Service: Gastroenterology;  Laterality: N/A;  TATTOOED PORTION OF SIGMOID COLON   • KNEE SURGERY      right   • NASAL POLYP EXCISION     • URETHRAL DILATATION      infant       FAMILY HISTORY:  Family History   Problem Relation Age of Onset   • Hypertension Mother    • Hypertension Father    • Diabetes Maternal Grandfather    • Heart disease Maternal Grandfather    • Mental illness Maternal Grandfather         PTSD   • Liver cancer Paternal Grandfather    • Asthma Paternal Grandfather          in .   • Diabetes Paternal Grandfather        SOCIAL HISTORY:  Social History     Tobacco Use   • Smoking status: Former     Packs/day: 2.00     Years: 15.00     Pack years: 30.00     Types: Cigarettes     Start date: 1993     Quit date: 3/6/2009     Years since quittin.6   • Smokeless tobacco: Never   Substance Use Topics   • Alcohol use: Yes     Alcohol/week: 3.0 standard drinks     Types: 3 Cans of beer per week     Comment: Very seldom.        CURRENT MEDICATION:    Current Outpatient Medications:   •  Accu-Chek Guide test strip, USE AS DIRECTED EVERY DAY, Disp: 60 each, Rfl: 11  •  Accu-Chek Softclix Lancets lancets, USE TO CHECK BLOOD GLUCOSE EVERY DAY, Disp: 100 each, Rfl: 1  •  aspirin (aspirin) 81 MG EC tablet, Take 1 tablet by mouth Daily., Disp: 90 tablet, Rfl: 1  •  cyclobenzaprine (FLEXERIL) 5 MG tablet, Take 1 tablet by mouth At Night As Needed for Muscle Spasms., Disp: 90 tablet, Rfl: 1  •  Dulaglutide (Trulicity) 1.5 MG/0.5ML solution pen-injector, Inject 1.5 mg under the skin into the appropriate area as directed 1 (One) Time Per Week., Disp: 9 mL, Rfl: 1  •  fenofibrate (TRICOR) 145 MG tablet, Take 1 tablet by mouth Daily., Disp: 90 tablet, Rfl: 1  •  glucose blood test strip, For daily testing  E11.65, Disp: 100 each, Rfl: 11  •  glucose monitor monitoring kit, 1 each As Needed (use to check sugar once daily)., Disp: 1 each, Rfl: 0  •  hydrOXYzine (ATARAX) 10 MG tablet, Take 1 to 2 tablets by mouth 3 (Three) Times a Day As Needed for Anxiety (insomnia)., Disp: 60 tablet, Rfl: 2  •  lisinopril (PRINIVIL,ZESTRIL) 10 MG tablet, Take 1 tablet by mouth Daily., Disp: 90 tablet, Rfl: 1  •  lubiprostone (AMITIZA) 24 MCG capsule, Take 1 capsule by mouth 2 (Two) Times a Day With Meals., Disp: 60 capsule, Rfl: 11  •  metoprolol tartrate (LOPRESSOR) 25 MG tablet, Take 1 tablet by mouth Every 12 (Twelve) Hours., Disp: 180 tablet, Rfl: 1  •  metroNIDAZOLE (METROGEL) 0.75 % gel, Apply  topically to the appropriate area as directed 2 (Two) Times a Day., Disp: 45 g, Rfl: 5  •  naproxen (Naprosyn) 500 MG tablet, Take 1 tablet by mouth 2 (Two) Times a Day With Meals., Disp: 180 tablet, Rfl: 1  •  Omega-3 Fatty Acids (fish oil) 1000 MG capsule capsule, Take 1 capsule by mouth 2 (Two) Times a Day With Meals., Disp: 180 capsule, Rfl: 1  •  pantoprazole (PROTONIX) 40 MG EC tablet, Take 1 tablet by mouth Daily., Disp: 90 tablet, Rfl: 1  •  traZODone  "(DESYREL) 100 MG tablet, Take 1 & 1/2 tablets by mouth Every Night., Disp: 45 tablet, Rfl: 2  •  triamcinolone (KENALOG) 0.1 % cream, Apply 1 application topically to the appropriate area as directed 2 (Two) Times a Day., Disp: 80 g, Rfl: 2  •  ziprasidone (Geodon) 80 MG capsule, Take 1 capsule by mouth 2 (Two) Times a Day With Meals., Disp: 60 capsule, Rfl: 2    ALLERGIES:  Statins, Zofran [ondansetron], and Zoloft [sertraline hcl]    VISIT VITALS:  /82   Pulse 75   Ht 177.8 cm (70\")   Wt 116 kg (256 lb)   SpO2 99%   BMI 36.73 kg/m²   Physical Exam  Constitutional:       General: He is not in acute distress.     Appearance: Normal appearance. He is well-developed.   HENT:      Head: Normocephalic and atraumatic.   Eyes:      Pupils: Pupils are equal, round, and reactive to light.   Cardiovascular:      Rate and Rhythm: Normal rate and regular rhythm.      Heart sounds: Normal heart sounds.   Pulmonary:      Effort: Pulmonary effort is normal. No respiratory distress.      Breath sounds: Normal breath sounds. No wheezing, rhonchi or rales.   Abdominal:      General: Abdomen is flat. Bowel sounds are normal. There is no distension.      Palpations: Abdomen is soft. There is no mass.      Tenderness: There is no abdominal tenderness. There is no guarding or rebound.      Hernia: No hernia is present.   Musculoskeletal:         General: No swelling. Normal range of motion.      Cervical back: Normal range of motion and neck supple.      Right lower leg: No edema.      Left lower leg: No edema.   Skin:     General: Skin is warm and dry.   Neurological:      Mental Status: He is alert and oriented to person, place, and time.   Psychiatric:         Attention and Perception: Attention normal.         Mood and Affect: Mood normal.         Speech: Speech normal.         Behavior: Behavior normal. Behavior is cooperative.         Thought Content: Thought content normal.         Assessment  "       1.Constipation.  Suggested that he try fiber since prior medications have caused diarrhea    His recent colonoscopy results were reviewed with him as well-he voiced understanding of these results.  Will undergo repeat screening colonoscopy in 1 year.   Diagnosis Plan   1. Chronic idiopathic constipation        2. History of colon polyps            Return if symptoms worsen or fail to improve.                   This document has been electronically signed by Gregg Navarro PA-C  October 28, 2022 08:59 EDT    Part of this note may be an electronic transcription/translation of spoken language to printed text using the Dragon Dictation System.    Transcribed from ambient dictation for Gregg Navarro PA-C by Carleen Leslie.  10/27/22   17:09 EDT    Patient or patient representative verbalized consent to the visit recording.

## 2022-10-31 ENCOUNTER — OFFICE VISIT (OUTPATIENT)
Dept: PSYCHIATRY | Facility: CLINIC | Age: 47
End: 2022-10-31

## 2022-10-31 VITALS
SYSTOLIC BLOOD PRESSURE: 150 MMHG | DIASTOLIC BLOOD PRESSURE: 80 MMHG | BODY MASS INDEX: 36.94 KG/M2 | HEIGHT: 70 IN | WEIGHT: 258 LBS

## 2022-10-31 DIAGNOSIS — F51.05 INSOMNIA DUE TO MENTAL DISORDER: ICD-10-CM

## 2022-10-31 DIAGNOSIS — F31.81 BIPOLAR II DISORDER: ICD-10-CM

## 2022-10-31 DIAGNOSIS — F31.30: Primary | ICD-10-CM

## 2022-10-31 DIAGNOSIS — Z79.899 MEDICATION MANAGEMENT: ICD-10-CM

## 2022-10-31 DIAGNOSIS — F41.1 GAD (GENERALIZED ANXIETY DISORDER): ICD-10-CM

## 2022-10-31 DIAGNOSIS — G47.09 OTHER INSOMNIA: ICD-10-CM

## 2022-10-31 PROCEDURE — 99214 OFFICE O/P EST MOD 30 MIN: CPT | Performed by: NURSE PRACTITIONER

## 2022-10-31 RX ORDER — ZIPRASIDONE HYDROCHLORIDE 80 MG/1
80 CAPSULE ORAL 2 TIMES DAILY WITH MEALS
Qty: 60 CAPSULE | Refills: 2 | Status: SHIPPED | OUTPATIENT
Start: 2022-10-31 | End: 2023-02-21 | Stop reason: SDUPTHER

## 2022-10-31 RX ORDER — FLUOXETINE 10 MG/1
10 CAPSULE ORAL DAILY
Qty: 30 CAPSULE | Refills: 2 | Status: SHIPPED | OUTPATIENT
Start: 2022-10-31 | End: 2023-03-20 | Stop reason: SDUPTHER

## 2022-10-31 RX ORDER — TRAZODONE HYDROCHLORIDE 100 MG/1
150 TABLET ORAL NIGHTLY
Qty: 45 TABLET | Refills: 2 | Status: SHIPPED | OUTPATIENT
Start: 2022-10-31 | End: 2023-02-21 | Stop reason: SDUPTHER

## 2022-10-31 RX ORDER — HYDROXYZINE HYDROCHLORIDE 10 MG/1
10-20 TABLET, FILM COATED ORAL 3 TIMES DAILY PRN
Qty: 60 TABLET | Refills: 2 | Status: SHIPPED | OUTPATIENT
Start: 2022-10-31 | End: 2023-02-21 | Stop reason: SDUPTHER

## 2022-11-21 ENCOUNTER — OFFICE VISIT (OUTPATIENT)
Dept: FAMILY MEDICINE CLINIC | Facility: CLINIC | Age: 47
End: 2022-11-21

## 2022-11-21 VITALS
HEIGHT: 70 IN | DIASTOLIC BLOOD PRESSURE: 92 MMHG | OXYGEN SATURATION: 98 % | SYSTOLIC BLOOD PRESSURE: 130 MMHG | WEIGHT: 267 LBS | BODY MASS INDEX: 38.22 KG/M2 | HEART RATE: 75 BPM | TEMPERATURE: 98.2 F

## 2022-11-21 DIAGNOSIS — E78.5 HYPERLIPIDEMIA, UNSPECIFIED HYPERLIPIDEMIA TYPE: ICD-10-CM

## 2022-11-21 DIAGNOSIS — F31.81 BIPOLAR II DISORDER: ICD-10-CM

## 2022-11-21 DIAGNOSIS — Z79.4 TYPE 2 DIABETES MELLITUS WITH HYPERGLYCEMIA, WITH LONG-TERM CURRENT USE OF INSULIN: ICD-10-CM

## 2022-11-21 DIAGNOSIS — M54.2 NECK PAIN: Primary | ICD-10-CM

## 2022-11-21 DIAGNOSIS — R51.9 CHRONIC DAILY HEADACHE: ICD-10-CM

## 2022-11-21 DIAGNOSIS — I10 ESSENTIAL HYPERTENSION: ICD-10-CM

## 2022-11-21 DIAGNOSIS — E11.65 TYPE 2 DIABETES MELLITUS WITH HYPERGLYCEMIA, WITH LONG-TERM CURRENT USE OF INSULIN: ICD-10-CM

## 2022-11-21 PROCEDURE — 96372 THER/PROPH/DIAG INJ SC/IM: CPT | Performed by: NURSE PRACTITIONER

## 2022-11-21 PROCEDURE — 99214 OFFICE O/P EST MOD 30 MIN: CPT | Performed by: NURSE PRACTITIONER

## 2022-11-21 RX ORDER — FENOFIBRATE 145 MG/1
145 TABLET, COATED ORAL DAILY
Qty: 90 TABLET | Refills: 1 | Status: SHIPPED | OUTPATIENT
Start: 2022-11-21

## 2022-11-21 RX ORDER — DULAGLUTIDE 1.5 MG/.5ML
1.5 INJECTION, SOLUTION SUBCUTANEOUS WEEKLY
Qty: 9 ML | Refills: 1 | Status: SHIPPED | OUTPATIENT
Start: 2022-11-21

## 2022-11-21 RX ORDER — DEXAMETHASONE SODIUM PHOSPHATE 4 MG/ML
8 INJECTION, SOLUTION INTRA-ARTICULAR; INTRALESIONAL; INTRAMUSCULAR; INTRAVENOUS; SOFT TISSUE ONCE
Status: COMPLETED | OUTPATIENT
Start: 2022-11-21 | End: 2022-11-21

## 2022-11-21 RX ORDER — METHYLPREDNISOLONE 4 MG/1
TABLET ORAL
Qty: 21 TABLET | Refills: 0 | Status: SHIPPED | OUTPATIENT
Start: 2022-11-21

## 2022-11-21 RX ADMIN — DEXAMETHASONE SODIUM PHOSPHATE 8 MG: 4 INJECTION, SOLUTION INTRA-ARTICULAR; INTRALESIONAL; INTRAMUSCULAR; INTRAVENOUS; SOFT TISSUE at 16:19

## 2022-11-21 NOTE — PROGRESS NOTES
Subjective   Thomas Mireles is a 47 y.o. male.   Chief Compliant: The patient presents with Back Pain (Pt reports upper back pain that shoots down into right arm (constant pain, does get worse with movement and sitting/laying for long periods of time))    Hyperlipidemia  This is a chronic problem. The current episode started more than 1 year ago. Recent lipid tests were reviewed and are variable. Exacerbating diseases include diabetes and obesity. Factors aggravating his hyperlipidemia include fatty foods. Pertinent negatives include no chest pain, leg pain or shortness of breath. Current antihyperlipidemic treatment includes diet change. The current treatment provides moderate improvement of lipids. Compliance problems include adherence to exercise and adherence to diet.  Risk factors for coronary artery disease include family history, dyslipidemia, hypertension, male sex and obesity.   Diabetes  He presents for his follow-up diabetic visit. He has type 2 diabetes mellitus. No MedicAlert identification noted. The initial diagnosis of diabetes was made 9 years ago. His disease course has been improving. Hypoglycemia symptoms include headaches (chronic) and mood changes. Pertinent negatives for hypoglycemia include no confusion, dizziness, hunger, nervousness/anxiousness, pallor, sleepiness, speech difficulty, sweats or tremors. There are no diabetic associated symptoms. Pertinent negatives for diabetes include no blurred vision, no chest pain, no foot paresthesias, no foot ulcerations, no polydipsia, no polyphagia and no polyuria. There are no hypoglycemic complications. Pertinent negatives for hypoglycemia complications include no blackouts, no hospitalization, no nocturnal hypoglycemia, no required assistance and no required glucagon injection. Symptoms are stable. Diabetic complications include impotence. Pertinent negatives for diabetic complications include no autonomic neuropathy, CVA, heart disease,  nephropathy, peripheral neuropathy, PVD or retinopathy. Risk factors for coronary artery disease include obesity and stress. Current diabetic treatment includes insulin injections. He is compliant with treatment most of the time. He is currently taking insulin pre-breakfast. Insulin injections are given by patient. Rotation sites for injection include the abdominal wall. His weight is stable. He is following a diabetic diet. When asked about meal planning, he reported none. He has not had a previous visit with a dietitian. He participates in exercise daily. He monitors blood glucose at home 1-2 x per day. He monitors urine at home <1 x per month. Blood glucose monitoring compliance is adequate. There is no change in his home blood glucose trend. His breakfast blood glucose is taken between 7-8 am. His breakfast blood glucose range is generally 130-140 mg/dl. His lunch blood glucose is taken between 11-12 pm. His lunch blood glucose range is generally 110-130 mg/dl. His dinner blood glucose is taken between 5-6 pm. His dinner blood glucose range is generally 110-130 mg/dl. His overall blood glucose range is 140-180 mg/dl. An ACE inhibitor/angiotensin II receptor blocker is not being taken. He does not see a podiatrist.Eye exam is current.   Hypertension  This is a chronic problem. The current episode started more than 1 year ago. The problem is uncontrolled. Associated symptoms include headaches (chronic) and neck pain. Pertinent negatives include no anxiety, blurred vision, chest pain, malaise/fatigue, orthopnea, palpitations, peripheral edema, PND, shortness of breath or sweats. Risk factors for coronary artery disease include family history, obesity and smoking/tobacco exposure. Current antihypertension treatment includes ACE inhibitors. The current treatment provides moderate improvement. Compliance problems include exercise, psychosocial issues, medication side effects, medication cost and diet.  There is no  history of CVA, PVD or retinopathy.   Depression  Visit Type: follow-up (following psych.  Feels he is much improved and stable)  Patient presents with the following symptoms: impotence.  Patient is not experiencing: confusion, nervousness/anxiety, palpitations and shortness of breath.    Back Pain  This is a new problem. The current episode started 1 to 4 weeks ago. The problem occurs constantly. The problem has been waxing and waning since onset. The pain is present in the thoracic spine. The quality of the pain is described as shooting. The pain is at a severity of 8/10. The pain is the same all the time. The symptoms are aggravated by bending, position, lying down, sitting, standing and twisting. Stiffness is present in the morning. Associated symptoms include headaches (chronic) and numbness. Pertinent negatives include no abdominal pain, bladder incontinence, bowel incontinence, chest pain, dysuria, leg pain, paresis, paresthesias, pelvic pain, perianal numbness or tingling. Risk factors include lack of exercise and obesity.      The following portions of the patient's history were reviewed and updated as appropriate: allergies, current medications, past family history, past medical history, past social history, past surgical history and problem list.      Review of Systems   Constitutional: Negative for activity change and malaise/fatigue.   Eyes: Negative for blurred vision.   Respiratory: Negative.  Negative for shortness of breath.    Cardiovascular: Negative.  Negative for chest pain, palpitations, orthopnea and PND.   Gastrointestinal: Negative for abdominal pain and bowel incontinence.   Endocrine: Negative for polydipsia, polyphagia and polyuria.   Genitourinary: Positive for impotence. Negative for bladder incontinence, dysuria and pelvic pain.   Musculoskeletal: Positive for back pain and neck pain.   Skin: Negative for pallor.   Neurological: Positive for numbness and headaches (chronic). Negative  "for dizziness, tingling, tremors, syncope, facial asymmetry, speech difficulty, light-headedness and paresthesias.   Psychiatric/Behavioral: Negative for agitation, behavioral problems, confusion, self-injury and sleep disturbance. The patient is not nervous/anxious.    All other systems reviewed and are negative.      Procedures    Vitals: Blood pressure 130/92, pulse 75, temperature 98.2 °F (36.8 °C), temperature source Temporal, height 177.8 cm (70\"), weight 121 kg (267 lb), SpO2 98 %.     Allergies:   Allergies   Allergen Reactions   • Statins Other (See Comments)     All over pain,    • Zofran [Ondansetron] Nausea And Vomiting   • Zoloft [Sertraline Hcl] Nausea And Vomiting          Objective   Physical Exam   Constitutional: He is oriented to person, place, and time. He appears well-developed. No distress.   HENT:   Head: Normocephalic and atraumatic.   Right Ear: Hearing, tympanic membrane, external ear and ear canal normal.   Left Ear: Hearing, tympanic membrane, external ear and ear canal normal.   Nose: Nose normal. No mucosal edema or rhinorrhea. Right sinus exhibits no maxillary sinus tenderness and no frontal sinus tenderness. Left sinus exhibits no maxillary sinus tenderness and no frontal sinus tenderness.   Mouth/Throat: Uvula is midline. No oropharyngeal exudate. Tonsils are 0 on the right. Tonsils are 0 on the left. No tonsillar exudate.   Eyes: Pupils are equal, round, and reactive to light. Conjunctivae are normal. Right eye exhibits no discharge. Left eye exhibits no discharge.   Neck: No thyromegaly present.   Cardiovascular: Normal rate, regular rhythm and normal heart sounds.   No murmur heard.  Pulmonary/Chest: Effort normal and breath sounds normal.   Musculoskeletal:      Right hip: Normal.      Left hip: Normal.      Right knee: Normal.      Cervical back: Normal.   Lymphadenopathy:     He has no cervical adenopathy.   Neurological: He is alert and oriented to person, place, and time. He " has normal reflexes. He displays normal reflexes. No cranial nerve deficit or sensory deficit. He exhibits normal muscle tone. Coordination normal.   Skin: Skin is warm and dry. He is not diaphoretic.   Psychiatric: His behavior is normal. Judgment and thought content normal.   Nursing note and vitals reviewed.      Assessment & Plan   Discussed with patient impression and plan, patient verbalizes understanding.        During this visit the following were done:  Labs Reviewed [x]    Labs Ordered []    Radiology Reports Reviewed []    Radiology Ordered [x]    PCP Records Reviewed []    Referring Provider Records Reviewed []    ER Records Reviewed []    Hospital Records Reviewed []    History Obtained From Family []    Radiology Images Reviewed []    Other Reviewed []    Records Requested [x]      Diagnoses and all orders for this visit:    1. Neck pain (Primary)  -     XR Spine Cervical Complete 4 or 5 View (In Office)  -     XR Spine Thoracic 2 View (In Office)  -     methylPREDNISolone (MEDROL) 4 MG dose pack; Take as directed on package instructions.  Dispense: 21 tablet; Refill: 0  -     dexamethasone (DECADRON) injection 8 mg    2. Type 2 diabetes mellitus with hyperglycemia, with long-term current use of insulin ([facility])  -     Dulaglutide (Trulicity) 1.5 MG/0.5ML solution pen-injector; Inject 1.5 mg under the skin 1 (One) Time Per Week.  Dispense: 9 mL; Refill: 1    3. Hyperlipidemia, unspecified hyperlipidemia type  -     fenofibrate (TRICOR) 145 MG tablet; Take 1 tablet by mouth Daily.  Dispense: 90 tablet; Refill: 1    4. Essential hypertension  -     metoprolol tartrate (LOPRESSOR) 25 MG tablet; Take 1 tablet by mouth Every 12 (Twelve) Hours.  Dispense: 180 tablet; Refill: 1    5. Chronic daily headache    6. Bipolar II disorder (HCC)      Class 2 Severe Obesity (BMI >=35 and <=39.9). Obesity-related health conditions include the following: hypertension, diabetes mellitus, dyslipidemias and GERD. Obesity is  unchanged. BMI is is above average; BMI management plan is completed. We discussed portion control and increasing exercise.            Patient is a non smoker    Patient's Body mass index is 38.31 kg/m². BMI is above normal parameters. Recommendations include: exercise counseling and nutrition counseling.               Physical Exam  Vitals and nursing note reviewed.   Constitutional:       General: He is not in acute distress.     Appearance: He is well-developed. He is not diaphoretic.   HENT:      Head: Normocephalic and atraumatic.      Right Ear: Hearing, tympanic membrane, ear canal and external ear normal.      Left Ear: Hearing, tympanic membrane, ear canal and external ear normal.      Nose: Nose normal. No mucosal edema or rhinorrhea.      Right Sinus: No maxillary sinus tenderness or frontal sinus tenderness.      Left Sinus: No maxillary sinus tenderness or frontal sinus tenderness.      Mouth/Throat:      Pharynx: Uvula midline. No oropharyngeal exudate.      Tonsils: No tonsillar exudate. 0 on the right. 0 on the left.   Eyes:      General:         Right eye: No discharge.         Left eye: No discharge.      Conjunctiva/sclera: Conjunctivae normal.      Pupils: Pupils are equal, round, and reactive to light.   Neck:      Thyroid: No thyromegaly.   Cardiovascular:      Rate and Rhythm: Normal rate and regular rhythm.      Heart sounds: Normal heart sounds. No murmur heard.  Pulmonary:      Effort: Pulmonary effort is normal.      Breath sounds: Normal breath sounds.   Musculoskeletal:      Cervical back: Neck supple. Normal.      Right hip: Normal.      Left hip: Normal.      Right knee: Normal.   Lymphadenopathy:      Cervical: No cervical adenopathy.   Skin:     General: Skin is warm and dry.   Neurological:      General: No focal deficit present.      Mental Status: He is alert and oriented to person, place, and time.      Cranial Nerves: No cranial nerve deficit.      Sensory: No sensory deficit.       Motor: No abnormal muscle tone.      Coordination: Coordination normal.      Deep Tendon Reflexes: Reflexes are normal and symmetric. Reflexes normal.   Psychiatric:         Behavior: Behavior normal.         Thought Content: Thought content normal.         Judgment: Judgment normal.            Diagnoses and all orders for this visit:    1. Neck pain (Primary)  -     XR Spine Cervical Complete 4 or 5 View (In Office)  -     XR Spine Thoracic 2 View (In Office)  -     methylPREDNISolone (MEDROL) 4 MG dose pack; Take as directed on package instructions.  Dispense: 21 tablet; Refill: 0  -     dexamethasone (DECADRON) injection 8 mg    2. Type 2 diabetes mellitus with hyperglycemia, with long-term current use of insulin ([facility])  -     Dulaglutide (Trulicity) 1.5 MG/0.5ML solution pen-injector; Inject 1.5 mg under the skin 1 (One) Time Per Week.  Dispense: 9 mL; Refill: 1    3. Hyperlipidemia, unspecified hyperlipidemia type  -     fenofibrate (TRICOR) 145 MG tablet; Take 1 tablet by mouth Daily.  Dispense: 90 tablet; Refill: 1    4. Essential hypertension  -     metoprolol tartrate (LOPRESSOR) 25 MG tablet; Take 1 tablet by mouth Every 12 (Twelve) Hours.  Dispense: 180 tablet; Refill: 1    5. Chronic daily headache    6. Bipolar II disorder ([facility])      Class 2 Severe Obesity (BMI >=35 and <=39.9). Obesity-related health conditions include the following: hypertension, diabetes mellitus and dyslipidemias. Obesity is unchanged. BMI is is above average; BMI management plan is completed. We discussed portion control and increasing exercise.    Answers for HPI/ROS submitted by the patient on 5/23/2022  What is the primary reason for your visit?: Diabetes

## 2022-11-22 ENCOUNTER — TELEPHONE (OUTPATIENT)
Dept: FAMILY MEDICINE CLINIC | Facility: CLINIC | Age: 47
End: 2022-11-22

## 2022-11-22 DIAGNOSIS — M47.814 THORACIC ARTHRITIS: Primary | ICD-10-CM

## 2022-11-22 NOTE — TELEPHONE ENCOUNTER
----- Message from LAUREN Majano sent at 11/22/2022  9:46 AM EST -----  Cervical spine is ok.  Thoracic spine shows multiple levels of degenerative disc disease which is wear and tear.  I recommend PT if he will agree      Spoke with patient & he verbalized understanding,agreeable to PT,no preference.

## 2022-11-30 DIAGNOSIS — M54.2 NECK PAIN: Primary | ICD-10-CM

## 2022-12-05 ENCOUNTER — OFFICE VISIT (OUTPATIENT)
Dept: FAMILY MEDICINE CLINIC | Facility: CLINIC | Age: 47
End: 2022-12-05

## 2022-12-05 VITALS
TEMPERATURE: 98.4 F | DIASTOLIC BLOOD PRESSURE: 88 MMHG | SYSTOLIC BLOOD PRESSURE: 118 MMHG | OXYGEN SATURATION: 98 % | HEIGHT: 70 IN | BODY MASS INDEX: 37.11 KG/M2 | HEART RATE: 108 BPM | WEIGHT: 259.2 LBS

## 2022-12-05 DIAGNOSIS — M47.814 THORACIC ARTHRITIS: Primary | ICD-10-CM

## 2022-12-05 DIAGNOSIS — M54.2 NECK PAIN: ICD-10-CM

## 2022-12-05 PROCEDURE — 96372 THER/PROPH/DIAG INJ SC/IM: CPT | Performed by: NURSE PRACTITIONER

## 2022-12-05 PROCEDURE — 99214 OFFICE O/P EST MOD 30 MIN: CPT | Performed by: NURSE PRACTITIONER

## 2022-12-05 RX ORDER — DEXAMETHASONE SODIUM PHOSPHATE 4 MG/ML
4 INJECTION, SOLUTION INTRA-ARTICULAR; INTRALESIONAL; INTRAMUSCULAR; INTRAVENOUS; SOFT TISSUE ONCE
Status: COMPLETED | OUTPATIENT
Start: 2022-12-05 | End: 2022-12-05

## 2022-12-05 RX ORDER — HYDROCODONE BITARTRATE AND ACETAMINOPHEN 5; 325 MG/1; MG/1
1 TABLET ORAL DAILY PRN
Qty: 12 TABLET | Refills: 0 | Status: SHIPPED | OUTPATIENT
Start: 2022-12-05

## 2022-12-05 RX ORDER — KETOROLAC TROMETHAMINE 30 MG/ML
60 INJECTION, SOLUTION INTRAMUSCULAR; INTRAVENOUS ONCE
Status: COMPLETED | OUTPATIENT
Start: 2022-12-05 | End: 2022-12-05

## 2022-12-05 RX ADMIN — KETOROLAC TROMETHAMINE 60 MG: 30 INJECTION, SOLUTION INTRAMUSCULAR; INTRAVENOUS at 14:48

## 2022-12-05 RX ADMIN — DEXAMETHASONE SODIUM PHOSPHATE 4 MG: 4 INJECTION, SOLUTION INTRA-ARTICULAR; INTRALESIONAL; INTRAMUSCULAR; INTRAVENOUS; SOFT TISSUE at 14:47

## 2022-12-05 NOTE — PROGRESS NOTES
"Chief Complaint  Neck Pain    Subjective        Thomas Mireles presents to [facility]  Neck Pain   This is a new problem. The current episode started more than 1 month ago. The problem occurs constantly. The problem has been waxing and waning. The pain is associated with an unknown factor. Pain location: posterior and upper back  The pain is at a severity of 5/10. The pain is moderate. The symptoms are aggravated by coughing, position, twisting and bending. The pain is same all the time. Associated symptoms include headaches, numbness, tingling and weakness. Pertinent negatives include no chest pain, fever, leg pain, paresis or weight loss.   Back Pain  This is a recurrent problem. The current episode started 1 to 4 weeks ago. The problem occurs constantly. The problem has been waxing and waning since onset. The pain is present in the thoracic spine. The quality of the pain is described as aching, burning, shooting and stabbing. The pain is at a severity of 8/10. The pain is the same all the time. The symptoms are aggravated by bending, position, lying down, sitting, standing and stress. Stiffness is present all day. Associated symptoms include headaches, numbness, paresthesias, tingling and weakness. Pertinent negatives include no abdominal pain, bladder incontinence, bowel incontinence, chest pain, dysuria, fever, leg pain, paresis, pelvic pain, perianal numbness or weight loss. Risk factors include obesity.       Objective   Vital Signs:  /88 (BP Location: Right arm, Patient Position: Sitting)   Pulse 108   Temp 98.4 °F (36.9 °C) (Temporal)   Ht 177.8 cm (70\")   Wt 118 kg (259 lb 3.2 oz)   SpO2 98%   BMI 37.19 kg/m²   Estimated body mass index is 37.19 kg/m² as calculated from the following:    Height as of this encounter: 177.8 cm (70\").    Weight as of this encounter: 118 kg (259 lb 3.2 oz).          Physical Exam  Vitals and nursing note reviewed. "   Constitutional:       General: He is not in acute distress.     Appearance: He is well-developed. He is not ill-appearing.   HENT:      Head: Normocephalic.   Cardiovascular:      Rate and Rhythm: Normal rate and regular rhythm.      Heart sounds: Normal heart sounds. No murmur heard.  Pulmonary:      Effort: Pulmonary effort is normal.      Breath sounds: Normal breath sounds.   Musculoskeletal:         General: Tenderness present. No deformity.      Cervical back: Bony tenderness present. Pain with movement present. Decreased range of motion.      Thoracic back: Bony tenderness present.      Right lower leg: No edema.      Left lower leg: No edema.   Skin:     General: Skin is warm and dry.   Neurological:      Mental Status: He is alert and oriented to person, place, and time.   Psychiatric:         Behavior: Behavior normal.        Result Review :                Assessment and Plan   Diagnoses and all orders for this visit:    1. Thoracic arthritis (Primary)  -     MRI Cervical Spine Without Contrast; Future  -     HYDROcodone-acetaminophen (NORCO) 5-325 MG per tablet; Take 1 tablet by mouth Daily As Needed for Moderate Pain.  Dispense: 12 tablet; Refill: 0  -     MRI Thoracic Spine Without Contrast; Future  -     ketorolac (TORADOL) injection 60 mg  -     dexamethasone (DECADRON) injection 4 mg    2. Neck pain  -     MRI Cervical Spine Without Contrast; Future  -     HYDROcodone-acetaminophen (NORCO) 5-325 MG per tablet; Take 1 tablet by mouth Daily As Needed for Moderate Pain.  Dispense: 12 tablet; Refill: 0  -     MRI Thoracic Spine Without Contrast; Future  -     ketorolac (TORADOL) injection 60 mg  -     dexamethasone (DECADRON) injection 4 mg             Follow Up   Return in about 2 years (around 12/5/2024), or if symptoms worsen or fail to improve, for Recheck.  Patient was given instructions and counseling regarding his condition or for health maintenance advice. Please see specific information pulled  into the AVS if appropriate.

## 2022-12-14 ENCOUNTER — TELEPHONE (OUTPATIENT)
Dept: FAMILY MEDICINE CLINIC | Facility: CLINIC | Age: 47
End: 2022-12-14

## 2022-12-14 NOTE — TELEPHONE ENCOUNTER
[employer] Author[employer] team called stating Patients MRI T spine and C Spine were denied and are requesting peer to peer. Phone number for that [phone] if interested. Thank you.

## 2022-12-27 ENCOUNTER — APPOINTMENT (OUTPATIENT)
Dept: MRI IMAGING | Facility: HOSPITAL | Age: 47
End: 2022-12-27

## 2022-12-30 ENCOUNTER — TELEPHONE (OUTPATIENT)
Dept: FAMILY MEDICINE CLINIC | Facility: CLINIC | Age: 47
End: 2022-12-30
Payer: COMMERCIAL

## 2023-02-21 DIAGNOSIS — R51.9 CHRONIC DAILY HEADACHE: ICD-10-CM

## 2023-02-21 DIAGNOSIS — Z79.4 TYPE 2 DIABETES MELLITUS WITH HYPERGLYCEMIA, WITH LONG-TERM CURRENT USE OF INSULIN: ICD-10-CM

## 2023-02-21 DIAGNOSIS — F51.05 INSOMNIA DUE TO MENTAL DISORDER: ICD-10-CM

## 2023-02-21 DIAGNOSIS — E11.65 TYPE 2 DIABETES MELLITUS WITH HYPERGLYCEMIA, WITH LONG-TERM CURRENT USE OF INSULIN: ICD-10-CM

## 2023-02-21 DIAGNOSIS — F31.81 BIPOLAR II DISORDER: ICD-10-CM

## 2023-02-21 DIAGNOSIS — M25.50 ARTHRALGIA, UNSPECIFIED JOINT: ICD-10-CM

## 2023-02-21 RX ORDER — LISINOPRIL 10 MG/1
10 TABLET ORAL DAILY
Qty: 90 TABLET | Refills: 1 | Status: SHIPPED | OUTPATIENT
Start: 2023-02-21

## 2023-02-21 RX ORDER — ASPIRIN 81 MG/1
81 TABLET ORAL DAILY
Qty: 90 TABLET | Refills: 1 | Status: SHIPPED | OUTPATIENT
Start: 2023-02-21

## 2023-02-21 RX ORDER — PANTOPRAZOLE SODIUM 40 MG/1
40 TABLET, DELAYED RELEASE ORAL DAILY
Qty: 90 TABLET | Refills: 1 | Status: SHIPPED | OUTPATIENT
Start: 2023-02-21

## 2023-02-21 RX ORDER — CYCLOBENZAPRINE HCL 5 MG
5 TABLET ORAL NIGHTLY PRN
Qty: 90 TABLET | Refills: 1 | Status: SHIPPED | OUTPATIENT
Start: 2023-02-21

## 2023-02-23 RX ORDER — TRAZODONE HYDROCHLORIDE 100 MG/1
150 TABLET ORAL NIGHTLY
Qty: 45 TABLET | Refills: 2 | Status: SHIPPED | OUTPATIENT
Start: 2023-02-23 | End: 2023-03-20 | Stop reason: SDUPTHER

## 2023-02-23 RX ORDER — HYDROXYZINE HYDROCHLORIDE 10 MG/1
10-20 TABLET, FILM COATED ORAL 3 TIMES DAILY PRN
Qty: 60 TABLET | Refills: 2 | Status: SHIPPED | OUTPATIENT
Start: 2023-02-23 | End: 2023-03-20 | Stop reason: SDUPTHER

## 2023-02-23 RX ORDER — ZIPRASIDONE HYDROCHLORIDE 80 MG/1
80 CAPSULE ORAL 2 TIMES DAILY WITH MEALS
Qty: 60 CAPSULE | Refills: 2 | Status: SHIPPED | OUTPATIENT
Start: 2023-02-23 | End: 2023-03-20 | Stop reason: SDUPTHER

## 2023-03-17 NOTE — PROGRESS NOTES
"Subjective   Thomas Mireles is a 47 y.o. male who presents today for follow up    Chief Complaint:  Bipolar disorder, insomnia    History of Present Illness:   History of Present Illness  Today is a follow up visit. Patient is taking medication as directed, denies side effects. He states things are going \"surprisingly well\". He continues to work as a cook.  Depression rated 3/10, anxiety rated 3/10, with 10 being the worst.   Sleep is ok, getting about 6-7 hours a night, denies NM.   Appetite is ok, eats once a day.  Denies SI/HI/AVH.  Denies thoughts of self-harm.   Chronic health issues, no acute physical or medical issues today  Prior medications include Zoloft (bad reaction), seroquel (caused him feel like a zombie), haldol (caused vazquez), topamax (worked for a while), lithium (tremors, weight gain, affected blood pressure).        The following portions of the patient's history were reviewed and updated as appropriate: allergies, current medications, past family history, past medical history, past social history, past surgical history and problem list.      Past Medical History:  Past Medical History:   Diagnosis Date   • Anxiety    • Bipolar 1 disorder (HCC)    • Colon polyp 2022   • Constipation    • Depression    • Head injury    • HL (hearing loss)    • Hyperlipidemia    • Hypertension    • Kidney stone 2018   • Migraine    • Obesity    • Psychiatric illness    • Suicide attempt (HCC)    • Type 2 diabetes mellitus (HCC)        Social History:  Social History     Socioeconomic History   • Marital status: Single   Tobacco Use   • Smoking status: Former     Packs/day: 2.00     Years: 15.00     Pack years: 30.00     Types: Cigarettes     Start date: 1993     Quit date: 3/6/2009     Years since quittin.0   • Smokeless tobacco: Never   Vaping Use   • Vaping Use: Never used   Substance and Sexual Activity   • Alcohol use: Yes     Alcohol/week: 3.0 standard drinks     Types: 3 Cans of beer " per week     Comment: Very seldom.   • Drug use: No   • Sexual activity: Not Currently     Partners: Female     Birth control/protection: Condom, Abstinence       Family History:  Family History   Problem Relation Age of Onset   • Hypertension Mother    • Hypertension Father    • Diabetes Maternal Grandfather    • Heart disease Maternal Grandfather    • Mental illness Maternal Grandfather         PTSD   • Liver cancer Paternal Grandfather    • Asthma Paternal Grandfather          in .   • Diabetes Paternal Grandfather        Past Surgical History:  Past Surgical History:   Procedure Laterality Date   • COLONOSCOPY N/A 2021    Procedure: COLONOSCOPY;  Surgeon: Judy Grant MD;  Location: [facility] OR;  Service: Gastroenterology;  Laterality: N/A;   • COLONOSCOPY N/A 2022    Procedure: COLONOSCOPY;  Surgeon: Judy Grant MD;  Location: [facility] OR;  Service: Gastroenterology;  Laterality: N/A;  TATTOOED PORTION OF SIGMOID COLON   • KNEE SURGERY      right   • NASAL POLYP EXCISION     • URETHRAL DILATATION      infant       Problem List:  Patient Active Problem List   Diagnosis   • Hypertension   • Hyperlipidemia   • Type 2 diabetes mellitus with hyperglycemia (HCC)   • Bipolar II disorder (HCC)   • Rosacea   • Abnormal EKG   • Old myocardial infarction by EKG criteria.   • Dyspnea on exertion (class 2-3)   • Head injury   • Chronic idiopathic constipation   • Encounter for colorectal cancer screening       Allergy:   Allergies   Allergen Reactions   • Statins Other (See Comments)     All over pain,    • Zofran [Ondansetron] Nausea And Vomiting   • Zoloft [Sertraline Hcl] Nausea And Vomiting        Current Medications:   Current Outpatient Medications   Medication Sig Dispense Refill   • Accu-Chek Guide test strip USE AS DIRECTED EVERY DAY 60 each 11   • Accu-Chek Softclix Lancets lancets USE TO CHECK BLOOD GLUCOSE EVERY  each 1   • aspirin (Aspirin Low Dose) 81  MG EC tablet Take 1 tablet by mouth Daily. 90 tablet 1   • cyclobenzaprine (FLEXERIL) 5 MG tablet Take 1 tablet by mouth At Night As Needed for Muscle Spasms. 90 tablet 1   • Dulaglutide (Trulicity) 1.5 MG/0.5ML solution pen-injector Inject 1.5 mg under the skin 1 (One) Time Per Week. 9 mL 1   • fenofibrate (TRICOR) 145 MG tablet Take 1 tablet by mouth Daily. 90 tablet 1   • FLUoxetine (PROzac) 10 MG capsule Take 1 capsule by mouth Daily. 30 capsule 2   • glucose blood test strip For daily testing  E11.65 100 each 11   • glucose monitor monitoring kit 1 each As Needed (use to check sugar once daily). 1 each 0   • HYDROcodone-acetaminophen (NORCO) 5-325 MG per tablet Take 1 tablet by mouth Daily As Needed for Moderate Pain. 12 tablet 0   • hydrOXYzine (ATARAX) 10 MG tablet Take 1 to 2 tablets by mouth 3 (Three) Times a Day As Needed for Anxiety (insomnia). 60 tablet 2   • lisinopril (PRINIVIL,ZESTRIL) 10 MG tablet Take 1 tablet by mouth Daily. 90 tablet 1   • lubiprostone (AMITIZA) 24 MCG capsule Take 1 capsule by mouth 2 (Two) Times a Day With Meals. 60 capsule 11   • methylPREDNISolone (MEDROL) 4 MG dose pack Take as directed on package instructions. 21 tablet 0   • metoprolol tartrate (LOPRESSOR) 25 MG tablet Take 1 tablet by mouth Every 12 (Twelve) Hours. 180 tablet 1   • metroNIDAZOLE (METROGEL) 0.75 % gel Apply  topically to the appropriate area as directed 2 (Two) Times a Day. 45 g 5   • naproxen (Naprosyn) 500 MG tablet Take 1 tablet by mouth 2 (Two) Times a Day With Meals. 180 tablet 1   • Omega-3 Fatty Acids (fish oil) 1000 MG capsule capsule Take 1 capsule by mouth 2 (Two) Times a Day With Meals. 180 capsule 1   • pantoprazole (PROTONIX) 40 MG EC tablet Take 1 tablet by mouth Daily. 90 tablet 1   • traZODone (DESYREL) 100 MG tablet Take 1 & 1/2 tablets by mouth Every Night. 45 tablet 2   • triamcinolone (KENALOG) 0.1 % cream Apply 1 application topically to the appropriate area as directed 2 (Two) Times a  "Day. 80 g 2   • ziprasidone (Geodon) 80 MG capsule Take 1 capsule by mouth 2 (Two) Times a Day With Meals. 60 capsule 2     No current facility-administered medications for this visit.       Review of Symptoms:    Review of Systems   Psychiatric/Behavioral: Positive for dysphoric mood, sleep disturbance and depressed mood. Negative for hallucinations, self-injury and suicidal ideas. The patient is nervous/anxious.    All other systems reviewed and are negative.      Objective   Physical Exam:   Blood pressure 160/80, pulse 52, height 177.8 cm (70\"), weight 120 kg (265 lb).  Body mass index is 38.02 kg/m².    Appearance:  male appears stated age, no acute distress noted.    Gait, Station, Strength: Steady, posture erect, WNL      Mental Status Exam: 3/20/23  Hygiene:   good  Cooperation:  Cooperative  Eye Contact:  Good  Psychomotor Behavior:  Appropriate  Affect:  Appropriate  Mood: normal  Hopelessness: Denies  Speech:  Normal  Thought Process:  Linear  Thought Content:  Mood congruent  Suicidal:  None  Homicidal:  None  Hallucinations:  None  Delusion:  None  Memory:  Intact  Orientation:  Person, Place, Time and Situation  Reliability:  fair  Insight:  Fair  Judgement:  Fair  Impulse Control:  Fair       PHQ-Score Total:  PHQ-9 Total Score:      Lab Results:   No visits with results within 1 Month(s) from this visit.   Latest known visit with results is:   Admission on 2022, Discharged on 2022   Component Date Value Ref Range Status   • Reference Lab Report 2022    Final                    Value:[facility]  [address] [place] [address]  Phone: [phone] or [phone]  Fax: [phone]  Nael Cunningham M.D., Medical Director    PATIENT NAME                           LABORATORY NO.  [id number]  COLBY MILLIGAN                      [id number]  [id number]                         AGE              SEX  SSN           CLIENT REF #  [facility] " "[place]              46      1975      [id number]   [id number]    [address]                     REQUESTING M.D.     [place] [address]                   BEBO PORTER  DATE COLLECTED      DATE RECEIVED      DATE REPORTED  09/14/2022          09/14/2022         09/15/2022    DIAGNOSIS:  A.   TRANSVERSE COLON POLYP:  Nondiagnostic specimen; tissue did not survive processing; rare fecal  material present  B.   DESCENDING COLON POLYP:  Tubular adenoma  No high grade dysplasia identified  C.   SIGMOID COLON POLYP:  Tubular adenoma, multiple fragments  No high grade                           dysplasia identified  D.   RECTAL POLYP:  Tubular adenoma and hyperplastic polyp  No high grade dysplasia identified    CLINICAL HISTORY:  Chronic idiopathic constipation, encounter for colorectal cancer screening    SPECIMENS RECEIVED:  A.  TRANSVERSE COLON POLYP  B.  DESCENDING COLON POLYP  C.  SIGMOID COLON POLYP  D.  RECTAL POLYP    MICROSCOPIC DESCRIPTION:  Tissue blocks are prepared and slides are examined microscopically on all  specimens. See diagnosis for details.    Professional interpretation rendered by Camila Aldridge D.O., F.C.A.P. at  [facility] [employer], [address] [place] [address].    GROSS DESCRIPTION:  A.  Specimen is received in 1 formalin filled container \"large intestine,  transverse colon\". Specimen consists of a scant amount of possible tissue  measuring less than 0.1 cc in aggregate.  The specimen is filtered and dyed  with eosin in an attempt to recover all tissue, and is submitted entirely in  one cassette. MM  B.  Pieces: 2.  Aggregate dimensions: 0.3                           x 0.3 x 0.1 cm.  Cassettes:  One,  entirely submitted.  C.  Pieces: Multiple.  Aggregate dimensions: 1.8 x 0.9 x 0.3 cm.  Cassettes:  One, entirely submitted.  D.  Pieces: 3.  Aggregate dimensions: 0.3 x 0.2 x 0.1 cm.  Cassettes:  One,  entirely submitted.  MM    REVIEWED, DIAGNOSED AND " ELECTRONICALLY  SIGNED BY:    Camila Aldridge D.O., F.C.AMILKA.  CPT CODES:  88305x4         Assessment & Plan   Problems Addressed this Visit        Mental Health    Bipolar II disorder (HCC) - Primary    Relevant Medications    ziprasidone (Geodon) 80 MG capsule    traZODone (DESYREL) 100 MG tablet    hydrOXYzine (ATARAX) 10 MG tablet    FLUoxetine (PROzac) 10 MG capsule   Other Visit Diagnoses     LUIS (generalized anxiety disorder)        Relevant Medications    ziprasidone (Geodon) 80 MG capsule    traZODone (DESYREL) 100 MG tablet    hydrOXYzine (ATARAX) 10 MG tablet    FLUoxetine (PROzac) 10 MG capsule    Other insomnia        Medication management        Insomnia due to mental disorder        Relevant Medications    ziprasidone (Geodon) 80 MG capsule    traZODone (DESYREL) 100 MG tablet    hydrOXYzine (ATARAX) 10 MG tablet    FLUoxetine (PROzac) 10 MG capsule    Bipolar I disorder, most recent episode depressed with melancholic features (HCC)        Relevant Medications    ziprasidone (Geodon) 80 MG capsule    traZODone (DESYREL) 100 MG tablet    hydrOXYzine (ATARAX) 10 MG tablet    FLUoxetine (PROzac) 10 MG capsule      Diagnoses       Codes Comments    Bipolar II disorder (HCC)    -  Primary ICD-10-CM: F31.81  ICD-9-CM: 296.89     LUIS (generalized anxiety disorder)     ICD-10-CM: F41.1  ICD-9-CM: 300.02     Other insomnia     ICD-10-CM: G47.09  ICD-9-CM: 780.52     Medication management     ICD-10-CM: Z79.899  ICD-9-CM: V58.69     Insomnia due to mental disorder     ICD-10-CM: F51.05  ICD-9-CM: 300.9, 327.02     Bipolar I disorder, most recent episode depressed with melancholic features (HCC)     ICD-10-CM: F31.30  ICD-9-CM: 296.50         Social History     Tobacco Use   Smoking Status Former   • Packs/day: 2.00   • Years: 15.00   • Pack years: 30.00   • Types: Cigarettes   • Start date: 1993   • Quit date: 3/6/2009   • Years since quittin.0   Smokeless Tobacco Never     GLENN reviewed and  appropriate. Patient counseled on use of controlled substances.     -The benefits of a healthy diet and exercise were discussed with patient, especially the positive effects they have on mental health. Patient encouraged to consider lifestyle modification regarding  diet and exercise patterns to maximize results of mental health treatment.  -Reviewed previous available documentation  -Reviewed most recent available labs   -Lengthy discussion with patient on the possible side effects of antipsychotic medications including increased cholesterol, increased blood sugar, and possibility of weight gain.  Also discussed the need to monitor lab work associated with this.  The risk of muscle movement disorders with this class of medication was also discussed.    -Prior medications include Zoloft (bad reaction), seroquel (caused him feel like a zombie), haldol (caused vazquez), topamax (worked for a while), lithium (tremors, weight gain, affected blood pressure).     Visit Diagnoses:    ICD-10-CM ICD-9-CM   1. Bipolar II disorder (HCC)  F31.81 296.89   2. LUIS (generalized anxiety disorder)  F41.1 300.02   3. Other insomnia  G47.09 780.52   4. Medication management  Z79.899 V58.69   5. Insomnia due to mental disorder  F51.05 300.9     327.02   6. Bipolar I disorder, most recent episode depressed with melancholic features (HCC)  F31.30 296.50         TREATMENT PLAN/GOALS: Continue supportive psychotherapy efforts and medications as indicated. Treatment and medication options discussed during today's visit. Patient acknowledged and verbally consented to continue with current treatment plan and was educated on the importance of compliance with treatment and follow-up appointments.    MEDICATION ISSUES:  Discussed medication options and treatment plan of prescribed medication as well as the risks, benefits, and side effects including potential falls, possible impaired driving and metabolic adversities among others. Patient is  agreeable to call the office with any worsening of symptoms or onset of side effects. Patient is agreeable to call 911 or go to the nearest ER should he/she begin having SI/HI.     MEDS ORDERED DURING VISIT:  New Medications Ordered This Visit   Medications   • ziprasidone (Geodon) 80 MG capsule     Sig: Take 1 capsule by mouth 2 (Two) Times a Day With Meals.     Dispense:  60 capsule     Refill:  2   • traZODone (DESYREL) 100 MG tablet     Sig: Take 1 & 1/2 tablets by mouth Every Night.     Dispense:  45 tablet     Refill:  2   • hydrOXYzine (ATARAX) 10 MG tablet     Sig: Take 1 to 2 tablets by mouth 3 (Three) Times a Day As Needed for Anxiety (insomnia).     Dispense:  60 tablet     Refill:  2   • FLUoxetine (PROzac) 10 MG capsule     Sig: Take 1 capsule by mouth Daily.     Dispense:  30 capsule     Refill:  2       Return in about 3 months (around 6/20/2023).     -Continue Geodon to 80 mg twice daily for depression and mood stabilization  -Continue Hydroxyzine HCL 10 mg to 20 mg three times per day as needed for anxiety and insomnia  -Continue Trazodone 150 mg daily at night for insomnia and depression.  -Continue Prozac 10 mg capsule daily, instructed to notify provider if manic symptoms occur.        Prognosis: Guarded dependent on medication/follow up and treatment plan compliance.  Functionality: pt showing improvements in important areas of daily functioning.     Short-term goals: Patient will adhere to medication regimen and note continued improvement in symptoms over the next 3 months.   Long-term goals: Patient will be adherent to medication management and psychotherapy with continued improvement in symptoms over the next 6 months    I spent 30 minutes caring for Thomas on this date of service. This time includes time spent by me in the following activities: preparing for the visit, obtaining and/or reviewing a separately obtained history, performing a medically appropriate examination and/or evaluation,  counseling and educating the patient/family/caregiver, ordering medications, tests, or procedures and documenting information in the medical record        This document has been electronically signed by LAUREN Sorenson   March 20, 2023 09:41 EDT    Part of this note may be an electronic transcription/translation of spoken language to printed text using the Dragon Dictation System.

## 2023-03-20 ENCOUNTER — OFFICE VISIT (OUTPATIENT)
Dept: PSYCHIATRY | Facility: CLINIC | Age: 48
End: 2023-03-20
Payer: COMMERCIAL

## 2023-03-20 VITALS
HEIGHT: 70 IN | HEART RATE: 52 BPM | DIASTOLIC BLOOD PRESSURE: 80 MMHG | BODY MASS INDEX: 37.94 KG/M2 | WEIGHT: 265 LBS | SYSTOLIC BLOOD PRESSURE: 160 MMHG

## 2023-03-20 DIAGNOSIS — F31.30: ICD-10-CM

## 2023-03-20 DIAGNOSIS — F41.1 GAD (GENERALIZED ANXIETY DISORDER): ICD-10-CM

## 2023-03-20 DIAGNOSIS — G47.09 OTHER INSOMNIA: ICD-10-CM

## 2023-03-20 DIAGNOSIS — Z79.899 MEDICATION MANAGEMENT: ICD-10-CM

## 2023-03-20 DIAGNOSIS — F51.05 INSOMNIA DUE TO MENTAL DISORDER: ICD-10-CM

## 2023-03-20 DIAGNOSIS — F31.81 BIPOLAR II DISORDER: Primary | ICD-10-CM

## 2023-03-20 PROCEDURE — 99214 OFFICE O/P EST MOD 30 MIN: CPT | Performed by: NURSE PRACTITIONER

## 2023-03-20 RX ORDER — ZIPRASIDONE HYDROCHLORIDE 80 MG/1
80 CAPSULE ORAL 2 TIMES DAILY WITH MEALS
Qty: 60 CAPSULE | Refills: 2 | Status: SHIPPED | OUTPATIENT
Start: 2023-03-20 | End: 2023-06-18

## 2023-03-20 RX ORDER — FLUOXETINE 10 MG/1
10 CAPSULE ORAL DAILY
Qty: 30 CAPSULE | Refills: 2 | Status: SHIPPED | OUTPATIENT
Start: 2023-03-20 | End: 2024-03-19

## 2023-03-20 RX ORDER — HYDROXYZINE HYDROCHLORIDE 10 MG/1
10-20 TABLET, FILM COATED ORAL 3 TIMES DAILY PRN
Qty: 60 TABLET | Refills: 2 | Status: SHIPPED | OUTPATIENT
Start: 2023-03-20 | End: 2023-06-18

## 2023-03-20 RX ORDER — TRAZODONE HYDROCHLORIDE 100 MG/1
150 TABLET ORAL NIGHTLY
Qty: 45 TABLET | Refills: 2 | Status: SHIPPED | OUTPATIENT
Start: 2023-03-20 | End: 2023-06-18

## 2023-04-13 ENCOUNTER — TELEPHONE (OUTPATIENT)
Dept: FAMILY MEDICINE CLINIC | Facility: CLINIC | Age: 48
End: 2023-04-13
Payer: COMMERCIAL

## 2023-06-08 NOTE — PROGRESS NOTES
Subjective   Thomas Mireles is a 47 y.o. male who presents today for follow up    Chief Complaint:  Bipolar disorder, insomnia    History of Present Illness:   History of Present Illness  Today is a follow-up visit.     Medication compliance: patient is compliant with medications, denies SE.  Depression rated 2/10, with 10 being the worst.  Anxiety rated 2/10, with 10 being the worst.  Mood rated 3/10, with 10 being the worst.  Sleep is fair, getting about 7 hours a night,  Nightmares: Denies  Appetite is good.  Hallucinations: Patient denies auditory hallucinations and visual hallucinations  Self-harm: Patient has passive thoughts of self harm, but denies plan or intent to harm themself.  Alcohol use: yes  Drug use: no  Marijuana use: no     Chronic health issues, no acute physical or medical issues today. He continues to work at [employer], it is going well.    Details:Has an occasional beer         The following portions of the patient's history were reviewed and updated as appropriate: allergies, current medications, past family history, past medical history, past social history, past surgical history and problem list.      Past Medical History:  Past Medical History:   Diagnosis Date    Anxiety     Bipolar 1 disorder     Colon polyp 2022    Constipation     Depression     Head injury     HL (hearing loss)     Hyperlipidemia     Hypertension     Kidney stone 2018    Migraine     Obesity     Psychiatric illness     Suicide attempt     Type 2 diabetes mellitus        Social History:  Social History     Socioeconomic History    Marital status: Single   Tobacco Use    Smoking status: Former     Packs/day: 2.00     Years: 15.00     Pack years: 30.00     Types: Cigarettes     Start date: 1993     Quit date: 3/6/2009     Years since quittin.2    Smokeless tobacco: Never   Vaping Use    Vaping Use: Never used   Substance and Sexual Activity    Alcohol use: Yes     Alcohol/week: 3.0  standard drinks     Types: 3 Cans of beer per week     Comment: Very seldom.    Drug use: No    Sexual activity: Not Currently     Partners: Female     Birth control/protection: Condom, Abstinence       Family History:  Family History   Problem Relation Age of Onset    Hypertension Mother     Hypertension Father     Diabetes Maternal Grandfather     Heart disease Maternal Grandfather     Mental illness Maternal Grandfather         PTSD    Liver cancer Paternal Grandfather     Asthma Paternal Grandfather          in .    Diabetes Paternal Grandfather        Past Surgical History:  Past Surgical History:   Procedure Laterality Date    COLONOSCOPY N/A 2021    Procedure: COLONOSCOPY;  Surgeon: Judy Grant MD;  Location: [facility] OR;  Service: Gastroenterology;  Laterality: N/A;    COLONOSCOPY N/A 2022    Procedure: COLONOSCOPY;  Surgeon: Judy Grant MD;  Location: [facility] OR;  Service: Gastroenterology;  Laterality: N/A;  TATTOOED PORTION OF SIGMOID COLON    KNEE SURGERY      right    NASAL POLYP EXCISION  2009    URETHRAL DILATATION      infant       Problem List:  Patient Active Problem List   Diagnosis    Hypertension    Hyperlipidemia    Type 2 diabetes mellitus with hyperglycemia    Bipolar II disorder    Rosacea    Abnormal EKG    Old myocardial infarction by EKG criteria.    Dyspnea on exertion (class 2-3)    Head injury    Chronic idiopathic constipation    Encounter for colorectal cancer screening       Allergy:   Allergies   Allergen Reactions    Statins Other (See Comments)     All over pain,     Zofran [Ondansetron] Nausea And Vomiting    Zoloft [Sertraline Hcl] Nausea And Vomiting        Current Medications:   Current Outpatient Medications   Medication Sig Dispense Refill    Accu-Chek Guide test strip USE AS DIRECTED EVERY DAY 60 each 11    Accu-Chek Softclix Lancets lancets USE TO CHECK BLOOD GLUCOSE EVERY  each 1    aspirin (Aspirin Low Dose) 81  MG EC tablet Take 1 tablet by mouth Daily. 90 tablet 1    cyclobenzaprine (FLEXERIL) 5 MG tablet Take 1 tablet by mouth At Night As Needed for Muscle Spasms. 90 tablet 1    Dulaglutide (Trulicity) 1.5 MG/0.5ML solution pen-injector Inject 1.5 mg under the skin 1 (One) Time Per Week. 9 mL 1    fenofibrate (TRICOR) 145 MG tablet Take 1 tablet by mouth Daily. 90 tablet 1    FLUoxetine (PROzac) 10 MG capsule Take 1 capsule by mouth Daily. 30 capsule 2    glucose blood test strip For daily testing  E11.65 100 each 11    glucose monitor monitoring kit 1 each As Needed (use to check sugar once daily). 1 each 0    hydrOXYzine (ATARAX) 10 MG tablet Take 1 to 2 tablets by mouth 3 (Three) Times a Day As Needed for Anxiety (insomnia). 60 tablet 2    lisinopril (PRINIVIL,ZESTRIL) 10 MG tablet Take 1 tablet by mouth Daily. 90 tablet 1    metoprolol tartrate (LOPRESSOR) 25 MG tablet Take 1 tablet by mouth Every 12 (Twelve) Hours. 180 tablet 1    metroNIDAZOLE (METROGEL) 0.75 % gel Apply  topically to the appropriate area as directed 2 (Two) Times a Day. 45 g 5    naproxen (Naprosyn) 500 MG tablet Take 1 tablet by mouth 2 (Two) Times a Day With Meals. 180 tablet 1    Omega-3 Fatty Acids (fish oil) 1000 MG capsule capsule Take 1 capsule by mouth 2 (Two) Times a Day With Meals. 180 capsule 1    pantoprazole (PROTONIX) 40 MG EC tablet Take 1 tablet by mouth Daily. 90 tablet 1    traZODone (DESYREL) 100 MG tablet Take 1 & 1/2 tablets by mouth Every Night. 45 tablet 2    triamcinolone (KENALOG) 0.1 % cream Apply 1 application topically to the appropriate area as directed 2 (Two) Times a Day. 80 g 2    ziprasidone (Geodon) 80 MG capsule Take 1 capsule by mouth 2 (Two) Times a Day With Meals. 60 capsule 2     No current facility-administered medications for this visit.       Review of Symptoms:    Review of Systems   Psychiatric/Behavioral:  Positive for dysphoric mood, sleep disturbance and depressed mood. Negative for agitation,  "hallucinations, self-injury, suicidal ideas and stress. The patient is nervous/anxious.    All other systems reviewed and are negative.    Objective   Physical Exam:   Blood pressure 132/90, pulse 80, height 177.8 cm (70\"), weight 127 kg (279 lb).  Body mass index is 40.03 kg/m².    Appearance:  male appears stated age, no acute distress noted.    Gait, Station, Strength: Steady, posture erect, WNL    23    MENTAL STATUS EXAM   General Appearance:  Cleanly groomed and dressed  Eye Contact:  Good eye contact  Attitude:  Cooperative  Motor Activity:  Normal gait, posture  Speech:  Normal rate, tone, volume  Language:  Spontaneous  Mood and affect:  Normal, pleasant  Hopelessness:  Denies  Thought Process:  Logical  Associations/ Thought Content:  No delusions  Hallucinations:  None  Suicidal Ideations:  Not present  Homicidal Ideation:  Not present  Sensorium:  Alert  Orientation:  Person, place, time and situation  Insight:  Fair  Judgement:  Fair  Reliability:  Fair  Impulse Control:  Fair       PHQ-Score Total:  PHQ-9 Total Score: 15    Lab Results:   No visits with results within 1 Month(s) from this visit.   Latest known visit with results is:   Admission on 2022, Discharged on 2022   Component Date Value Ref Range Status    Reference Lab Report 2022    Final                    Value:[facility]  [address] [place] [address]  Phone: [phone] or [phone]  Fax: [phone]  Nael Cunningham M.D., Medical Director    PATIENT NAME                           LABORATORY [id number]  [address]                      [id number]  [id number]                         AGE              SEX  N           CLIENT REF #  [facility]THA              46      1975      [id number]   [id number]    [address]                     REQUESTING M.D.     ATTENDING M.D.     COPY [place] [id number]                   BEBO PORTER  DATE " "COLLECTED      DATE RECEIVED      DATE REPORTED  09/14/2022          09/14/2022         09/15/2022    DIAGNOSIS:  A.   TRANSVERSE COLON POLYP:  Nondiagnostic specimen; tissue did not survive processing; rare fecal  material present  B.   DESCENDING COLON POLYP:  Tubular adenoma  No high grade dysplasia identified  C.   SIGMOID COLON POLYP:  Tubular adenoma, multiple fragments  No high grade                           dysplasia identified  D.   RECTAL POLYP:  Tubular adenoma and hyperplastic polyp  No high grade dysplasia identified    CLINICAL HISTORY:  Chronic idiopathic constipation, encounter for colorectal cancer screening    SPECIMENS RECEIVED:  A.  TRANSVERSE COLON POLYP  B.  DESCENDING COLON POLYP  C.  SIGMOID COLON POLYP  D.  RECTAL POLYP    MICROSCOPIC DESCRIPTION:  Tissue blocks are prepared and slides are examined microscopically on all  specimens. See diagnosis for details.    Professional interpretation rendered by Camila Aldridge D.O., [email or web address] at  [facility], [address] [place] [address].    GROSS DESCRIPTION:  A.  Specimen is received in 1 formalin filled container \"large intestine,  transverse colon\". Specimen consists of a scant amount of possible tissue  measuring less than 0.1 cc in aggregate.  The specimen is filtered and dyed  with eosin in an attempt to recover all tissue, and is submitted entirely in  one cassette. MM  B.  Pieces: 2.  Aggregate dimensions: 0.3                           x 0.3 x 0.1 cm.  Cassettes:  One,  entirely submitted.  C.  Pieces: Multiple.  Aggregate dimensions: 1.8 x 0.9 x 0.3 cm.  Cassettes:  One, entirely submitted.  D.  Pieces: 3.  Aggregate dimensions: 0.3 x 0.2 x 0.1 cm.  Cassettes:  One,  entirely submitted.  MM    REVIEWED, DIAGNOSED AND ELECTRONICALLY  SIGNED BY:    Camila Aldridge D.O., F.C.A.P.  CPT CODES:  88305x4         Assessment & Plan   Problems Addressed this Visit          Mental Health    Bipolar II disorder - Primary    Relevant " Medications    ziprasidone (Geodon) 80 MG capsule    traZODone (DESYREL) 100 MG tablet    hydrOXYzine (ATARAX) 10 MG tablet    FLUoxetine (PROzac) 10 MG capsule    Other Relevant Orders    PHARMACOGENOMICS PROFILE,BIOTAP - Swab,     Other Visit Diagnoses       LUIS (generalized anxiety disorder)        Relevant Medications    ziprasidone (Geodon) 80 MG capsule    traZODone (DESYREL) 100 MG tablet    hydrOXYzine (ATARAX) 10 MG tablet    FLUoxetine (PROzac) 10 MG capsule    Other Relevant Orders    PHARMACOGENOMICS PROFILE,BIOTAP - Swab,    Other insomnia        Relevant Orders    PHARMACOGENOMICS PROFILE,BIOTAP - Swab,    Medication management        Relevant Orders    PHARMACOGENOMICS PROFILE,BIOTAP - Swab,    Insomnia due to mental disorder        Relevant Medications    ziprasidone (Geodon) 80 MG capsule    traZODone (DESYREL) 100 MG tablet    hydrOXYzine (ATARAX) 10 MG tablet    FLUoxetine (PROzac) 10 MG capsule    Bipolar I disorder, most recent episode depressed with melancholic features        Relevant Medications    ziprasidone (Geodon) 80 MG capsule    traZODone (DESYREL) 100 MG tablet    hydrOXYzine (ATARAX) 10 MG tablet    FLUoxetine (PROzac) 10 MG capsule          Diagnoses         Codes Comments    Bipolar II disorder    -  Primary ICD-10-CM: F31.81  ICD-9-CM: 296.89     LUIS (generalized anxiety disorder)     ICD-10-CM: F41.1  ICD-9-CM: 300.02     Other insomnia     ICD-10-CM: G47.09  ICD-9-CM: 780.52     Medication management     ICD-10-CM: Z79.899  ICD-9-CM: V58.69     Insomnia due to mental disorder     ICD-10-CM: F51.05  ICD-9-CM: 300.9, 327.02     Bipolar I disorder, most recent episode depressed with melancholic features     ICD-10-CM: F31.30  ICD-9-CM: 296.50           Social History     Tobacco Use   Smoking Status Former    Packs/day: 2.00    Years: 15.00    Pack years: 30.00    Types: Cigarettes    Start date: 1993    Quit date: 3/6/2009    Years since quittin.2   Smokeless Tobacco  Feliciano ELIZABETH reviewed and appropriate. Patient counseled on use of controlled substances.     -The benefits of a healthy diet and exercise were discussed with patient, especially the positive effects they have on mental health. Patient encouraged to consider lifestyle modification regarding  diet and exercise patterns to maximize results of mental health treatment.  -Reviewed previous available documentation  -Reviewed most recent available labs   -Lengthy discussion with patient on the possible side effects of antipsychotic medications including increased cholesterol, increased blood sugar, and possibility of weight gain.  Also discussed the need to monitor lab work associated with this.  The risk of muscle movement disorders with this class of medication was also discussed.    -Prior medications include Zoloft (bad reaction), seroquel (caused him feel like a zombie), haldol (caused vazquez), topamax (worked for a while), lithium (tremors, weight gain, affected blood pressure).     Visit Diagnoses:    ICD-10-CM ICD-9-CM   1. Bipolar II disorder  F31.81 296.89   2. LUIS (generalized anxiety disorder)  F41.1 300.02   3. Other insomnia  G47.09 780.52   4. Medication management  Z79.899 V58.69   5. Insomnia due to mental disorder  F51.05 300.9     327.02   6. Bipolar I disorder, most recent episode depressed with melancholic features  F31.30 296.50         TREATMENT PLAN/GOALS: Continue supportive psychotherapy efforts and medications as indicated. Treatment and medication options discussed during today's visit. Patient acknowledged and verbally consented to continue with current treatment plan and was educated on the importance of compliance with treatment and follow-up appointments.    MEDICATION ISSUES:  Discussed medication options and treatment plan of prescribed medication as well as the risks, benefits, and side effects including potential falls, possible impaired driving and metabolic adversities among others.  Patient is agreeable to call the office with any worsening of symptoms or onset of side effects. Patient is agreeable to call 911 or go to the nearest ER should he/she begin having SI/HI.     MEDS ORDERED DURING VISIT:  New Medications Ordered This Visit   Medications    ziprasidone (Geodon) 80 MG capsule     Sig: Take 1 capsule by mouth 2 (Two) Times a Day With Meals.     Dispense:  60 capsule     Refill:  2    traZODone (DESYREL) 100 MG tablet     Sig: Take 1 & 1/2 tablets by mouth Every Night.     Dispense:  45 tablet     Refill:  2    hydrOXYzine (ATARAX) 10 MG tablet     Sig: Take 1 to 2 tablets by mouth 3 (Three) Times a Day As Needed for Anxiety (insomnia).     Dispense:  60 tablet     Refill:  2    FLUoxetine (PROzac) 10 MG capsule     Sig: Take 1 capsule by mouth Daily.     Dispense:  30 capsule     Refill:  2       Return in about 3 months (around 9/12/2023).     -Continue Geodon to 80 mg twice daily for depression and mood stabilization  -Continue Hydroxyzine HCL 10 mg to 20 mg three times per day as needed for anxiety and insomnia  -Continue Trazodone 150 mg daily at night for insomnia and depression.  -Continue Prozac 10 mg capsule daily, instructed to notify provider if manic symptoms occur.        Prognosis: Guarded dependent on medication/follow up and treatment plan compliance.  Functionality: pt showing improvements in important areas of daily functioning.     Short-term goals: Patient will adhere to medication regimen and note continued improvement in symptoms over the next 3 months.   Long-term goals: Patient will be adherent to medication management and psychotherapy with continued improvement in symptoms over the next 6 months    I spent 30 minutes caring for Thomas on this date of service. This time includes time spent by me in the following activities: preparing for the visit, obtaining and/or reviewing a separately obtained history, performing a medically appropriate examination and/or  evaluation, counseling and educating the patient/family/caregiver, ordering medications, tests, or procedures, and documenting information in the medical record        This document has been electronically signed by LAUREN Sorenson   June 12, 2023 09:46 EDT    Part of this note may be an electronic transcription/translation of spoken language to printed text using the Dragon Dictation System.

## 2023-06-12 ENCOUNTER — OFFICE VISIT (OUTPATIENT)
Dept: PSYCHIATRY | Facility: CLINIC | Age: 48
End: 2023-06-12
Payer: COMMERCIAL

## 2023-06-12 VITALS
SYSTOLIC BLOOD PRESSURE: 132 MMHG | DIASTOLIC BLOOD PRESSURE: 90 MMHG | BODY MASS INDEX: 39.94 KG/M2 | WEIGHT: 279 LBS | HEIGHT: 70 IN | HEART RATE: 80 BPM

## 2023-06-12 DIAGNOSIS — Z79.899 MEDICATION MANAGEMENT: ICD-10-CM

## 2023-06-12 DIAGNOSIS — F31.81 BIPOLAR II DISORDER: Primary | ICD-10-CM

## 2023-06-12 DIAGNOSIS — F31.30: ICD-10-CM

## 2023-06-12 DIAGNOSIS — F51.05 INSOMNIA DUE TO MENTAL DISORDER: ICD-10-CM

## 2023-06-12 DIAGNOSIS — G47.09 OTHER INSOMNIA: ICD-10-CM

## 2023-06-12 DIAGNOSIS — F41.1 GAD (GENERALIZED ANXIETY DISORDER): ICD-10-CM

## 2023-06-12 PROCEDURE — 99214 OFFICE O/P EST MOD 30 MIN: CPT | Performed by: NURSE PRACTITIONER

## 2023-06-12 RX ORDER — FLUOXETINE 10 MG/1
10 CAPSULE ORAL DAILY
Qty: 30 CAPSULE | Refills: 2 | Status: SHIPPED | OUTPATIENT
Start: 2023-06-12 | End: 2024-06-11

## 2023-06-12 RX ORDER — HYDROXYZINE HYDROCHLORIDE 10 MG/1
10-20 TABLET, FILM COATED ORAL 3 TIMES DAILY PRN
Qty: 60 TABLET | Refills: 2 | Status: SHIPPED | OUTPATIENT
Start: 2023-06-12 | End: 2023-09-10

## 2023-06-12 RX ORDER — TRAZODONE HYDROCHLORIDE 100 MG/1
150 TABLET ORAL NIGHTLY
Qty: 45 TABLET | Refills: 2 | Status: SHIPPED | OUTPATIENT
Start: 2023-06-12 | End: 2023-09-10

## 2023-06-12 RX ORDER — ZIPRASIDONE HYDROCHLORIDE 80 MG/1
80 CAPSULE ORAL 2 TIMES DAILY WITH MEALS
Qty: 60 CAPSULE | Refills: 2 | Status: SHIPPED | OUTPATIENT
Start: 2023-06-12 | End: 2023-09-10

## 2023-11-15 ENCOUNTER — OFFICE VISIT (OUTPATIENT)
Dept: FAMILY MEDICINE CLINIC | Facility: CLINIC | Age: 48
End: 2023-11-15
Payer: COMMERCIAL

## 2023-11-15 VITALS
HEIGHT: 70 IN | DIASTOLIC BLOOD PRESSURE: 86 MMHG | TEMPERATURE: 97.1 F | HEART RATE: 100 BPM | BODY MASS INDEX: 40.52 KG/M2 | OXYGEN SATURATION: 99 % | SYSTOLIC BLOOD PRESSURE: 128 MMHG | WEIGHT: 283 LBS

## 2023-11-15 DIAGNOSIS — M47.814 THORACIC ARTHRITIS: ICD-10-CM

## 2023-11-15 DIAGNOSIS — I10 ESSENTIAL HYPERTENSION: ICD-10-CM

## 2023-11-15 DIAGNOSIS — E11.65 TYPE 2 DIABETES MELLITUS WITH HYPERGLYCEMIA, WITH LONG-TERM CURRENT USE OF INSULIN: Primary | ICD-10-CM

## 2023-11-15 DIAGNOSIS — M25.50 ARTHRALGIA, UNSPECIFIED JOINT: ICD-10-CM

## 2023-11-15 DIAGNOSIS — Z12.5 SCREENING PSA (PROSTATE SPECIFIC ANTIGEN): ICD-10-CM

## 2023-11-15 DIAGNOSIS — E66.01 MORBID (SEVERE) OBESITY DUE TO EXCESS CALORIES: ICD-10-CM

## 2023-11-15 DIAGNOSIS — E78.5 HYPERLIPIDEMIA, UNSPECIFIED HYPERLIPIDEMIA TYPE: ICD-10-CM

## 2023-11-15 DIAGNOSIS — R51.9 CHRONIC DAILY HEADACHE: ICD-10-CM

## 2023-11-15 DIAGNOSIS — Z79.4 TYPE 2 DIABETES MELLITUS WITH HYPERGLYCEMIA, WITH LONG-TERM CURRENT USE OF INSULIN: Primary | ICD-10-CM

## 2023-11-15 LAB
EXPIRATION DATE: ABNORMAL
HBA1C MFR BLD: 7.8 % (ref 4.5–5.7)
Lab: ABNORMAL

## 2023-11-15 RX ORDER — TRIAMCINOLONE ACETONIDE 1 MG/G
1 OINTMENT TOPICAL 2 TIMES DAILY
Qty: 30 G | Refills: 1 | Status: SHIPPED | OUTPATIENT
Start: 2023-11-15

## 2023-11-21 RX ORDER — LISINOPRIL 10 MG/1
10 TABLET ORAL DAILY
Qty: 90 TABLET | Refills: 1 | Status: SHIPPED | OUTPATIENT
Start: 2023-11-21

## 2023-11-21 NOTE — PROGRESS NOTES
"Chief Complaint  Diabetes    Subjective        Thomas Mireles presents to [facility]  Diabetes  He presents for his follow-up diabetic visit. He has type 2 diabetes mellitus. His disease course has been stable. There are no hypoglycemic associated symptoms. There are no diabetic associated symptoms. Pertinent negatives for diabetes include no chest pain. There are no hypoglycemic complications. Symptoms are stable. There are no diabetic complications.   Hypertension  This is a chronic (has not taken medication consistent over the past year) problem. The problem is uncontrolled. Pertinent negatives include no chest pain.   Hyperlipidemia  This is a chronic problem. Recent lipid tests were reviewed and are variable. Exacerbating diseases include diabetes and obesity. Pertinent negatives include no chest pain. Compliance problems include adherence to diet, adherence to exercise and psychosocial issues.    Depression  Visit Type: follow-up (stable at present no current medications  Working full time)    Arthritis  Presents for follow-up (Known cervical disc disease with daily headache following a work injury) visit.       Objective   Vital Signs:  /86 (BP Location: Right arm, Patient Position: Sitting)   Pulse 100   Temp 97.1 °F (36.2 °C) (Temporal)   Ht 177.8 cm (70\")   Wt 128 kg (283 lb)   SpO2 99%   BMI 40.61 kg/m²   Estimated body mass index is 40.61 kg/m² as calculated from the following:    Height as of this encounter: 177.8 cm (70\").    Weight as of this encounter: 128 kg (283 lb).               Physical Exam  Vitals and nursing note reviewed.   Constitutional:       General: He is not in acute distress.     Appearance: He is well-developed. He is obese. He is not ill-appearing.   HENT:      Head: Normocephalic.   Cardiovascular:      Rate and Rhythm: Normal rate and regular rhythm.      Heart sounds: Normal heart sounds. No murmur heard.  Pulmonary:      Effort: " Pulmonary effort is normal.      Breath sounds: Normal breath sounds.   Musculoskeletal:      Right lower leg: No edema.      Left lower leg: No edema.   Skin:     General: Skin is warm and dry.   Neurological:      Mental Status: He is alert and oriented to person, place, and time.   Psychiatric:         Behavior: Behavior normal.        Result Review :           Assessment and Plan   Diagnoses and all orders for this visit:    1. Type 2 diabetes mellitus with hyperglycemia, with long-term current use of insulin (Primary)  -     POC Glycosylated Hemoglobin (Hb A1C)  -     Tirzepatide 2.5 MG/0.5ML solution pen-injector; Inject 0.5 mL under the skin into the appropriate area as directed 1 (One) Time Per Week.  Dispense: 2 mL; Refill: 2  -     CBC Auto Differential; Future  -     Comprehensive Metabolic Panel; Future  -     Hemoglobin A1c; Future  -     Lipid Panel; Future  -     TSH; Future  -     Vitamin B12; Future  -     Vitamin D,25-Hydroxy; Future  -     PSA Screen; Future  -     lisinopril (PRINIVIL,ZESTRIL) 10 MG tablet; Take 1 tablet by mouth Daily.  Dispense: 90 tablet; Refill: 1    2. Arthralgia, unspecified joint    3. Chronic daily headache    4. Thoracic arthritis    5. Hyperlipidemia, unspecified hyperlipidemia type  -     Comprehensive Metabolic Panel; Future  -     Lipid Panel; Future    6. Essential hypertension  -     Comprehensive Metabolic Panel; Future  -     Lipid Panel; Future    7. Morbid (severe) obesity due to excess calories    8. Screening PSA (prostate specific antigen)  -     PSA Screen; Future    Other orders  -     triamcinolone (KENALOG) 0.1 % ointment; Apply topically to the appropriate area as directed 2 (Two) Times a Day.  Dispense: 30 g; Refill: 1  -     Pneumococcal Conjugate Vaccine 20-Valent (PCV20)             Follow Up   Return in about 8 weeks (around 1/10/2024), or if symptoms worsen or fail to improve, for Recheck.  Patient was given instructions and counseling regarding  his condition or for health maintenance advice. Please see specific information pulled into the AVS if appropriate.

## 2023-11-28 ENCOUNTER — TELEPHONE (OUTPATIENT)
Dept: FAMILY MEDICINE CLINIC | Facility: CLINIC | Age: 48
End: 2023-11-28
Payer: COMMERCIAL

## 2023-11-29 ENCOUNTER — TELEPHONE (OUTPATIENT)
Dept: FAMILY MEDICINE CLINIC | Facility: CLINIC | Age: 48
End: 2023-11-29
Payer: COMMERCIAL

## 2023-11-29 NOTE — TELEPHONE ENCOUNTER
11/29/2023 PA for Mounjaro was submitted and approved e from 11/29/2023 to 11/28/2024. Patient notified.

## 2023-12-27 DIAGNOSIS — Z79.4 TYPE 2 DIABETES MELLITUS WITH HYPERGLYCEMIA, WITH LONG-TERM CURRENT USE OF INSULIN: ICD-10-CM

## 2023-12-27 DIAGNOSIS — E11.65 TYPE 2 DIABETES MELLITUS WITH HYPERGLYCEMIA, WITH LONG-TERM CURRENT USE OF INSULIN: ICD-10-CM

## 2023-12-27 RX ORDER — DULAGLUTIDE 1.5 MG/.5ML
1.5 INJECTION, SOLUTION SUBCUTANEOUS WEEKLY
Qty: 9 ML | Refills: 1 | OUTPATIENT
Start: 2023-12-27

## 2024-01-17 ENCOUNTER — TELEPHONE (OUTPATIENT)
Dept: FAMILY MEDICINE CLINIC | Facility: CLINIC | Age: 49
End: 2024-01-17
Payer: COMMERCIAL

## 2024-08-13 NOTE — H&P
[facility]    Patient Identification:  Name:  Thomas Mireles  Age:  46 y.o.  Sex:  male  :  1975  MRN:  [id number]   Visit Number:  [id number]  Primary Care Physician:  Kaycee Aleman APRN       Chief complaint:     History of presenting illness:  46 y.o. male presents today for screening colonoscopy.  He denies bright red blood per rectum or melena.  There is no family history of colon cancer.  He does struggle with constipation which has been longstanding.  He has tried both Linzess and Trulance.  Both medications cause diarrhea.  He has not had unusual weight loss.  ---------------------------------------------------------------------------------------------------------------------   Review of Systems   Constitutional: Negative for activity change, appetite change, chills, fatigue, fever and unexpected weight change.   HENT: Negative for mouth sores and trouble swallowing.    Eyes: Negative for photophobia, pain and redness.   Respiratory: Negative for cough and choking.    Cardiovascular: Negative for chest pain and leg swelling.   Gastrointestinal: Positive for constipation. Negative for abdominal distention, abdominal pain, anal bleeding, diarrhea, nausea, rectal pain and vomiting.   Endocrine: Negative for cold intolerance, heat intolerance and polyphagia.   Genitourinary: Negative for difficulty urinating and dysuria.   Musculoskeletal: Positive for back pain. Negative for arthralgias, joint swelling and myalgias.   Skin: Negative for rash and wound.   Allergic/Immunologic: Negative for environmental allergies and food allergies.   Neurological: Negative for dizziness and light-headedness.   Hematological: Negative for adenopathy. Does not bruise/bleed easily.   Psychiatric/Behavioral: Negative for sleep disturbance. The patient is not nervous/anxious.      Patient notified of CT results and voiced understanding.      ---------------------------------------------------------------------------------------------------------------------   Past Medical History:   Diagnosis Date   • Anxiety    • Bipolar 1 disorder (HCC)    • Depression    • Head injury    • HL (hearing loss)    • Hyperlipidemia    • Hypertension    • Kidney stone 17   • Migraine    • Obesity    • Psychiatric illness    • Type 2 diabetes mellitus (HCC)      Past Surgical History:   Procedure Laterality Date   • COLONOSCOPY N/A 2021    Procedure: COLONOSCOPY;  Surgeon: Judy Grant MD;  [facility]: [facility];  Service: Gastroenterology;  Laterality: N/A;   • KNEE SURGERY      right   • NASAL POLYP EXCISION     • URETHRAL DILATATION      infant     Family History   Problem Relation Age of Onset   • Hypertension Mother    • Hypertension Father    • Diabetes Maternal Grandfather    • Heart disease Maternal Grandfather    • Mental illness Maternal Grandfather         PTSD   • Liver cancer Paternal Grandfather    • Asthma Paternal Grandfather          in .   • Diabetes Paternal Grandfather      Social History     Socioeconomic History   • Marital status: Single   Tobacco Use   • Smoking status: Former Smoker     Packs/day: 2.00     Years: 15.00     Pack years: 30.00     Start date: 1993     Quit date: 3/6/2009     Years since quittin.5   • Smokeless tobacco: Never Used   Vaping Use   • Vaping Use: Never used   Substance and Sexual Activity   • Alcohol use: Yes     Alcohol/week: 1.0 standard drink     Types: 1 Cans of beer per week     Comment: Very seldom.   • Drug use: No   • Sexual activity: Not Currently     Partners: Female     Birth control/protection: Condom     ---------------------------------------------------------------------------------------------------------------------   Allergies:  Statins, Zofran [ondansetron], and Zoloft [sertraline  hcl]  ---------------------------------------------------------------------------------------------------------------------   Prior to Admission Medications     Prescriptions Last Dose Informant Patient Reported? Taking?    Accu-Chek Guide test strip   No No    USE AS DIRECTED EVERY DAY    Accu-Chek Softclix Lancets lancets   No No    USE TO CHECK BLOOD GLUCOSE EVERY DAY    Dulaglutide (Trulicity) 1.5 MG/0.5ML solution pen-injector 9/12/2022  No No    Inject 1.5 mg under the skin into the appropriate area as directed 1 (One) Time Per Week.    fenofibrate (TRICOR) 145 MG tablet 9/13/2022  No Yes    Take 1 tablet by mouth Daily.    glucose blood test strip   No No    For daily testing  E11.65    glucose monitor monitoring kit   No No    1 each As Needed (use to check sugar once daily).    lisinopril (PRINIVIL,ZESTRIL) 10 MG tablet 9/13/2022  No Yes    Take 1 tablet by mouth Daily.    metoprolol tartrate (LOPRESSOR) 25 MG tablet 9/13/2022  No Yes    Take 1 tablet by mouth Every 12 (Twelve) Hours.    metroNIDAZOLE (METROGEL) 0.75 % gel 9/13/2022  No Yes    Apply  topically to the appropriate area as directed 2 (Two) Times a Day.    naproxen (Naprosyn) 500 MG tablet Unknown  No No    Take 1 tablet by mouth 2 (Two) Times a Day With Meals.    Omega-3 Fatty Acids (fish oil) 1000 MG capsule capsule Past Week  No Yes    Take 1 capsule by mouth 2 (Two) Times a Day With Meals.    pantoprazole (PROTONIX) 40 MG EC tablet Past Week  Yes Yes    triamcinolone (KENALOG) 0.1 % cream 9/13/2022  No Yes    Apply 1 application topically to the appropriate area as directed 2 (Two) Times a Day.    aspirin (aspirin) 81 MG EC tablet 9/10/2022  No No    Take 1 tablet by mouth Daily.    cyclobenzaprine (FLEXERIL) 5 MG tablet Unknown  No No    Take 1 tablet by mouth At Night As Needed for Muscle Spasms.    hydrOXYzine (ATARAX) 10 MG tablet 9/13/2022  No Yes    Take 1 to 2 tablets by mouth 3 (Three) Times a Day As Needed for Anxiety (insomnia).     lubiprostone (AMITIZA) 24 MCG capsule 9/13/2022  No Yes    Take 1 capsule by mouth 2 (Two) Times a Day With Meals.    sodium-potassium-magnesium sulfates (SUPREP) 17.5-3.13-1.6 GM/177ML solution oral solution 9/13/2022  No Yes    Take 1 bottle by mouth Take As Directed for 2 doses. Take one bottle with 16 oz of water. Then within the hour drink an additional 32oz of water.    traZODone (DESYREL) 100 MG tablet 9/13/2022  No Yes    Take 1 & 1/2 tablets by mouth Every Night.    ziprasidone (Geodon) 80 MG capsule 9/13/2022  No Yes    Take 1 capsule by mouth 2 (Two) Times a Day With Meals.        Hospital Scheduled Meds:  lactated ringers, 125 mL/hr, Intravenous, Once  sodium chloride, 10 mL, Intravenous, Q12H         ---------------------------------------------------------------------------------------------------------------------   Vital Signs:  Temp:  [98.4 °F (36.9 °C)] 98.4 °F (36.9 °C)  Heart Rate:  [78] 78  Resp:  [20] 20  BP: (132)/(84) 132/84      09/14/22  0713   Weight: 118 kg (260 lb)     Body mass index is 37.31 kg/m².  ---------------------------------------------------------------------------------------------------------------------   Physical Exam:  Constitutional:  Well-developed and well-nourished.  No respiratory distress.  Obese      HENT:  Head: Normocephalic and atraumatic.  Mouth:  Moist mucous membranes.    Eyes:  Conjunctivae and EOM are normal.  Pupils are equal, round, and reactive to light.  No scleral icterus.  Neck:  Neck supple.  No JVD present.    Cardiovascular:  Normal rate, regular rhythm and normal heart sounds with no murmur.  Pulmonary/Chest:  No respiratory distress, no wheezes, no crackles, with normal breath sounds and good air movement.  Abdominal:  Soft.  Bowel sounds are normal.  No distension and no tenderness.   Musculoskeletal:  No edema, no tenderness, and no deformity.  No red or swollen joints anywhere.    Neurological:  Alert and oriented to person, place, and  time.  No cranial nerve deficit.  No tongue deviation.  No facial droop.  No slurred speech.   Skin:  Skin is warm and dry.  No rash noted.  No pallor.   Psychiatric:  Normal mood and affect.  Behavior is normal.  Judgment and thought content normal.   Peripheral vascular:  No edema and strong pulses on all 4 extremities.  Genitourinary:  ---------------------------------------------------------------------------------------------------------------------        Invalid input(s): PROTCrCl cannot be calculated (Patient's most recent lab result is older than the maximum 30 days allowed.).  No results found for: AMMONIA          Lab Results   Component Value Date    HGBA1C 5.30 12/13/2021     Lab Results   Component Value Date    TSH 1.430 12/13/2021    FREET4 1.43 12/13/2021     No results found for: PREGTESTUR, PREGSERUM, HCG, HCGQUANT  Pain Management Panel     Pain Management Panel Latest Ref Rng & Units 8/27/2021 1/25/2018    AMPHETAMINES SCREEN, URINE Negative Negative Negative    BARBITURATES SCREEN Negative Negative Negative    BENZODIAZEPINE SCREEN, URINE Negative Negative Negative    BUPRENORPHINEUR Negative Negative Negative    COCAINE SCREEN, URINE Negative Negative Negative    METHADONE SCREEN, URINE Negative Negative Negative        No results found for: BLOODCX  No results found for: URINECX  No results found for: WOUNDCX  No results found for: STOOLCX      ---------------------------------------------------------------------------------------------------------------------  Imaging Results (Last 7 Days)     ** No results found for the last 168 hours. **          I have personally reviewed the radiology images and read the final radiology report.  ---------------------------------------------------------------------------------------------------------------------  Assessment and Plan:  Screening colonoscopy    Judy Ramirez-MD [place]  09/14/22  07:31 EDT

## 2024-11-18 ENCOUNTER — OFFICE VISIT (OUTPATIENT)
Dept: FAMILY MEDICINE CLINIC | Facility: CLINIC | Age: 49
End: 2024-11-18
Payer: COMMERCIAL

## 2024-11-18 VITALS
SYSTOLIC BLOOD PRESSURE: 134 MMHG | BODY MASS INDEX: 38.97 KG/M2 | DIASTOLIC BLOOD PRESSURE: 84 MMHG | HEART RATE: 84 BPM | WEIGHT: 272.2 LBS | OXYGEN SATURATION: 96 % | HEIGHT: 70 IN | TEMPERATURE: 97.1 F

## 2024-11-18 DIAGNOSIS — M47.814 THORACIC ARTHRITIS: ICD-10-CM

## 2024-11-18 DIAGNOSIS — E66.01 MORBID (SEVERE) OBESITY DUE TO EXCESS CALORIES: ICD-10-CM

## 2024-11-18 DIAGNOSIS — L71.9 ROSACEA: ICD-10-CM

## 2024-11-18 DIAGNOSIS — I10 ESSENTIAL HYPERTENSION: ICD-10-CM

## 2024-11-18 DIAGNOSIS — Z12.5 SCREENING PSA (PROSTATE SPECIFIC ANTIGEN): ICD-10-CM

## 2024-11-18 DIAGNOSIS — Z79.4 TYPE 2 DIABETES MELLITUS WITH HYPERGLYCEMIA, WITH LONG-TERM CURRENT USE OF INSULIN: Primary | ICD-10-CM

## 2024-11-18 DIAGNOSIS — M25.50 ARTHRALGIA, UNSPECIFIED JOINT: ICD-10-CM

## 2024-11-18 DIAGNOSIS — E78.5 HYPERLIPIDEMIA, UNSPECIFIED HYPERLIPIDEMIA TYPE: ICD-10-CM

## 2024-11-18 DIAGNOSIS — E11.65 TYPE 2 DIABETES MELLITUS WITH HYPERGLYCEMIA, WITH LONG-TERM CURRENT USE OF INSULIN: Primary | ICD-10-CM

## 2024-11-18 DIAGNOSIS — R51.9 CHRONIC DAILY HEADACHE: ICD-10-CM

## 2024-11-18 LAB
EXPIRATION DATE: ABNORMAL
HBA1C MFR BLD: 10.1 % (ref 4.5–5.7)
Lab: ABNORMAL

## 2024-11-18 PROCEDURE — 83036 HEMOGLOBIN GLYCOSYLATED A1C: CPT | Performed by: NURSE PRACTITIONER

## 2024-11-18 PROCEDURE — 99214 OFFICE O/P EST MOD 30 MIN: CPT | Performed by: NURSE PRACTITIONER

## 2024-11-18 RX ORDER — CYCLOBENZAPRINE HCL 5 MG
5 TABLET ORAL NIGHTLY PRN
Qty: 90 TABLET | Refills: 1 | Status: SHIPPED | OUTPATIENT
Start: 2024-11-18

## 2024-11-18 RX ORDER — PANTOPRAZOLE SODIUM 40 MG/1
40 TABLET, DELAYED RELEASE ORAL DAILY
Qty: 90 TABLET | Refills: 1 | Status: SHIPPED | OUTPATIENT
Start: 2024-11-18

## 2024-11-18 RX ORDER — LISINOPRIL 5 MG/1
5 TABLET ORAL DAILY
Qty: 90 TABLET | Refills: 1 | Status: SHIPPED | OUTPATIENT
Start: 2024-11-18

## 2024-11-18 RX ORDER — TRIAMCINOLONE ACETONIDE 1 MG/G
1 OINTMENT TOPICAL 2 TIMES DAILY
Qty: 30 G | Refills: 1 | Status: SHIPPED | OUTPATIENT
Start: 2024-11-18

## 2024-11-18 RX ORDER — METRONIDAZOLE 7.5 MG/G
1 GEL TOPICAL 2 TIMES DAILY
Qty: 45 G | Refills: 5 | Status: SHIPPED | OUTPATIENT
Start: 2024-11-18

## 2024-11-18 NOTE — PROGRESS NOTES
"Chief Complaint  Diabetes    Subjective        Thomas Mireles presents to [facility]  History of Present Illness  Returns today follow up after a long absence.  Not taking any medication     Diabetes  He presents for his follow-up diabetic visit. He has type 2 diabetes mellitus. His disease course has been fluctuating. There are no hypoglycemic associated symptoms. There are no diabetic associated symptoms. Pertinent negatives for diabetes include no chest pain. There are no hypoglycemic complications. Symptoms are stable. There are no diabetic complications.   Hypertension  This is a chronic problem. The problem is uncontrolled. Pertinent negatives include no chest pain.   Hyperlipidemia  This is a chronic problem. Recent lipid tests were reviewed and are variable. Exacerbating diseases include diabetes and obesity. Pertinent negatives include no chest pain. Compliance problems include adherence to diet, adherence to exercise and psychosocial issues.    Depression  Visit Type: follow-up (stable at present no current medications  Working full time)    Arthritis  Presents for follow-up (Known cervical disc disease with daily headache following a work injury) visit. He complains of pain and stiffness. The symptoms have been stable. Affected locations include the neck.       Objective   Vital Signs:  /84 (BP Location: Right arm, Patient Position: Sitting)   Pulse 84   Temp 97.1 °F (36.2 °C)   Ht 177.8 cm (70\")   Wt 123 kg (272 lb 3.2 oz)   SpO2 96%   BMI 39.06 kg/m²   Estimated body mass index is 39.06 kg/m² as calculated from the following:    Height as of this encounter: 177.8 cm (70\").    Weight as of this encounter: 123 kg (272 lb 3.2 oz).             Physical Exam  Vitals and nursing note reviewed.   Constitutional:       General: He is not in acute distress.     Appearance: He is well-developed. He is obese. He is not ill-appearing.   HENT:      Head: Normocephalic. "   Cardiovascular:      Rate and Rhythm: Normal rate and regular rhythm.      Heart sounds: Normal heart sounds. No murmur heard.  Pulmonary:      Effort: Pulmonary effort is normal.      Breath sounds: Normal breath sounds.   Musculoskeletal:      Right lower leg: No edema.      Left lower leg: No edema.   Skin:     General: Skin is warm and dry.   Neurological:      Mental Status: He is alert and oriented to person, place, and time.   Psychiatric:         Behavior: Behavior normal.        Result Review :           Assessment and Plan   Diagnoses and all orders for this visit:    1. Type 2 diabetes mellitus with hyperglycemia, with long-term current use of insulin (Primary)  -     lisinopril (PRINIVIL,ZESTRIL) 5 MG tablet; Take 1 tablet by mouth Daily.  Dispense: 90 tablet; Refill: 1  -     Dulaglutide 0.75 MG/0.5ML solution auto-injector; Inject 0.75 mg under the skin into the appropriate area as directed 1 (One) Time Per Week.  Dispense: 6 mL; Refill: 1  -     CBC Auto Differential; Future  -     Comprehensive Metabolic Panel; Future  -     Hemoglobin A1c; Future  -     Lipid Panel; Future  -     TSH; Future  -     Vitamin B12; Future  -     Vitamin D,25-Hydroxy; Future  -     MicroAlbumin, Urine, Random - Urine, Clean Catch; Future  -     POC Glycosylated Hemoglobin (Hb A1C)    2. Arthralgia, unspecified joint  -     cyclobenzaprine (FLEXERIL) 5 MG tablet; Take 1 tablet by mouth At Night As Needed for Muscle Spasms.  Dispense: 90 tablet; Refill: 1    3. Chronic daily headache  -     cyclobenzaprine (FLEXERIL) 5 MG tablet; Take 1 tablet by mouth At Night As Needed for Muscle Spasms.  Dispense: 90 tablet; Refill: 1    4. Rosacea  -     metroNIDAZOLE (METROGEL) 0.75 % gel; Apply  topically to the appropriate area as directed 2 (Two) Times a Day.  Dispense: 45 g; Refill: 5    5. Essential hypertension  -     Comprehensive Metabolic Panel; Future  -     Lipid Panel; Future  -     TSH; Future    6. Hyperlipidemia,  unspecified hyperlipidemia type  -     Comprehensive Metabolic Panel; Future  -     Lipid Panel; Future    7. Morbid (severe) obesity due to excess calories    8. Screening PSA (prostate specific antigen)  -     PSA Screen; Future    9. Thoracic arthritis    Other orders  -     pantoprazole (PROTONIX) 40 MG EC tablet; Take 1 tablet by mouth Daily.  Dispense: 90 tablet; Refill: 1  -     triamcinolone (KENALOG) 0.1 % ointment; Apply topically to the appropriate area as directed 2 (Two) Times a Day.  Dispense: 30 g; Refill: 1             Follow Up   Return in about 3 months (around 2/18/2025), or if symptoms worsen or fail to improve, for Recheck.  Patient was given instructions and counseling regarding his condition or for health maintenance advice. Please see specific information pulled into the AVS if appropriate.

## 2024-12-02 ENCOUNTER — TELEPHONE (OUTPATIENT)
Dept: FAMILY MEDICINE CLINIC | Facility: CLINIC | Age: 49
End: 2024-12-02
Payer: COMMERCIAL

## 2024-12-02 NOTE — TELEPHONE ENCOUNTER
12/02/2024 PA for Trulicity 0.75MG/0.5M submitted, waiting on reply.    Reply from patient's insurance: The requested medication does not require a prior authorization.

## (undated) DEVICE — SINGLE PORT MANIFOLD: Brand: NEPTUNE 2

## (undated) DEVICE — Device

## (undated) DEVICE — 4-PORT MANIFOLD: Brand: NEPTUNE 2

## (undated) DEVICE — SYR LUERLOK 30CC

## (undated) DEVICE — POLYP TRAP: Brand: TRAPEASE®

## (undated) DEVICE — ENDOGATOR TUBING FOR ENDOGATOR EGP-100 IRRIGATION PUMP,OLYMPUS OFP PUMP, OLYMPUS AFU-100 PUMP AND ERBE EIP2 PUMP: Brand: ENDOGATOR

## (undated) DEVICE — CONN Y IRR DISP 1P/U

## (undated) DEVICE — GOWN,REINF,POLY,ECL,PP SLV,XL: Brand: MEDLINE

## (undated) DEVICE — ENDOGATOR AUXILIARY WATER JET CONNECTOR: Brand: ENDOGATOR

## (undated) DEVICE — Device: Brand: DEFENDO AIR/WATER/SUCTION AND BIOPSY VALVE

## (undated) DEVICE — Device: Brand: SPOT EX ENDOSCOPIC TATTOO

## (undated) DEVICE — SUCTION CANISTER, 1500CC, RIGID: Brand: DEROYAL

## (undated) DEVICE — SNAR POLYP CAPTIFLX MICRO OVL 13MM 240CM

## (undated) DEVICE — TUBING, SUCTION, 1/4" X 20', STRAIGHT: Brand: MEDLINE INDUSTRIES, INC.

## (undated) DEVICE — NDL SCLEROTHRPY INTERJECT 25G 4 240 CLR